# Patient Record
Sex: MALE | Race: WHITE | NOT HISPANIC OR LATINO | Employment: FULL TIME | URBAN - METROPOLITAN AREA
[De-identification: names, ages, dates, MRNs, and addresses within clinical notes are randomized per-mention and may not be internally consistent; named-entity substitution may affect disease eponyms.]

---

## 2017-09-07 DIAGNOSIS — M79.2 NEUROPATHIC PAIN SYNDROME (NON-HERPETIC): ICD-10-CM

## 2017-09-07 RX ORDER — PREGABALIN 150 MG/1
300 CAPSULE ORAL 3 TIMES DAILY
Qty: 540 CAPSULE | Refills: 0 | OUTPATIENT
Start: 2017-09-07

## 2017-09-07 NOTE — TELEPHONE ENCOUNTER
Routing refill request to provider for review/approval because:  Patient needs to be seen because it has been more than 1 year since last office visit.  Miracle Ferro RN CPC Triage.

## 2017-09-07 NOTE — TELEPHONE ENCOUNTER
pregabalin (LYRICA) 150 MG capsule      Last Written Prescription Date:  8-16-16  Last Fill Quantity: 540,   # refills: 3  Last Office Visit with [facility], [facility] or [facility] prescribing provider: 8-16-16  Future Office visit:       Routing refill request to provider for review/approval because:  Drug not on the [facility], [facility] or [facility] refill protocol or controlled substance

## 2017-09-12 DIAGNOSIS — I10 ESSENTIAL HYPERTENSION WITH GOAL BLOOD PRESSURE LESS THAN 130/80: ICD-10-CM

## 2017-09-12 NOTE — TELEPHONE ENCOUNTER
valsartan (DIOVAN) 160 MG tablet      Last Written Prescription Date: 8/16/16  Last Fill Quantity: 90, # refills: 3  Last Office Visit with [facility] or [facility] prescribing provider: 8/16/16       Potassium   Date Value Ref Range Status   08/16/2016 4.3 3.4 - 5.3 mmol/L Final     Creatinine   Date Value Ref Range Status   08/16/2016 0.78 0.66 - 1.25 mg/dL Final     BP Readings from Last 3 Encounters:   08/16/16 125/74   08/03/15 125/77   03/10/15 124/76

## 2017-09-13 RX ORDER — VALSARTAN 160 MG/1
160 TABLET ORAL DAILY
Qty: 30 TABLET | Refills: 0 | Status: SHIPPED | OUTPATIENT
Start: 2017-09-13 | End: 2017-09-25

## 2017-09-13 NOTE — TELEPHONE ENCOUNTER
Routing refill request to provider for review/approval because:  Labs out of range  Patient needs to be seen because it has been more than 1 year since last office visit.  Script still under Dr. Johnson.  Miracle Ferro RN CPC Triage.

## 2017-09-25 ENCOUNTER — OFFICE VISIT (OUTPATIENT)
Dept: FAMILY MEDICINE | Facility: CLINIC | Age: 57
End: 2017-09-25
Payer: COMMERCIAL

## 2017-09-25 VITALS
HEART RATE: 88 BPM | TEMPERATURE: 97.3 F | HEIGHT: 69 IN | DIASTOLIC BLOOD PRESSURE: 75 MMHG | OXYGEN SATURATION: 99 % | BODY MASS INDEX: 28.9 KG/M2 | WEIGHT: 195.13 LBS | SYSTOLIC BLOOD PRESSURE: 127 MMHG

## 2017-09-25 DIAGNOSIS — M79.2 NEUROPATHIC PAIN SYNDROME (NON-HERPETIC): ICD-10-CM

## 2017-09-25 DIAGNOSIS — E78.5 HYPERLIPIDEMIA LDL GOAL <130: ICD-10-CM

## 2017-09-25 DIAGNOSIS — I10 HYPERTENSION GOAL BP (BLOOD PRESSURE) < 140/90: ICD-10-CM

## 2017-09-25 DIAGNOSIS — Z00.01 ENCOUNTER FOR PREVENTATIVE ADULT HEALTH CARE EXAM WITH ABNORMAL FINDINGS: Primary | ICD-10-CM

## 2017-09-25 DIAGNOSIS — G89.4 CHRONIC PAIN DISORDER: ICD-10-CM

## 2017-09-25 DIAGNOSIS — R73.01 IMPAIRED FASTING GLUCOSE: ICD-10-CM

## 2017-09-25 DIAGNOSIS — Z12.5 SCREENING FOR PROSTATE CANCER: ICD-10-CM

## 2017-09-25 LAB
ALBUMIN SERPL-MCNC: 4 G/DL (ref 3.4–5)
ALP SERPL-CCNC: 83 U/L (ref 40–150)
ALT SERPL W P-5'-P-CCNC: 38 U/L (ref 0–70)
ANION GAP SERPL CALCULATED.3IONS-SCNC: 8 MMOL/L (ref 3–14)
AST SERPL W P-5'-P-CCNC: 23 U/L (ref 0–45)
BILIRUB SERPL-MCNC: 0.5 MG/DL (ref 0.2–1.3)
BUN SERPL-MCNC: 12 MG/DL (ref 7–30)
CALCIUM SERPL-MCNC: 8.9 MG/DL (ref 8.5–10.1)
CHLORIDE SERPL-SCNC: 106 MMOL/L (ref 94–109)
CHOLEST SERPL-MCNC: 154 MG/DL
CO2 SERPL-SCNC: 25 MMOL/L (ref 20–32)
CREAT SERPL-MCNC: 0.91 MG/DL (ref 0.66–1.25)
GFR SERPL CREATININE-BSD FRML MDRD: 86 ML/MIN/1.7M2
GLUCOSE SERPL-MCNC: 91 MG/DL (ref 70–99)
HBA1C MFR BLD: 5.7 % (ref 4.3–6)
HDLC SERPL-MCNC: 49 MG/DL
LDLC SERPL CALC-MCNC: 79 MG/DL
NONHDLC SERPL-MCNC: 105 MG/DL
POTASSIUM SERPL-SCNC: 4.2 MMOL/L (ref 3.4–5.3)
PROT SERPL-MCNC: 7.3 G/DL (ref 6.8–8.8)
PSA SERPL-ACNC: 1.45 UG/L (ref 0–4)
SODIUM SERPL-SCNC: 139 MMOL/L (ref 133–144)
TRIGL SERPL-MCNC: 128 MG/DL

## 2017-09-25 PROCEDURE — 99396 PREV VISIT EST AGE 40-64: CPT | Performed by: FAMILY MEDICINE

## 2017-09-25 PROCEDURE — G0103 PSA SCREENING: HCPCS | Performed by: FAMILY MEDICINE

## 2017-09-25 PROCEDURE — 83036 HEMOGLOBIN GLYCOSYLATED A1C: CPT | Performed by: FAMILY MEDICINE

## 2017-09-25 PROCEDURE — 80053 COMPREHEN METABOLIC PANEL: CPT | Performed by: FAMILY MEDICINE

## 2017-09-25 PROCEDURE — 36415 COLL VENOUS BLD VENIPUNCTURE: CPT | Performed by: FAMILY MEDICINE

## 2017-09-25 PROCEDURE — 80061 LIPID PANEL: CPT | Performed by: FAMILY MEDICINE

## 2017-09-25 RX ORDER — VALSARTAN 160 MG/1
160 TABLET ORAL DAILY
Qty: 90 TABLET | Refills: 3 | Status: SHIPPED | OUTPATIENT
Start: 2017-09-25 | End: 2018-06-21

## 2017-09-25 RX ORDER — HYDROCODONE BITARTRATE AND ACETAMINOPHEN 5; 325 MG/1; MG/1
1 TABLET ORAL EVERY 6 HOURS PRN
Qty: 100 TABLET | Refills: 0 | Status: SHIPPED | OUTPATIENT
Start: 2017-09-25 | End: 2018-05-18

## 2017-09-25 RX ORDER — SERTRALINE HYDROCHLORIDE 100 MG/1
100 TABLET, FILM COATED ORAL DAILY
Qty: 90 TABLET | Refills: 3 | Status: SHIPPED | OUTPATIENT
Start: 2017-09-25 | End: 2018-06-21

## 2017-09-25 RX ORDER — PREGABALIN 150 MG/1
300 CAPSULE ORAL 3 TIMES DAILY
Qty: 540 CAPSULE | Refills: 3 | Status: SHIPPED | OUTPATIENT
Start: 2017-09-25 | End: 2018-06-21

## 2017-09-25 RX ORDER — SIMVASTATIN 40 MG
40 TABLET ORAL AT BEDTIME
Qty: 90 TABLET | Refills: 3 | Status: SHIPPED | OUTPATIENT
Start: 2017-09-25 | End: 2018-06-21

## 2017-09-25 ASSESSMENT — ANXIETY QUESTIONNAIRES
IF YOU CHECKED OFF ANY PROBLEMS ON THIS QUESTIONNAIRE, HOW DIFFICULT HAVE THESE PROBLEMS MADE IT FOR YOU TO DO YOUR WORK, TAKE CARE OF THINGS AT HOME, OR GET ALONG WITH OTHER PEOPLE: NOT DIFFICULT AT ALL
2. NOT BEING ABLE TO STOP OR CONTROL WORRYING: NOT AT ALL
6. BECOMING EASILY ANNOYED OR IRRITABLE: NOT AT ALL
1. FEELING NERVOUS, ANXIOUS, OR ON EDGE: SEVERAL DAYS
3. WORRYING TOO MUCH ABOUT DIFFERENT THINGS: NOT AT ALL
GAD7 TOTAL SCORE: 1
7. FEELING AFRAID AS IF SOMETHING AWFUL MIGHT HAPPEN: NOT AT ALL
5. BEING SO RESTLESS THAT IT IS HARD TO SIT STILL: NOT AT ALL

## 2017-09-25 ASSESSMENT — PATIENT HEALTH QUESTIONNAIRE - PHQ9
5. POOR APPETITE OR OVEREATING: NOT AT ALL
SUM OF ALL RESPONSES TO PHQ QUESTIONS 1-9: 2

## 2017-09-25 ASSESSMENT — PAIN SCALES - GENERAL: PAINLEVEL: NO PAIN (0)

## 2017-09-25 NOTE — PATIENT INSTRUCTIONS
Stretching/ strengthening for neck and back    Continue same meds    We will send you lab results

## 2017-09-25 NOTE — PROGRESS NOTES
SUBJECTIVE:   CC: Alexey Reed is an 56 year old male who presents for preventative health visit.     Physical   Annual:     Getting at least 3 servings of Calcium per day::  Yes    Bi-annual eye exam::  Yes    Dental care twice a year::  Yes    Sleep apnea or symptoms of sleep apnea::  None    Diet::  Regular (no restrictions)    Frequency of exercise::  4-5 days/week    Duration of exercise::  15-30 minutes    Taking medications regularly::  Yes    Medication side effects::  Not applicable    Additional concerns today::  No            None    Pronounced cokeon    Comes in once year    Used to see Elizabeth    1998 bad accident  Jaw, neck, etc    lyrica works well    Saw specialists, had a surgery    Dentist helped some    Night appliance for a while    Flares up about monthly    Bad for 1-2 days    Left side of face    Painful     Some numbness    Wakeboard accident    Stretching exercises    Still smokes, interested in qutting    Lots of walking at work, 6- 10K steps per day        Today's PHQ-2 Score:   PHQ-2 ( 1999 Pfizer) 9/25/2017   Q1: Little interest or pleasure in doing things 0   Q2: Feeling down, depressed or hopeless 0   PHQ-2 Score 0   Q1: Little interest or pleasure in doing things Not at all   Q2: Feeling down, depressed or hopeless Not at all   PHQ-2 Score 0       Abuse: Current or Past(Physical, Sexual or Emotional)- No  Do you feel safe in your environment - Yes    Social History   Substance Use Topics     Smoking status: Current Every Day Smoker     Packs/day: 0.50     Years: 21.00     Types: Cigarettes     Smokeless tobacco: Never Used     Alcohol use No     The patient does not drink >3 drinks per day nor >7 drinks per week.    Last PSA:   PSA   Date Value Ref Range Status   08/16/2016 1.49 0 - 4 ug/L Final     Comment:     Assay Method:  Chemiluminescence using Siemens Vista analyzer       Reviewed orders with patient. Reviewed health maintenance and updated orders accordingly - Yes    "    Reviewed and updated as needed this visit by clinical staff  Tobacco  Allergies  Meds  Problems  Med Hx  Surg Hx  Fam Hx  Soc Hx          Reviewed and updated as needed this visit by Provider              ROS:  C: NEGATIVE for fever, chills, change in weight  I: NEGATIVE for worrisome rashes, moles or lesions  E: NEGATIVE for vision changes or irritation  ENT: NEGATIVE for ear, mouth and throat problems  R: NEGATIVE for significant cough or SOB  CV: NEGATIVE for chest pain, palpitations or peripheral edema  GI: NEGATIVE for nausea, abdominal pain, heartburn, or change in bowel habits   male: negative for dysuria, hematuria, decreased urinary stream, erectile dysfunction, urethral discharge  MUSCULOSKELETAL:see above    N: NEGATIVE for weakness, dizziness or paresthesias  E: NEGATIVE for temperature intolerance, skin/hair changes    OBJECTIVE:   /75 (BP Location: Left arm, Patient Position: Chair, Cuff Size: Adult Regular)  Pulse 88  Temp 97.3  F (36.3  C) (Oral)  Ht 5' 9\" (1.753 m)  Wt 195 lb 2 oz (88.5 kg)  SpO2 99%  BMI 28.81 kg/m2    EXAM:  GENERAL: healthy, alert and no distress  HENT: ear canals and TM's normal, nose and mouth without ulcers or lesions  NECK: no adenopathy, no asymmetry, masses, or scars and thyroid normal to palpation  RESP: lungs clear to auscultation - no rales, rhonchi or wheezes  CV: regular rate and rhythm, normal S1 S2, no S3 or S4, no murmur, click or rub, no peripheral edema and peripheral pulses strong  ABDOMEN: soft, nontender, no hepatosplenomegaly, no masses and bowel sounds normal  MS: no gross musculoskeletal defects noted, no edema  SKIN: no suspicious lesions or rashes  NEURO: Normal strength and tone, mentation intact and speech normal  PSYCH: mentation appears normal, affect normal/bright  Note patient declined the genital / rectal exam;  No problems there he states    ASSESSMENT/PLAN:   Alexey was seen today for physical, health maintenance and *_* " health care directive *_*.    Diagnoses and all orders for this visit:    Encounter for preventative adult health care exam with abnormal findings    Neuropathic pain syndrome (non-herpetic)  -     pregabalin (LYRICA) 150 MG capsule; Take 2 capsules (300 mg) by mouth 3 times daily  -     sertraline (ZOLOFT) 100 MG tablet; Take 1 tablet (100 mg) by mouth daily  -     HYDROcodone-acetaminophen (NORCO) 5-325 MG per tablet; Take 1 tablet by mouth every 6 hours as needed for pain    Chronic pain disorder  -     HYDROcodone-acetaminophen (NORCO) 5-325 MG per tablet; Take 1 tablet by mouth every 6 hours as needed for pain    Hypertension goal BP (blood pressure) < 140/90  -     valsartan (DIOVAN) 160 MG tablet; Take 1 tablet (160 mg) by mouth daily    Hyperlipidemia LDL goal <130  -     simvastatin (ZOCOR) 40 MG tablet; Take 1 tablet (40 mg) by mouth At Bedtime Due for office visit  -     Lipid panel reflex to direct LDL  -     Comprehensive metabolic panel    Impaired fasting glucose  -     Hemoglobin A1c    Screening for prostate cancer  -     Prostate spec antigen screen    Other orders  -     Cancel: Basic metabolic panel  -     Cancel: Drug  Screen Comprehensive, Urine w/o Reported Meds (Pain Care Package)    overall patient stable  Has the chronic nerve pain, mostly controlled on lyrica for years; refill this  Also the vicodin prescription lasted him over a year.  Will refill this with the expectation that this one will also last a full year.  He understands and agrees.  Up to date on colonoscopy  Check labs  Keep working on healthy diet/exercise     COUNSELING:   Reviewed preventive health counseling, as reflected in patient instructions       Regular exercise       Healthy diet/nutrition       Vision screening       Hearing screening       Safe sex practices/STD prevention       Colon cancer screening       Prostate cancer screening           reports that he has been smoking Cigarettes.  He has a 10.50 pack-year  "smoking history. He has never used smokeless tobacco.  Tobacco Cessation Action Plan: Information offered: Patient not interested at this time  Estimated body mass index is 28.81 kg/(m^2) as calculated from the following:    Height as of this encounter: 5' 9\" (1.753 m).    Weight as of this encounter: 195 lb 2 oz (88.5 kg).   Weight management plan: Discussed healthy diet and exercise guidelines and patient will follow up in 12 months in clinic to re-evaluate.    Counseling Resources:  ATP IV Guidelines  Pooled Cohorts Equation Calculator  FRAX Risk Assessment  ICSI Preventive Guidelines  Dietary Guidelines for Americans, 2010  USDA's MyPlate  ASA Prophylaxis  Lung CA Screening    David Morse MD  [facility]  Answers for HPI/ROS submitted by the patient on 9/25/2017   PHQ-2 Score: 0    "

## 2017-09-25 NOTE — MR AVS SNAPSHOT
After Visit Summary   9/25/2017    Alexey Reed    MRN: [id number]           Patient Information     Date Of Birth          1960        Visit Information        Provider Department      9/25/2017 11:40 AM David Morse MD [facility]        Today's Diagnoses     Impaired fasting glucose    -  1    Essential hypertension with goal blood pressure less than 130/80        Hyperlipidemia LDL goal <130        Neuropathic pain syndrome (non-herpetic)        Chronic pain disorder        Screening for prostate cancer          Care Instructions    Stretching/ strengthening for neck and back    Continue same meds    We will send you lab results           Follow-ups after your visit        Who to contact     If you have questions or need follow up information about today's clinic visit or your schedule please contact [facility] directly at [phone].  Normal or non-critical lab and imaging results will be communicated to you by MyChart, letter or phone within 4 business days after the clinic has received the results. If you do not hear from us within 7 days, please contact the clinic through [facility] or phone. If you have a critical or abnormal lab result, we will notify you by phone as soon as possible.  Submit refill requests through [email or web address] or call your pharmacy and they will forward the refill request to us. Please allow 3 business days for your refill to be completed.          Additional Information About Your Visit        My[employer] Information     [email or web address] gives you secure access to your electronic health record. If you see a primary care provider, you can also send messages to your care team and make appointments. If you have questions, please call your primary care clinic.  If you do not have a primary care provider, please call [phone] and they will assist you.        Care EveryWhere ID     This is your Care EveryWhere ID. This could be used by  "other organizations to access your [place] medical records  [id number]        Your Vitals Were     Pulse Temperature Height Pulse Oximetry BMI (Body Mass Index)       88 97.3  F (36.3  C) (Oral) 5' 9\" (1.753 m) 99% 28.81 kg/m2        Blood Pressure from Last 3 Encounters:   09/25/17 127/75   08/16/16 125/74   08/03/15 125/77    Weight from Last 3 Encounters:   09/25/17 195 lb 2 oz (88.5 kg)   08/16/16 201 lb (91.2 kg)   08/03/15 200 lb (90.7 kg)              We Performed the Following     Comprehensive metabolic panel     Hemoglobin A1c     Lipid panel reflex to direct LDL     Prostate spec antigen screen          Where to get your medicines      These medications were sent to [facility] [id number]  [place] - [address] [place] AT [facility] [address]  [address] [place] [address]     Phone:  [phone]     sertraline 100 MG tablet    simvastatin 40 MG tablet    valsartan 160 MG tablet         Some of these will need a paper prescription and others can be bought over the counter.  Ask your nurse if you have questions.     Bring a paper prescription for each of these medications     HYDROcodone-acetaminophen 5-325 MG per tablet    pregabalin 150 MG capsule          Primary Care Provider Office Phone # Fax #    Terry Johnson [phone]       [address] [facility] [address]        Equal Access to Services     NEREIDA [facility] AH: Hadii lida ku albao Sosvitlana, waaxda luqadaha, qaybta kaalmada elizabethyada, wax lobito rasheed. So [facility] [place] [phone].    ATENCIÓN: Si habla español, tiene a carlisle disposición servicios gratuitos de asistencia lingüística. Llame al [phone].    We comply with applicable federal civil rights laws and [place] laws. We do not discriminate on the basis of race, color, national origin, age, disability sex, sexual orientation or gender identity.            Thank you!     Thank you for choosing [facility] " [facility]  for your care. Our goal is always to provide you with excellent care. Hearing back from our patients is one way we can continue to improve our services. Please take a few minutes to complete the written survey that you may receive in the mail after your visit with us. Thank you!             Your Updated Medication List - Protect others around you: Learn how to safely use, store and throw away your medicines at www.disposemymeds.org.          This list is accurate as of: 9/25/17 12:11 PM.  Always use your most recent med list.                   Brand Name Dispense Instructions for use Diagnosis    aspirin 325 MG EC tablet      1 TABLET DAILY        HYDROcodone-acetaminophen 5-325 MG per tablet    NORCO    100 tablet    Take 1 tablet by mouth every 6 hours as needed for pain    Neuropathic pain syndrome (non-herpetic), Chronic pain disorder       Multi-vitamin Tabs tablet   Generic drug:  multivitamin, therapeutic with minerals      1 TABLET DAILY        pregabalin 150 MG capsule    LYRICA    540 capsule    Take 2 capsules (300 mg) by mouth 3 times daily    Neuropathic pain syndrome (non-herpetic)       sertraline 100 MG tablet    ZOLOFT    90 tablet    Take 1 tablet (100 mg) by mouth daily    Neuropathic pain syndrome (non-herpetic)       simvastatin 40 MG tablet    ZOCOR    90 tablet    Take 1 tablet (40 mg) by mouth At Bedtime Due for office visit    Hyperlipidemia LDL goal <130       valsartan 160 MG tablet    DIOVAN    90 tablet    Take 1 tablet (160 mg) by mouth daily    Essential hypertension with goal blood pressure less than 130/80

## 2017-09-25 NOTE — NURSING NOTE
"Chief Complaint   Patient presents with     Physical     Health Maintenance     RONALD and PHQ-9     *_* Health Care Directive *_*       Initial /75 (BP Location: Left arm, Patient Position: Chair, Cuff Size: Adult Regular)  Pulse 88  Temp 97.3  F (36.3  C) (Oral)  Ht 5' 9\" (1.753 m)  Wt 195 lb 2 oz (88.5 kg)  SpO2 99%  BMI 28.81 kg/m2 Estimated body mass index is 28.81 kg/(m^2) as calculated from the following:    Height as of this encounter: 5' 9\" (1.753 m).    Weight as of this encounter: 195 lb 2 oz (88.5 kg).  Medication Reconciliation: complete   Leilani Camargo CMA      "

## 2017-09-26 ENCOUNTER — MYC MEDICAL ADVICE (OUTPATIENT)
Dept: FAMILY MEDICINE | Facility: CLINIC | Age: 57
End: 2017-09-26

## 2017-09-26 ASSESSMENT — ANXIETY QUESTIONNAIRES: GAD7 TOTAL SCORE: 1

## 2017-09-26 NOTE — TELEPHONE ENCOUNTER
I tried to sign electronically but not sure if worked.  Also printed and signed copy, to mail to patient.    David Morse MD

## 2017-09-26 NOTE — TELEPHONE ENCOUNTER
Routing to PCP to review and advise.  See my chart message  Sahara Silverio RN  [facility]

## 2017-09-27 ENCOUNTER — TELEPHONE (OUTPATIENT)
Dept: FAMILY MEDICINE | Facility: CLINIC | Age: 57
End: 2017-09-27

## 2017-09-27 NOTE — TELEPHONE ENCOUNTER
PA is needed for the medication, Lyrica 150 mg. Would you like to start PA or Rx alternative medication? If PA, please list all medications this patient has tried along with any contraindications that may have been experienced in the past. Thank you.    Ph[facility]acy[facility]  Phone: [phone]    Insurance Plan: [employer]  Phone: [phone]  Pt ID: [id number]  Group:     PA submitted through cover my meds. Will wait for response.  Forwarded to Dr. Morse for review.  Leilani Camargo CMA

## 2017-10-03 ENCOUNTER — MYC MEDICAL ADVICE (OUTPATIENT)
Dept: FAMILY MEDICINE | Facility: CLINIC | Age: 57
End: 2017-10-03

## 2017-10-03 DIAGNOSIS — M79.2 NEUROPATHIC PAIN SYNDROME (NON-HERPETIC): ICD-10-CM

## 2017-10-03 NOTE — TELEPHONE ENCOUNTER
Please see [employer] message below.  RN sees PA started 9/17/17, waiting for response from insurance.  Informed patient of that.    Routed to MA and Dr. Morse to see if there's been any update not noted in that encounter.    Chery Higgins RN  [facility]

## 2017-10-04 ENCOUNTER — MYC MEDICAL ADVICE (OUTPATIENT)
Dept: FAMILY MEDICINE | Facility: CLINIC | Age: 57
End: 2017-10-04

## 2017-10-04 DIAGNOSIS — M79.2 NERVE PAIN: Primary | ICD-10-CM

## 2017-10-04 RX ORDER — AMITRIPTYLINE HYDROCHLORIDE 10 MG/1
TABLET ORAL
Qty: 60 TABLET | Refills: 0 | Status: SHIPPED | OUTPATIENT
Start: 2017-10-04 | End: 2017-10-31

## 2017-10-04 NOTE — TELEPHONE ENCOUNTER
This is a duplicate message, closing this one.      Chery Higgins RN  [facility]

## 2017-10-04 NOTE — TELEPHONE ENCOUNTER
Please see MyChart message below - am I wrong or did the PA go through?    Routed to provider and Alie.    Chery Higgins RN  [facility]

## 2017-10-04 NOTE — TELEPHONE ENCOUNTER
I will send this to Dr. Morse. The PA did go through but that does not mean he won't have an out of pocket expense too.   Leilani Camargo CMA

## 2017-10-04 NOTE — TELEPHONE ENCOUNTER
[employer] sent back approval but only for 3 capsules daily. I spoke to Dr. Morse and he is okay with this so I informed the patient and the pharmacy.  Leilani Camargo CMA

## 2017-10-04 NOTE — TELEPHONE ENCOUNTER
PA was approved but only for a quantity of 3 daily. I did inform Dr. Morse and he is okay with this so I called patient and pharmacy to update  Leilani Camargo CMA

## 2017-10-04 NOTE — TELEPHONE ENCOUNTER
I did send in prescription for amitriptyline to take at bedtime.  Not as good as lyrica but better than nothing.  If he does start this, then see us in clinic in a few weeks.    Please inform patient    David Morse MD

## 2017-10-31 RX ORDER — AMITRIPTYLINE HYDROCHLORIDE 10 MG/1
TABLET ORAL
Qty: 90 TABLET | Refills: 5 | Status: SHIPPED | OUTPATIENT
Start: 2017-10-31 | End: 2018-04-08

## 2017-11-01 NOTE — TELEPHONE ENCOUNTER
Can take up to 3 at bedtime.  I sent in prescription with refills.    Please inform patient.      David Morse MD

## 2018-04-08 DIAGNOSIS — M79.2 NERVE PAIN: ICD-10-CM

## 2018-04-09 NOTE — TELEPHONE ENCOUNTER
"Requested Prescriptions   Pending Prescriptions Disp Refills     amitriptyline (ELAVIL) 10 MG tablet [Pharmacy Med Name: AMITRIPTYLINE 10MG TABLETS] 90 tablet 0    Last Written Prescription Date:  10/31/17  Last Fill Quantity: 90,  # refills: 5   Last office visit: 9/25/2017 with prescribing provider:     Future Office Visit:     Sig: TAKE 1 TO 3 TABLETS BY MOUTH AT BEDTIME AS NEEDED    Tricyclic Agents ( Annual appt and no PHQ9) Passed    4/8/2018  2:24 PM       Passed - Blood Pressure under 140/90 in past 12 mos    BP Readings from Last 3 Encounters:   09/25/17 127/75   08/16/16 125/74   08/03/15 125/77                Passed - Recent (12 mo) or future (30 days) visit within authorizing provider's specialty    Patient had office visit in the last 12 months or has a visit in the next 30 days with authorizing provider or within the authorizing provider's specialty.  See \"Patient Info\" tab in inbasket, or \"Choose Columns\" in Meds & Orders section of the refill encounter.           Passed - Patient is age 18 or older          "

## 2018-04-10 RX ORDER — AMITRIPTYLINE HYDROCHLORIDE 10 MG/1
TABLET ORAL
Qty: 90 TABLET | Refills: 1 | Status: SHIPPED | OUTPATIENT
Start: 2018-04-10 | End: 2018-06-21

## 2018-04-10 NOTE — TELEPHONE ENCOUNTER
Prescription approved per [facility] Refill Protocol.  Fabiola Herrera RN

## 2018-05-18 ENCOUNTER — MYC MEDICAL ADVICE (OUTPATIENT)
Dept: FAMILY MEDICINE | Facility: CLINIC | Age: 58
End: 2018-05-18

## 2018-05-18 DIAGNOSIS — M79.2 NEUROPATHIC PAIN SYNDROME (NON-HERPETIC): ICD-10-CM

## 2018-05-18 DIAGNOSIS — G89.4 CHRONIC PAIN DISORDER: ICD-10-CM

## 2018-05-18 RX ORDER — HYDROCODONE BITARTRATE AND ACETAMINOPHEN 5; 325 MG/1; MG/1
1 TABLET ORAL EVERY 6 HOURS PRN
Qty: 20 TABLET | Refills: 0 | Status: SHIPPED | OUTPATIENT
Start: 2018-05-18 | End: 2018-06-21

## 2018-05-18 NOTE — TELEPHONE ENCOUNTER
Flagged for TC to take printed Rx to [facility] pharmacy and notify patient of provider advice, needs to be seen for more.    Nazia Dalton RN  [facility]

## 2018-05-18 NOTE — TELEPHONE ENCOUNTER
Okayed 20 pills only.  The previous prescription was to last a year.    When patient runs out of this prescription, needs to be seen in clinic.    Please inform patient.    David Morse MD

## 2018-05-18 NOTE — TELEPHONE ENCOUNTER
Prescription of HYDROcodone-acetaminophen (NORCO) 5-325 MG per tablet was given to  Pharmacy.  Informed patient via [employer].  Apolonia FITZGERALD

## 2018-05-18 NOTE — TELEPHONE ENCOUNTER
Requested Prescriptions   Pending Prescriptions Disp Refills     HYDROcodone-acetaminophen (NORCO) 5-325 MG per tablet 100 tablet 0     Sig: Take 1 tablet by mouth every 6 hours as needed for pain    There is no refill protocol information for this order        Last Written Prescription Date:  9/25/2017  Last Fill Quantity: 100,  # refills: 0   Last office visit: 9/25/2017 with prescribing provider:  Deal - physical   Future Office Visit:    Routing refill request to provider for review/approval because:  Drug not on the [facility] refill protocol       Sahara Silverio RN  [facility]

## 2018-05-25 ENCOUNTER — OFFICE VISIT (OUTPATIENT)
Dept: URGENT CARE | Facility: URGENT CARE | Age: 58
End: 2018-05-25
Payer: COMMERCIAL

## 2018-05-25 VITALS
HEART RATE: 98 BPM | WEIGHT: 197.4 LBS | TEMPERATURE: 98 F | DIASTOLIC BLOOD PRESSURE: 74 MMHG | BODY MASS INDEX: 29.15 KG/M2 | OXYGEN SATURATION: 96 % | SYSTOLIC BLOOD PRESSURE: 128 MMHG

## 2018-05-25 DIAGNOSIS — S03.42XA: Primary | ICD-10-CM

## 2018-05-25 PROCEDURE — 99213 OFFICE O/P EST LOW 20 MIN: CPT | Performed by: PHYSICIAN ASSISTANT

## 2018-05-25 RX ORDER — CYCLOBENZAPRINE HCL 10 MG
10 TABLET ORAL 2 TIMES DAILY PRN
Qty: 30 TABLET | Refills: 0 | Status: SHIPPED | OUTPATIENT
Start: 2018-05-25 | End: 2018-06-21

## 2018-05-25 NOTE — MR AVS SNAPSHOT
After Visit Summary   5/25/2018    Alexey Reed    MRN: [id number]           Patient Information     Date Of Birth          1960        Visit Information        Provider Department      5/25/2018 5:10 PM Richa Mcbride PA-C [facility]        Today's Diagnoses     Sprain of left temporomandibular joint, initial encounter    -  1       Follow-ups after your visit        Who to contact     If you have questions or need follow up information about today's clinic visit or your schedule please contact [facility] directly at [phone].  Normal or non-critical lab and imaging results will be communicated to you by Supremexhart, letter or phone within 4 business days after the clinic has received the results. If you do not hear from us within 7 days, please contact the clinic through Supremexhart or phone. If you have a critical or abnormal lab result, we will notify you by phone as soon as possible.  Submit refill requests through DvineWave or call your pharmacy and they will forward the refill request to us. Please allow 3 business days for your refill to be completed.          Additional Information About Your Visit        MyChart Information     [employer]Wave gives you secure access to your electronic health record. If you see a primary care provider, you can also send messages to your care team and make appointments. If you have questions, please call your primary care clinic.  If you do not have a primary care provider, please call [phone] and they will assist you.        Care EveryWhere ID     This is your Care EveryWhere ID. This could be used by other organizations to access your [place] medical records  [id number]        Your Vitals Were     Pulse Temperature Pulse Oximetry BMI (Body Mass Index)          98 98  F (36.7  C) (Oral) 96% 29.15 kg/m2         Blood Pressure from Last 3 Encounters:   05/25/18 128/74   09/25/17 127/75   08/16/16 125/74    Weight  from Last 3 Encounters:   05/25/18 197 lb 6.4 oz (89.5 kg)   09/25/17 195 lb 2 oz (88.5 kg)   08/16/16 201 lb (91.2 kg)              Today, you had the following     No orders found for display         Today's Medication Changes          These changes are accurate as of 5/25/18  5:30 PM.  If you have any questions, ask your nurse or doctor.               Start taking these medicines.        Dose/Directions    cyclobenzaprine 10 MG tablet   Commonly known as:  FLEXERIL   Used for:  Sprain of left temporomandibular joint, initial encounter   Started by:  Richa Mcbride PA-C        Dose:  10 mg   Take 1 tablet (10 mg) by mouth 2 times daily as needed for muscle spasms (May cause drowsiness)   Quantity:  30 tablet   Refills:  0            Where to get your medicines      Some of these will need a paper prescription and others can be bought over the counter.  Ask your nurse if you have questions.     Bring a paper prescription for each of these medications     cyclobenzaprine 10 MG tablet                Primary Care Provider    None Specified       No primary provider on file.        Equal Access to Services     [facility]: Hadgilson Gabriel, waaxda lugaurang, qaybta kaalmaclaudia kolb, mirella enciso . So [facility] [phone].    ATENCIÓN: Si habla español, tiene a carlisle disposición servicios gratuitos de asistencia lingüística. Llame al [phone].    We comply with applicable federal civil rights laws and [place] laws. We do not discriminate on the basis of race, color, national origin, age, disability, sex, sexual orientation, or gender identity.            Thank you!     Thank you for choosing [facility]  for your care. Our goal is always to provide you with excellent care. Hearing back from our patients is one way we can continue to improve our services. Please take a few minutes to complete the written survey that you may receive in the mail after your  visit with us. Thank you!             Your Updated Medication List - Protect others around you: Learn how to safely use, store and throw away your medicines at www.disposemymeds.org.          This list is accurate as of 5/25/18  5:30 PM.  Always use your most recent med list.                   Brand Name Dispense Instructions for use Diagnosis    amitriptyline 10 MG tablet    ELAVIL    90 tablet    TAKE 1 TO 3 TABLETS BY MOUTH AT BEDTIME AS NEEDED    Nerve pain       aspirin 325 MG EC tablet      1 TABLET DAILY        cyclobenzaprine 10 MG tablet    FLEXERIL    30 tablet    Take 1 tablet (10 mg) by mouth 2 times daily as needed for muscle spasms (May cause drowsiness)    Sprain of left temporomandibular joint, initial encounter       HYDROcodone-acetaminophen 5-325 MG per tablet    NORCO    20 tablet    Take 1 tablet by mouth every 6 hours as needed for pain    Neuropathic pain syndrome (non-herpetic), Chronic pain disorder       Multi-vitamin Tabs tablet   Generic drug:  multivitamin, therapeutic with minerals      1 TABLET DAILY        pregabalin 150 MG capsule    LYRICA    540 capsule    Take 2 capsules (300 mg) by mouth 3 times daily    Neuropathic pain syndrome (non-herpetic)       sertraline 100 MG tablet    ZOLOFT    90 tablet    Take 1 tablet (100 mg) by mouth daily    Neuropathic pain syndrome (non-herpetic)       simvastatin 40 MG tablet    ZOCOR    90 tablet    Take 1 tablet (40 mg) by mouth At Bedtime Due for office visit    Hyperlipidemia LDL goal <130       valsartan 160 MG tablet    DIOVAN    90 tablet    Take 1 tablet (160 mg) by mouth daily    Hypertension goal BP (blood pressure) < 140/90
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
## 2018-06-21 ENCOUNTER — OFFICE VISIT (OUTPATIENT)
Dept: FAMILY MEDICINE | Facility: CLINIC | Age: 58
End: 2018-06-21
Payer: COMMERCIAL

## 2018-06-21 VITALS
TEMPERATURE: 98.3 F | DIASTOLIC BLOOD PRESSURE: 79 MMHG | HEIGHT: 69 IN | HEART RATE: 87 BPM | WEIGHT: 201 LBS | BODY MASS INDEX: 29.77 KG/M2 | SYSTOLIC BLOOD PRESSURE: 130 MMHG

## 2018-06-21 DIAGNOSIS — E78.5 HYPERLIPIDEMIA LDL GOAL <130: ICD-10-CM

## 2018-06-21 DIAGNOSIS — Z82.49 FAMILY HISTORY OF ABDOMINAL AORTIC ANEURYSM: ICD-10-CM

## 2018-06-21 DIAGNOSIS — I10 HYPERTENSION GOAL BP (BLOOD PRESSURE) < 140/90: ICD-10-CM

## 2018-06-21 DIAGNOSIS — G89.4 CHRONIC PAIN DISORDER: ICD-10-CM

## 2018-06-21 DIAGNOSIS — M79.2 NEUROPATHIC PAIN SYNDROME (NON-HERPETIC): Primary | ICD-10-CM

## 2018-06-21 DIAGNOSIS — R73.01 IMPAIRED FASTING GLUCOSE: ICD-10-CM

## 2018-06-21 LAB
ALBUMIN SERPL-MCNC: 3.7 G/DL (ref 3.4–5)
ALP SERPL-CCNC: 95 U/L (ref 40–150)
ALT SERPL W P-5'-P-CCNC: 30 U/L (ref 0–70)
ANION GAP SERPL CALCULATED.3IONS-SCNC: 8 MMOL/L (ref 3–14)
AST SERPL W P-5'-P-CCNC: 22 U/L (ref 0–45)
BASOPHILS # BLD AUTO: 0 10E9/L (ref 0–0.2)
BASOPHILS NFR BLD AUTO: 0.3 %
BILIRUB SERPL-MCNC: 0.6 MG/DL (ref 0.2–1.3)
BUN SERPL-MCNC: 16 MG/DL (ref 7–30)
CALCIUM SERPL-MCNC: 9.4 MG/DL (ref 8.5–10.1)
CHLORIDE SERPL-SCNC: 107 MMOL/L (ref 94–109)
CO2 SERPL-SCNC: 24 MMOL/L (ref 20–32)
CREAT SERPL-MCNC: 0.96 MG/DL (ref 0.66–1.25)
DIFFERENTIAL METHOD BLD: ABNORMAL
EOSINOPHIL # BLD AUTO: 0.1 10E9/L (ref 0–0.7)
EOSINOPHIL NFR BLD AUTO: 0.8 %
ERYTHROCYTE [DISTWIDTH] IN BLOOD BY AUTOMATED COUNT: 12.9 % (ref 10–15)
GFR SERPL CREATININE-BSD FRML MDRD: 80 ML/MIN/1.7M2
GLUCOSE SERPL-MCNC: 110 MG/DL (ref 70–99)
HBA1C MFR BLD: 5.8 % (ref 0–5.6)
HCT VFR BLD AUTO: 44.7 % (ref 40–53)
HGB BLD-MCNC: 15.2 G/DL (ref 13.3–17.7)
LYMPHOCYTES # BLD AUTO: 2.6 10E9/L (ref 0.8–5.3)
LYMPHOCYTES NFR BLD AUTO: 17.9 %
MCH RBC QN AUTO: 32.4 PG (ref 26.5–33)
MCHC RBC AUTO-ENTMCNC: 34 G/DL (ref 31.5–36.5)
MCV RBC AUTO: 95 FL (ref 78–100)
MONOCYTES # BLD AUTO: 1.4 10E9/L (ref 0–1.3)
MONOCYTES NFR BLD AUTO: 9.2 %
NEUTROPHILS # BLD AUTO: 10.6 10E9/L (ref 1.6–8.3)
NEUTROPHILS NFR BLD AUTO: 71.8 %
PLATELET # BLD AUTO: 259 10E9/L (ref 150–450)
POTASSIUM SERPL-SCNC: 4.5 MMOL/L (ref 3.4–5.3)
PROT SERPL-MCNC: 7.2 G/DL (ref 6.8–8.8)
RBC # BLD AUTO: 4.69 10E12/L (ref 4.4–5.9)
SODIUM SERPL-SCNC: 139 MMOL/L (ref 133–144)
WBC # BLD AUTO: 14.7 10E9/L (ref 4–11)

## 2018-06-21 PROCEDURE — 36415 COLL VENOUS BLD VENIPUNCTURE: CPT | Performed by: FAMILY MEDICINE

## 2018-06-21 PROCEDURE — 99214 OFFICE O/P EST MOD 30 MIN: CPT | Performed by: FAMILY MEDICINE

## 2018-06-21 PROCEDURE — 85025 COMPLETE CBC W/AUTO DIFF WBC: CPT | Performed by: FAMILY MEDICINE

## 2018-06-21 PROCEDURE — 80053 COMPREHEN METABOLIC PANEL: CPT | Performed by: FAMILY MEDICINE

## 2018-06-21 PROCEDURE — 99000 SPECIMEN HANDLING OFFICE-LAB: CPT | Performed by: FAMILY MEDICINE

## 2018-06-21 PROCEDURE — 83036 HEMOGLOBIN GLYCOSYLATED A1C: CPT | Performed by: FAMILY MEDICINE

## 2018-06-21 PROCEDURE — 80307 DRUG TEST PRSMV CHEM ANLYZR: CPT | Mod: 90 | Performed by: FAMILY MEDICINE

## 2018-06-21 RX ORDER — HYDROCODONE BITARTRATE AND ACETAMINOPHEN 5; 325 MG/1; MG/1
1 TABLET ORAL EVERY 6 HOURS PRN
Qty: 100 TABLET | Refills: 0 | Status: SHIPPED | OUTPATIENT
Start: 2018-06-21 | End: 2019-05-20

## 2018-06-21 RX ORDER — SIMVASTATIN 40 MG
40 TABLET ORAL AT BEDTIME
Qty: 90 TABLET | Refills: 3 | Status: SHIPPED | OUTPATIENT
Start: 2018-06-21 | End: 2019-05-20

## 2018-06-21 RX ORDER — PREGABALIN 150 MG/1
300 CAPSULE ORAL 3 TIMES DAILY
Qty: 540 CAPSULE | Refills: 3 | Status: SHIPPED | OUTPATIENT
Start: 2018-06-21 | End: 2019-05-20

## 2018-06-21 RX ORDER — VALSARTAN 160 MG/1
160 TABLET ORAL DAILY
Qty: 90 TABLET | Refills: 3 | Status: SHIPPED | OUTPATIENT
Start: 2018-06-21 | End: 2018-07-18 | Stop reason: ALTCHOICE

## 2018-06-21 RX ORDER — SERTRALINE HYDROCHLORIDE 100 MG/1
100 TABLET, FILM COATED ORAL DAILY
Qty: 90 TABLET | Refills: 3 | Status: SHIPPED | OUTPATIENT
Start: 2018-06-21 | End: 2019-05-20

## 2018-06-21 ASSESSMENT — PAIN SCALES - GENERAL: PAINLEVEL: NO PAIN (0)

## 2018-06-21 NOTE — MR AVS SNAPSHOT
After Visit Summary   6/21/2018    Alexey Reed    MRN: [id number]           Patient Information     Date Of Birth          1960        Visit Information        Provider Department      6/21/2018 8:40 AM David Morse MD [facility]        Today's Diagnoses     Impaired fasting glucose    -  1    Neuropathic pain syndrome (non-herpetic)        Hyperlipidemia LDL goal <130        Hypertension goal BP (blood pressure) < 140/90        Chronic pain disorder        Family history of abdominal aortic aneurysm          Care Instructions    We will send you lab results    Continue same medications    Keep working on healthy diet/exercise     Call to schedule ultrasound anytime          Follow-ups after your visit        Future tests that were ordered for you today     Open Future Orders        Priority Expected Expires Ordered    US abdominal aorta limited Routine  6/21/2019 6/21/2018            Who to contact     If you have questions or need follow up information about today's clinic visit or your schedule please contact [facility] directly at [phone].  Normal or non-critical lab and imaging results will be communicated to you by MyChart, letter or phone within 4 business days after the clinic has received the results. If you do not hear from us within 7 days, please contact the clinic through [employer] or phone. If you have a critical or abnormal lab result, we will notify you by phone as soon as possible.  Submit refill requests through ScheduleSoft or call your pharmacy and they will forward the refill request to us. Please allow 3 business days for your refill to be completed.          Additional Information About Your Visit        [employer] Information     ScheduleSoft gives you secure access to your electronic health record. If you see a primary care provider, you can also send messages to your care team and make appointments. If you have questions,  "please call your primary care clinic.  If you do not have a primary care provider, please call [phone] and they will assist you.        Care EveryWhere ID     This is your Care EveryWhere ID. This could be used by other organizations to access your [place] medical records  [id number]        Your Vitals Were     Pulse Temperature Height BMI (Body Mass Index)          87 98.3  F (36.8  C) (Oral) 5' 9\" (1.753 m) 29.68 kg/m2         Blood Pressure from Last 3 Encounters:   06/21/18 130/79   05/25/18 128/74   09/25/17 127/75    Weight from Last 3 Encounters:   06/21/18 201 lb (91.2 kg)   05/25/18 197 lb 6.4 oz (89.5 kg)   09/25/17 195 lb 2 oz (88.5 kg)              We Performed the Following     CBC with platelets differential     Comprehensive metabolic panel     Drug  Screen Comprehensive, Urine w/o Reported Meds (Pain Care Package)     Hemoglobin A1c          Where to get your medicines      These medications were sent to [facility] [address] [place] [address] [place], [place] - [address] [place] AT [facility] [address]  [address] [place] [address]     Phone:  [phone]     sertraline 100 MG tablet    simvastatin 40 MG tablet    valsartan 160 MG tablet         Some of these will need a paper prescription and others can be bought over the counter.  Ask your nurse if you have questions.     Bring a paper prescription for each of these medications     HYDROcodone-acetaminophen 5-325 MG per tablet    pregabalin 150 MG capsule         Information about OPIOIDS     PRESCRIPTION OPIOIDS: WHAT YOU NEED TO KNOW   We gave you an opioid (narcotic) pain medicine. It is important to manage your pain, but opioids are not always the best choice. You should first try all the other options your care team gave you. Take this medicine for as short a time (and as few doses) as possible.     These medicines have risks:    DO NOT drive when on new or higher doses of pain medicine. These medicines can affect your " alertness and reaction times, and you could be arrested for driving under the influence (DUI). If you need to use opioids long-term, talk to your care team about driving.    DO NOT operate heave machinery    DO NOT do any other dangerous activities while taking these medicines.     DO NOT drink any alcohol while taking these medicines.      If the opioid prescribed includes acetaminophen, DO NOT take with any other medicines that contain acetaminophen. Read all labels carefully. Look for the word  acetaminophen  or  Tylenol.  Ask your pharmacist if you have questions or are unsure.    You can get addicted to pain medicines, especially if you have a history of addiction (chemical, alcohol or substance dependence). Talk to your care team about ways to reduce this risk.    Store your pills in a secure place, locked if possible. We will not replace any lost or stolen medicine. If you don t finish your medicine, please throw away (dispose) as directed by your pharmacist. The [place] Pollution Control Agency has more information about safe disposal: https://www.pca.[facility].[place].us/living-green/managing-unwanted-medications.     All opioids tend to cause constipation. Drink plenty of water and eat foods that have a lot of fiber, such as fruits, vegetables, prune juice, apple juice and high-fiber cereal. Take a laxative (Miralax, milk of magnesia, Colace, Senna) if you don t move your bowels at least every other day.          Primary Care Provider    None Specified       No primary provider on file.        Equal Access to Services     [facility] : Caterina Gabriel, wajeroda mahi, qaybta kaalmirella duarte. So [facility] [phone].    ATENCIÓN: Si habla español, tiene a carlisle disposición servicios gratuitos de asistencia lingüística. Llame al [phone].    We comply with applicable federal civil rights laws and [place] laws. We do not discriminate on the basis of race,  color, national origin, age, disability, sex, sexual orientation, or gender identity.            Thank you!     Thank you for choosing [facility]  for your care. Our goal is always to provide you with excellent care. Hearing back from our patients is one way we can continue to improve our services. Please take a few minutes to complete the written survey that you may receive in the mail after your visit with us. Thank you!             Your Updated Medication List - Protect others around you: Learn how to safely use, store and throw away your medicines at www.disposemymeds.org.          This list is accurate as of 6/21/18  8:57 AM.  Always use your most recent med list.                   Brand Name Dispense Instructions for use Diagnosis    aspirin 325 MG EC tablet      1 TABLET DAILY        HYDROcodone-acetaminophen 5-325 MG per tablet    NORCO    100 tablet    Take 1 tablet by mouth every 6 hours as needed for pain    Neuropathic pain syndrome (non-herpetic), Chronic pain disorder       Multi-vitamin Tabs tablet   Generic drug:  multivitamin, therapeutic with minerals      1 TABLET DAILY        pregabalin 150 MG capsule    LYRICA    540 capsule    Take 2 capsules (300 mg) by mouth 3 times daily    Neuropathic pain syndrome (non-herpetic)       sertraline 100 MG tablet    ZOLOFT    90 tablet    Take 1 tablet (100 mg) by mouth daily    Neuropathic pain syndrome (non-herpetic)       simvastatin 40 MG tablet    ZOCOR    90 tablet    Take 1 tablet (40 mg) by mouth At Bedtime Due for office visit    Hyperlipidemia LDL goal <130       valsartan 160 MG tablet    DIOVAN    90 tablet    Take 1 tablet (160 mg) by mouth daily    Hypertension goal BP (blood pressure) < 140/90

## 2018-06-21 NOTE — PATIENT INSTRUCTIONS
We will send you lab results    Continue same medications    Keep working on healthy diet/exercise     Call to schedule ultrasound anytime

## 2018-06-21 NOTE — PROGRESS NOTES
SUBJECTIVE:   Alexey Reed is a 57 year old male who presents to clinic today for the following health issues:       Hypertension Follow-up      Outpatient blood pressures are not being checked.    Low Salt Diet: low salt      Amount of exercise or physical activity: 6-7 days/week for an average of 30-45 minutes    Problems taking medications regularly: No    Medication side effects: none    Diet: regular (no restrictions)        none    Problem list and histories reviewed & adjusted, as indicated.  Additional history: as documented         Reviewed and updated as needed this visit by clinical staff  Allergies  Meds       Reviewed and updated as needed this visit by Provider          changed insurance, can go back on lyrica    Patient happy about this    Still lots of walking at work     and walk with wife at night    No side effects meds      Except a little bloated on lyrica    No chest pain/ breathing problems    Started drumming again        Physical Exam   Constitutional: He is oriented to person, place, and time and well-developed, well-nourished, and in no distress. No distress.   HENT:   Head: Normocephalic and atraumatic.   Eyes: Conjunctivae are normal.   Neck: Carotid bruit is not present.   Cardiovascular: Normal rate, regular rhythm, normal heart sounds and intact distal pulses.  Exam reveals no gallop and no friction rub.    No murmur heard.  Pulmonary/Chest: Effort normal and breath sounds normal. No respiratory distress. He has no wheezes. He has no rales.   Musculoskeletal: He exhibits no edema.   Neurological: He is alert and oriented to person, place, and time.   Skin: He is not diaphoretic.   Psychiatric: Mood and affect normal.       ASSESSMENT / PLAN:  (M79.2) Neuropathic pain syndrome (non-herpetic)  (primary encounter diagnosis)  Comment: patient stable on current regimen.  Refill meds.   The 100 tabs of pain pill should last about a year.    Plan: sertraline (ZOLOFT) 100 MG tablet,  pregabalin         (LYRICA) 150 MG capsule, Drug  Screen         Comprehensive, Urine w/o Reported Meds (Pain         Care Package), HYDROcodone-acetaminophen         (NORCO) 5-325 MG per tablet             (I10) Hypertension goal BP (blood pressure) < 140/90  Comment: at goal   Plan: valsartan (DIOVAN) 160 MG tablet, CBC with         platelets differential        Refill     (E78.5) Hyperlipidemia LDL goal <130  Comment: refill med, recheck labs   Plan: simvastatin (ZOCOR) 40 MG tablet, Comprehensive        metabolic panel             (G89.4) Chronic pain disorder  Comment: check urine screen  Plan: Drug  Screen Comprehensive, Urine w/o Reported         Meds (Pain Care Package),         HYDROcodone-acetaminophen (NORCO) 5-325 MG per         tablet             (R73.01) Impaired fasting glucose  Comment: check   Plan: Hemoglobin A1c             (Z82.49) Family history of abdominal aortic aneurysm  Comment: patient to call and schedule.  His dad had AAA  Plan: US abdominal aorta limited               I reviewed the patient's medications, allergies, medical history, family history, and social history.    David Morse MD

## 2018-06-26 LAB — COMPREHEN DRUG ANALYSIS UR: NORMAL

## 2018-06-27 NOTE — PROGRESS NOTES
"Urine drug screen was fine.    The white blood count was moderately high.  If you are having any illness type symptoms, follow up in clinic as needed.    The hemoglobin a1c is very similar to last year, but 5.8 is now considered by the lab to be in the \"borderline diabetes\" category.  No medications or home glucose testing needed.  Just keep working on healthy diet/exercise.    Other labs are fine.    David Morse MD  "

## 2018-07-16 DIAGNOSIS — I10 HYPERTENSION GOAL BP (BLOOD PRESSURE) < 140/90: ICD-10-CM

## 2018-07-17 NOTE — TELEPHONE ENCOUNTER
"Requested Prescriptions   Pending Prescriptions Disp Refills     valsartan (DIOVAN) 160 MG tablet [Pharmacy Med Name: VALSARTAN 160MG TABLETS] 90 tablet 0    Last Written Prescription Date:  6/21/18  Last Fill Quantity: 90,  # refills: 3   Last office visit: 6/21/2018 with prescribing provider:     Future Office Visit:     Sig: TAKE 1 TABLET(160 MG) BY MOUTH DAILY    Angiotensin-II Receptors Passed    7/16/2018  9:00 PM       Passed - Blood pressure under 140/90 in past 12 months    BP Readings from Last 3 Encounters:   06/21/18 130/79   05/25/18 128/74   09/25/17 127/75                Passed - Recent (12 mo) or future (30 days) visit within the authorizing provider's specialty    Patient had office visit in the last 12 months or has a visit in the next 30 days with authorizing provider or within the authorizing provider's specialty.  See \"Patient Info\" tab in inbasket, or \"Choose Columns\" in Meds & Orders section of the refill encounter.           Passed - Patient is age 18 or older       Passed - Normal serum creatinine on file in past 12 months    Recent Labs   Lab Test  06/21/18   0905   CR  0.96            Passed - Normal serum potassium on file in past 12 months    Recent Labs   Lab Test  06/21/18   0905   POTASSIUM  4.5                      "

## 2018-07-18 DIAGNOSIS — D72.829 LEUKOCYTOSIS, UNSPECIFIED TYPE: ICD-10-CM

## 2018-07-18 LAB
BASOPHILS # BLD AUTO: 0.1 10E9/L (ref 0–0.2)
BASOPHILS NFR BLD AUTO: 0.5 %
DIFFERENTIAL METHOD BLD: NORMAL
EOSINOPHIL # BLD AUTO: 0.2 10E9/L (ref 0–0.7)
EOSINOPHIL NFR BLD AUTO: 1.4 %
ERYTHROCYTE [DISTWIDTH] IN BLOOD BY AUTOMATED COUNT: 13.6 % (ref 10–15)
HCT VFR BLD AUTO: 44.9 % (ref 40–53)
HGB BLD-MCNC: 15 G/DL (ref 13.3–17.7)
LYMPHOCYTES # BLD AUTO: 2.4 10E9/L (ref 0.8–5.3)
LYMPHOCYTES NFR BLD AUTO: 22 %
MCH RBC QN AUTO: 32.5 PG (ref 26.5–33)
MCHC RBC AUTO-ENTMCNC: 33.4 G/DL (ref 31.5–36.5)
MCV RBC AUTO: 97 FL (ref 78–100)
MONOCYTES # BLD AUTO: 1.1 10E9/L (ref 0–1.3)
MONOCYTES NFR BLD AUTO: 10.3 %
NEUTROPHILS # BLD AUTO: 7.3 10E9/L (ref 1.6–8.3)
NEUTROPHILS NFR BLD AUTO: 65.8 %
PLATELET # BLD AUTO: 226 10E9/L (ref 150–450)
RBC # BLD AUTO: 4.61 10E12/L (ref 4.4–5.9)
WBC # BLD AUTO: 11 10E9/L (ref 4–11)

## 2018-07-18 PROCEDURE — 36415 COLL VENOUS BLD VENIPUNCTURE: CPT | Performed by: PHYSICIAN ASSISTANT

## 2018-07-18 PROCEDURE — 85025 COMPLETE CBC W/AUTO DIFF WBC: CPT | Performed by: PHYSICIAN ASSISTANT

## 2018-07-18 RX ORDER — VALSARTAN 160 MG/1
TABLET ORAL
Qty: 90 TABLET | Refills: 0 | OUTPATIENT
Start: 2018-07-18

## 2018-07-18 NOTE — TELEPHONE ENCOUNTER
Valsartan changed to Losartan due to recall. See [email or web address] message dated 7/16/2018.  Saima Turcios RN

## 2018-08-05 ENCOUNTER — OFFICE VISIT (OUTPATIENT)
Dept: URGENT CARE | Facility: URGENT CARE | Age: 58
End: 2018-08-05
Payer: COMMERCIAL

## 2018-08-05 VITALS
DIASTOLIC BLOOD PRESSURE: 79 MMHG | RESPIRATION RATE: 18 BRPM | TEMPERATURE: 98 F | WEIGHT: 201.6 LBS | HEART RATE: 81 BPM | OXYGEN SATURATION: 97 % | BODY MASS INDEX: 29.77 KG/M2 | SYSTOLIC BLOOD PRESSURE: 124 MMHG

## 2018-08-05 DIAGNOSIS — S61.211A LACERATION OF LEFT INDEX FINGER WITHOUT FOREIGN BODY WITHOUT DAMAGE TO NAIL, INITIAL ENCOUNTER: Primary | ICD-10-CM

## 2018-08-05 PROCEDURE — 12001 RPR S/N/AX/GEN/TRNK 2.5CM/<: CPT | Performed by: FAMILY MEDICINE

## 2018-08-05 NOTE — PROGRESS NOTES
Subjective:   Alexey Reed is a 57 year old male who presents for   Chief Complaint   Patient presents with     Laceration     on the left index finger today   Cut across his left index finger on dorsal side about 35 minutes ago with a razor blade. Applied pressure and came here immediately. Up to date on tetanus. Takes full dose of aspirin a day. No other blood thinners.     Accompanied by his wife    PMHX/PSHX/MEDS/ALLERGIES/SHX/FHX reviewed in Epic.    Patient Active Problem List    Diagnosis Date Noted     Hypertension goal BP (blood pressure) < 140/90 09/25/2017     Priority: Medium     FH: prostate cancer 08/16/2016     Priority: Medium     Chronic pain disorder 10/07/2014     Priority: Medium     Neuropathic post trauma.procedure left neck  Patient is followed by PRAVEENA TUCKER for ongoing prescription of narcotic pain medicine.  Med: Norco 5/325.   Maximum use per month: 120  Expected duration: lifelong  Narcotic agreement on file: YES  Clinic visit recommended: q 12 months           Anxiety 09/01/2014     Priority: Medium     Hyperlipidemia with target LDL less than 130 09/01/2014     Priority: Medium     Diagnosis updated by automated process. Provider to review and confirm.       AC joint arthropathy 10/14/2013     Priority: Medium     Tenosynovitis of thumb 05/31/2012     Priority: Medium     HYPERLIPIDEMIA LDL GOAL <130 10/31/2010     Priority: Medium     Neuropathic pain syndrome (non-herpetic)      Priority: Medium     Residual after post traumatic left neck surgery at the [facility]   Controlled with Lyrica and Vicodin 3-4/day  Patient is followed by PRAVEENA TUCKER for ongoing prescription of narcotic pain medicine.  Med: Norco 5/325.   Maximum use per month: 120  Expected duration: lifelogn  Narcotic agreement on file: YES  Clinic visit recommended: q 12 months         Current Outpatient Prescriptions   Medication     ASPIRIN 325 MG PO TBEC     losartan (COZAAR) 100 MG tablet     MULTI-VITAMIN  OR TABS     pregabalin (LYRICA) 150 MG capsule     sertraline (ZOLOFT) 100 MG tablet     simvastatin (ZOCOR) 40 MG tablet     HYDROcodone-acetaminophen (NORCO) 5-325 MG per tablet     No current facility-administered medications for this visit.      ROS:  As above per HPI    Objective:   /79  Pulse 81  Temp 98  F (36.7  C) (Oral)  Resp 18  Wt 201 lb 9.6 oz (91.4 kg)  SpO2 97%  BMI 29.77 kg/m2, Body mass index is 29.77 kg/(m^2).  Gen:  NAD, well-nourished, sitting in chair comfortably  HEENT: EOMI, PERRL sclera anicteric, Head normocephalic, ; nares patent; oropharynx pink CV: cap refill < 2 seconds  Finger: laceration < 1inch on dorsal side of index finger between IP and DIP joint on a non-tense surface region. Not currently bleeding. Superficial to moderate depth cut. Intact movement in flexion and extension. No foreign bodies.       Assessment & Plan:   Alexey Reed, 57 year old male who presents with:  ( Horizontal )Laceration of left index finger without foreign body without damage to nail, initial encounter  - REPAIR SUPERFICIAL, WOUND BODY < =2.5CM    Wound cleansed with saline and alcohol swab prior to applying skin glue. Steri strip was applied to reinforce the area in both planes. Was able to maintain finger flexion without tension created along the laceration line. Patient tolerated procedure well.     Juan Daniel Diamond MD   [facility]    Options for treatment and/or follow-up care were reviewed with the patient. Alexey Reed and/or legal guardian was engaged and actively involved in the decision making process. Patient/guardian verbalized understanding of the options discussed and was satisfied with the final plan.

## 2018-08-05 NOTE — PATIENT INSTRUCTIONS
Avoid rubbing soak the finger with the glue on it for a couple of days      Observe for signs of infection: redness, pain, swelling, drainage - return to be seen if you develop this    Cover with bandage starting on day 3

## 2018-08-05 NOTE — MR AVS SNAPSHOT
After Visit Summary   8/5/2018    Alexey Reed    MRN: [id number]           Patient Information     Date Of Birth          1960        Visit Information        Provider Department      8/5/2018 12:25 PM Juan Daniel Diamond MD [facility]        Today's Diagnoses     Laceration of left index finger without foreign body without damage to nail, initial encounter    -  1      Care Instructions    Avoid rubbing soak the finger with the glue on it for a couple of days      Observe for signs of infection: redness, pain, swelling, drainage - return to be seen if you develop this    Cover with bandage starting on day 3          Follow-ups after your visit        Who to contact     If you have questions or need follow up information about today's clinic visit or your schedule please contact [facility] directly at [phone].  Normal or non-critical lab and imaging results will be communicated to you by [employer], letter or phone within 4 business days after the clinic has received the results. If you do not hear from us within 7 days, please contact the clinic through [employer] or phone. If you have a critical or abnormal lab result, we will notify you by phone as soon as possible.  Submit refill requests through [employer] [employer] or call your pharmacy and they will forward the refill request to us. Please allow 3 business days for your refill to be completed.          Additional Information About Your Visit        MyChart Information     [facility] [employer] gives you secure access to your electronic health record. If you see a primary care provider, you can also send messages to your care team and make appointments. If you have questions, please call your primary care clinic.  If you do not have a primary care provider, please call [phone] and they will assist you.        Care EveryWhere ID     This is your Care EveryWhere ID. This could be used by other organizations to access  your [place] medical records  [id number]        Your Vitals Were     Pulse Temperature Respirations Pulse Oximetry BMI (Body Mass Index)       81 98  F (36.7  C) (Oral) 18 97% 29.77 kg/m2        Blood Pressure from Last 3 Encounters:   08/05/18 124/79   06/21/18 130/79   05/25/18 128/74    Weight from Last 3 Encounters:   08/05/18 201 lb 9.6 oz (91.4 kg)   06/21/18 201 lb (91.2 kg)   05/25/18 197 lb 6.4 oz (89.5 kg)              We Performed the Following     REPAIR SUPERFICIAL, WOUND BODY < =2.5CM        Primary Care Provider    None Specified       No primary provider on file.        Equal Access to Services     TRISH REEDER : Caterina Gabriel, jaylen champagne, kem kolb, mirella enciso . So [facility] [phone].    ATENCIÓN: Si habla español, tiene a carlisle disposición servicios gratuitos de asistencia lingüística. Llame al [phone].    We comply with applicable federal civil rights laws and [place] laws. We do not discriminate on the basis of race, color, national origin, age, disability, sex, sexual orientation, or gender identity.            Thank you!     Thank you for choosing [facility]  for your care. Our goal is always to provide you with excellent care. Hearing back from our patients is one way we can continue to improve our services. Please take a few minutes to complete the written survey that you may receive in the mail after your visit with us. Thank you!             Your Updated Medication List - Protect others around you: Learn how to safely use, store and throw away your medicines at www.disposemymeds.org.          This list is accurate as of 8/5/18 12:57 PM.  Always use your most recent med list.                   Brand Name Dispense Instructions for use Diagnosis    aspirin 325 MG EC tablet      1 TABLET DAILY        HYDROcodone-acetaminophen 5-325 MG per tablet    NORCO    100 tablet    Take 1 tablet by mouth every 6  hours as needed for pain    Neuropathic pain syndrome (non-herpetic), Chronic pain disorder       losartan 100 MG tablet    COZAAR    90 tablet    Take 1 tablet (100 mg) by mouth daily    Hypertension goal BP (blood pressure) < 140/90       Multi-vitamin Tabs tablet   Generic drug:  multivitamin, therapeutic with minerals      1 TABLET DAILY        pregabalin 150 MG capsule    LYRICA    540 capsule    Take 2 capsules (300 mg) by mouth 3 times daily    Neuropathic pain syndrome (non-herpetic)       sertraline 100 MG tablet    ZOLOFT    90 tablet    Take 1 tablet (100 mg) by mouth daily    Neuropathic pain syndrome (non-herpetic)       simvastatin 40 MG tablet    ZOCOR    90 tablet    Take 1 tablet (40 mg) by mouth At Bedtime Due for office visit    Hyperlipidemia LDL goal <130

## 2019-04-01 ENCOUNTER — OFFICE VISIT (OUTPATIENT)
Dept: URGENT CARE | Facility: URGENT CARE | Age: 59
End: 2019-04-01
Payer: COMMERCIAL

## 2019-04-01 ENCOUNTER — VIRTUAL VISIT (OUTPATIENT)
Dept: FAMILY MEDICINE | Facility: OTHER | Age: 59
End: 2019-04-01

## 2019-04-01 ENCOUNTER — ANCILLARY PROCEDURE (OUTPATIENT)
Dept: GENERAL RADIOLOGY | Facility: CLINIC | Age: 59
End: 2019-04-01
Attending: PHYSICIAN ASSISTANT
Payer: COMMERCIAL

## 2019-04-01 VITALS
RESPIRATION RATE: 18 BRPM | HEART RATE: 99 BPM | OXYGEN SATURATION: 94 % | DIASTOLIC BLOOD PRESSURE: 79 MMHG | BODY MASS INDEX: 29.67 KG/M2 | WEIGHT: 200.9 LBS | TEMPERATURE: 98.9 F | SYSTOLIC BLOOD PRESSURE: 143 MMHG

## 2019-04-01 DIAGNOSIS — R06.2 WHEEZING: ICD-10-CM

## 2019-04-01 DIAGNOSIS — J40 BRONCHITIS: Primary | ICD-10-CM

## 2019-04-01 DIAGNOSIS — R06.02 SOB (SHORTNESS OF BREATH): ICD-10-CM

## 2019-04-01 PROCEDURE — 71046 X-RAY EXAM CHEST 2 VIEWS: CPT

## 2019-04-01 PROCEDURE — 94640 AIRWAY INHALATION TREATMENT: CPT | Performed by: PHYSICIAN ASSISTANT

## 2019-04-01 PROCEDURE — 99214 OFFICE O/P EST MOD 30 MIN: CPT | Mod: 25 | Performed by: PHYSICIAN ASSISTANT

## 2019-04-01 RX ORDER — ALBUTEROL SULFATE 90 UG/1
2 AEROSOL, METERED RESPIRATORY (INHALATION) EVERY 6 HOURS PRN
Qty: 8.5 G | Refills: 0 | Status: SHIPPED | OUTPATIENT
Start: 2019-04-01 | End: 2019-06-03

## 2019-04-01 RX ORDER — IPRATROPIUM BROMIDE AND ALBUTEROL SULFATE 2.5; .5 MG/3ML; MG/3ML
3 SOLUTION RESPIRATORY (INHALATION) ONCE
Status: COMPLETED | OUTPATIENT
Start: 2019-04-01 | End: 2019-04-01

## 2019-04-01 RX ORDER — PREDNISONE 20 MG/1
40 TABLET ORAL DAILY
Qty: 10 TABLET | Refills: 0 | Status: SHIPPED | OUTPATIENT
Start: 2019-04-01 | End: 2019-05-20

## 2019-04-01 RX ADMIN — IPRATROPIUM BROMIDE AND ALBUTEROL SULFATE 3 ML: 2.5; .5 SOLUTION RESPIRATORY (INHALATION) at 20:38

## 2019-04-01 ASSESSMENT — ENCOUNTER SYMPTOMS
FREQUENCY: 0
EYE DISCHARGE: 0
HEMATURIA: 0
MUSCULOSKELETAL NEGATIVE: 1
CHEST TIGHTNESS: 0
CONSTITUTIONAL NEGATIVE: 1
GASTROINTESTINAL NEGATIVE: 1
LIGHT-HEADEDNESS: 0
RHINORRHEA: 0
POLYDIPSIA: 0
WEAKNESS: 0
NEUROLOGICAL NEGATIVE: 1
MYALGIAS: 0
ADENOPATHY: 0
WHEEZING: 1
EYES NEGATIVE: 1
DIAPHORESIS: 0
CARDIOVASCULAR NEGATIVE: 1
EYE ITCHING: 0
PALPITATIONS: 0
VOMITING: 0
FEVER: 0
SORE THROAT: 0
DIARRHEA: 0
SHORTNESS OF BREATH: 1
HEADACHES: 0
CHILLS: 0
NAUSEA: 0
EYE REDNESS: 0
ABDOMINAL PAIN: 0
COUGH: 1
DIZZINESS: 0
ENDOCRINE NEGATIVE: 1
DYSURIA: 0

## 2019-04-01 NOTE — PROGRESS NOTES
"Date:   Clinician: Juan Daniel Diamond  Clinician NPI: [id number]  Patient: Alexey Reed  Patient : 1960  Patient Address: [address] [place] [id number]  Patient Phone: [phone]  Visit Protocol: URI  Patient Summary:  Alexey is a 58 year old ( : 1960 ) male who initiated a Visit for cold, sinus infection, or influenza. When asked the question \"Please sign me up to receive news, health information and promotions from [employer].\", Alexey responded \"No\".    Alexey states his symptoms started gradually 3-6 days ago.   His symptoms consist of malaise, a cough, chills, wheezing, myalgia, and a headache. He is experiencing mild difficulty breathing with activities but can speak normally in full sentences. Alexey also feels feverish.   Symptom details     Cough: Alexey coughs almost every minute and his cough is more bothersome at night. Phlegm comes into his throat when he coughs. He does not believe the phlegm causes the cough. The color of the phlegm is green and white.     Temperature: His current temperature is 99.4 degrees Fahrenheit.     Wheezing: Alexey has not ever been diagnosed with asthma. The wheezing does not interfere with his normal daily activities.    Headache: He states the headache is moderate (4-6 on a 10 point pain scale).      Alexey denies having facial pain or pressure, rhinitis, teeth pain, nasal congestion, enlarged lymph nodes, sore throat, and ear pain. He also denies double sickening (worsening symptoms after initial improvement), having a sinus infection within the past year, taking antibiotic medication for the symptoms, and having recent facial or sinus surgery in the past 60 days.   He has not recently been exposed to someone with influenza. Alexey has not been in close contact with any high risk individuals.   Weight: 196 lbs   Alexey smokes or uses smokeless tobacco.   MEDICATIONS: Lyrica oral, simvastatin oral, losartan oral, sertraline oral, ALLERGIES: " NKDA  Clinician Response:  Dear Alexey,  Based on the information provided, you have a viral upper respiratory infection, otherwise known as a cold. Symptoms vary from person to person, but can include sneezing, coughing, a runny nose, sore throat, and headache and range from mild to severe.  Unfortunately, there are no medications that can cure a cold, so treatment is focused on controlling symptoms as much as possible. Most people gradually feel better until symptoms are gone in 1-2 weeks.  Medication information  Because you have a viral infection, antibiotics will not help you get better. Treating a viral infection with antibiotics could actually make you feel worse.  Unless you are allergic to the over-the-counter medication(s) below, I recommend using:       Acetaminophen (Tylenol or store brand) oral tablet. Take 1-2 tablets by mouth every 4-6 hours to help with the discomfort.      Dextromethorphan (Robitussin DM or store brand). This medication is an expectorant that works by thinning the mucus in your air passages to make it easier to cough up the mucus and clear your airways. Please follow the instructions on the package.     Over-the-counter medications do not require a prescription. Ask the pharmacist if you have any questions.  Self care  The following tips will keep you as comfortable as possible while you recover:     Rest    Drink plenty of water and other liquids    Take a hot shower to loosen congestion    Take a spoonful of honey to reduce your cough     Also, as your provider, I need you to know that becoming tobacco-free is the most important thing you can do to protect your current and future health.  When to seek care  Please be seen in a clinic or urgent care if new symptoms develop, or symptoms become worse.   Diagnosis: Viral URI  Diagnosis ICD: J06.9  Additional Clinician Notes: If symptoms get worse with the fever, shortness of breath come in immediately to be seen

## 2019-04-02 NOTE — NURSING NOTE
The following medication was given:     MEDICATION: DUONEB  ROUTE: ORAL  SITE: MOUTH  DOSE: 3 ML  LOT #: [id number]  :  [employer]  EXPIRATION DATE:  09/2020  NDC#: 0216-1821-97     Salvatore Escalante

## 2019-04-02 NOTE — PROGRESS NOTES
Chief Complaint:     Chief Complaint   Patient presents with     URI     OTC cough medicine not working, cough, trouble breathing x3 days      Fever     started late friday night- stopped this morning. High of 99.7       HPI: Alexey Reed is an 58 year old male who presents with dry cough, shortness of breath with activity, nasal congestion, shortness of breath and wheezing. It began  3 day(s) ago and has unchanged.  Cough is nonproductive, occasional There is no chest pain.      Recent travel?  no.      ROS:     Review of Systems   Constitutional: Negative.  Negative for chills, diaphoresis and fever.   HENT: Positive for congestion. Negative for ear pain, rhinorrhea and sore throat.    Eyes: Negative.  Negative for discharge, redness and itching.   Respiratory: Positive for cough, shortness of breath and wheezing. Negative for chest tightness.    Cardiovascular: Negative.  Negative for chest pain and palpitations.   Gastrointestinal: Negative.  Negative for abdominal pain, diarrhea, nausea and vomiting.   Endocrine: Negative.  Negative for polydipsia and polyuria.   Genitourinary: Negative for dysuria, frequency, hematuria and urgency.   Musculoskeletal: Negative.  Negative for myalgias.   Skin: Negative for rash.   Allergic/Immunologic: Negative for immunocompromised state.   Neurological: Negative.  Negative for dizziness, weakness, light-headedness and headaches.   Hematological: Negative for adenopathy.        Respiratory History  occasional episodes of bronchitis       Family History   Family History   Problem Relation Age of Onset     Hypertension Mother      Arthritis Mother      Cancer Mother         BLADDER/ kidney     Gastrointestinal Disease Mother      Osteoporosis Mother      Hypertension Father      Prostate Cancer Father      Alcohol/Drug Father      Genitourinary Problems Father      Hypertension Maternal Grandmother      Cancer - colorectal Maternal Grandmother      Osteoporosis Maternal  Grandfather      Alcohol/Drug Paternal Grandfather      Hypertension Brother      Hypertension Sister      Allergies Daughter      Hypertension Sister      Gynecology Sister      Neurologic Disorder Son 26        MS        Problem history  Patient Active Problem List   Diagnosis     Neuropathic pain syndrome (non-herpetic)     HYPERLIPIDEMIA LDL GOAL <130     Tenosynovitis of thumb     AC joint arthropathy     Anxiety     Hyperlipidemia with target LDL less than 130     Chronic pain disorder     FH: prostate cancer     Hypertension goal BP (blood pressure) < 140/90        Allergies  Allergies   Allergen Reactions     Ace Inhibitors Cough        Social History  Social History     Socioeconomic History     Marital status:      Spouse name: Not on file     Number of children: 4     Years of education: Not on file     Highest education level: Not on file   Occupational History     Occupation:      Comment: [employer]   Social Needs     Financial resource strain: Not on file     Food insecurity:     Worry: Not on file     Inability: Not on file     Transportation needs:     Medical: Not on file     Non-medical: Not on file   Tobacco Use     Smoking status: Current Every Day Smoker     Packs/day: 0.50     Years: 21.00     Pack years: 10.50     Types: Cigarettes     Smokeless tobacco: Never Used   Substance and Sexual Activity     Alcohol use: No     Drug use: No     Sexual activity: Yes     Partners: Female   Lifestyle     Physical activity:     Days per week: Not on file     Minutes per session: Not on file     Stress: Not on file   Relationships     Social connections:     Talks on phone: Not on file     Gets together: Not on file     Attends Denominational service: Not on file     Active member of club or organization: Not on file     Attends meetings of clubs or organizations: Not on file     Relationship status: Not on file     Intimate partner violence:     Fear of current or ex partner:  Not on file     Emotionally abused: Not on file     Physically abused: Not on file     Forced sexual activity: Not on file   Other Topics Concern     Parent/sibling w/ CABG, MI or angioplasty before 65F 55M? No   Social History Narrative     Not on file        Current Meds    Current Outpatient Medications:      albuterol (PROAIR HFA/PROVENTIL HFA/VENTOLIN HFA) 108 (90 Base) MCG/ACT inhaler, Inhale 2 puffs into the lungs every 6 hours as needed for shortness of breath / dyspnea or wheezing, Disp: 8.5 g, Rfl: 0     ASPIRIN 325 MG PO TBEC, 1 TABLET DAILY, Disp: , Rfl:      losartan (COZAAR) 100 MG tablet, Take 1 tablet (100 mg) by mouth daily, Disp: 90 tablet, Rfl: 3     MULTI-VITAMIN OR TABS, 1 TABLET DAILY, Disp: , Rfl:      predniSONE (DELTASONE) 20 MG tablet, Take 40 mg by mouth daily for 5 days., Disp: 10 tablet, Rfl: 0     pregabalin (LYRICA) 150 MG capsule, Take 2 capsules (300 mg) by mouth 3 times daily, Disp: 540 capsule, Rfl: 3     sertraline (ZOLOFT) 100 MG tablet, Take 1 tablet (100 mg) by mouth daily, Disp: 90 tablet, Rfl: 3     simvastatin (ZOCOR) 40 MG tablet, Take 1 tablet (40 mg) by mouth At Bedtime Due for office visit, Disp: 90 tablet, Rfl: 3     HYDROcodone-acetaminophen (NORCO) 5-325 MG per tablet, Take 1 tablet by mouth every 6 hours as needed for pain (Patient not taking: Reported on 8/5/2018), Disp: 100 tablet, Rfl: 0  No current facility-administered medications for this visit.         OBJECTIVE     Vital signs reviewed by Terry Randolph  /79   Pulse 99   Temp 98.9  F (37.2  C) (Oral)   Resp 18   Wt 91.1 kg (200 lb 14.4 oz)   SpO2 94%   BMI 29.67 kg/m       Physical Exam   Constitutional: He is oriented to person, place, and time. He appears well-developed and well-nourished. He is cooperative.  Non-toxic appearance. He does not have a sickly appearance. He does not appear ill. No distress.   HENT:   Head: Normocephalic and atraumatic.   Right Ear: Hearing, tympanic membrane,  external ear and ear canal normal. Tympanic membrane is not perforated, not erythematous, not retracted and not bulging.   Left Ear: Hearing, tympanic membrane, external ear and ear canal normal. Tympanic membrane is not perforated, not erythematous, not retracted and not bulging.   Nose: Mucosal edema and rhinorrhea present. Right sinus exhibits no maxillary sinus tenderness and no frontal sinus tenderness. Left sinus exhibits no maxillary sinus tenderness and no frontal sinus tenderness.   Mouth/Throat: Mucous membranes are normal. Posterior oropharyngeal erythema present. No oropharyngeal exudate or posterior oropharyngeal edema. Tonsils are 0 on the right. Tonsils are 0 on the left. No tonsillar exudate.   Eyes: Conjunctivae, EOM and lids are normal. Pupils are equal, round, and reactive to light. Right eye exhibits no discharge and no exudate. Left eye exhibits no discharge and no exudate. Right conjunctiva is not injected. Left conjunctiva is not injected.   Neck: Normal range of motion. Neck supple.   Cardiovascular: Normal rate, regular rhythm, normal heart sounds and intact distal pulses. Exam reveals no gallop and no friction rub.   No murmur heard.  Pulmonary/Chest: Effort normal. No stridor. No respiratory distress. He has no decreased breath sounds. He has wheezes. He has no rhonchi. He has no rales. He exhibits no tenderness.   Abdominal: Soft. Bowel sounds are normal. He exhibits no distension and no mass. There is no tenderness. There is no rigidity, no rebound, no guarding and no CVA tenderness.   Musculoskeletal: Normal range of motion.   Neurological: He is alert and oriented to person, place, and time. He displays normal reflexes. No cranial nerve deficit or sensory deficit. He exhibits normal muscle tone. Coordination normal.   Skin: Skin is warm. Capillary refill takes less than 2 seconds. He is not diaphoretic.   Psychiatric: He has a normal mood and affect. His behavior is normal. Judgment  and thought content normal.         Labs:     Results for orders placed or performed in visit on 04/01/19   XR Chest 2 Views    Narrative    CHEST TWO VIEWS      4/1/2019 8:19 PM     HISTORY: Three days of worsening cough and shortness of breath, rule  out pneumonia. SOB (shortness of breath).    COMPARISON: 4/1/2019.      Impression    IMPRESSION: No acute abnormality. Lungs are well-inflated and clear.  Heart size is normal. There is slight wedging of the T11 and T12  vertebral bodies, likely chronic.     NANO HERMAN MD       Medical Decision Making:    Differential Diagnosis:  URI Adult/Peds:  Bronchitis-viral and Viral upper respiratory illness        ASSESSMENT    1. Bronchitis    2. SOB (shortness of breath)    3. Wheezing        PLAN    Patient presents with 3 days of dry cough, shortness of breath with activity, wheezing and nasal congestion.  Patient is in no acute distress.  Temp is 98.9 in clinic today.  Lung sounds were clear and O2 sats at 94% on RA.  Imaging to rule out pneumonia ordered.  CXR was negative for any acute infiltrates or consolidations per my read.  With wheezing, patient given duoneb treatment in clinic today.  Patient verbalized significant relief of symptoms.  Rx for Prednisone, and albuterol inhaler.  Rest, Push fluids, vaporizer, elevation of head of bed.  Ibuprofen and or Tylenol for any fever or body aches.  Over the counter cough suppressant- PRN- as discussed.   If symptoms worsen, recheck immediately otherwise follow up with your PCP in 1 week if symptoms are not improving.  Worrisome symptoms discussed with instructions to go to the ED.  Patient verbalized understanding and agreed with this plan.         Terry Randolph  4/1/2019, 7:54 PM

## 2019-04-02 NOTE — PATIENT INSTRUCTIONS
Patient Education     Viral Bronchitis (Adult)    You have a viral bronchitis. Bronchitis is inflammation and swelling of the lining of the lungs. This is often caused by an infection. Symptoms include a dry, hacking cough that is worse at night. The cough may bring up yellow-green mucus. You may also feel short of breath or wheeze. Other symptoms may include tiredness, chest discomfort, and chills.  Bronchitis that is caused by a virus is not treated with antibiotics. Instead, medicines may be given to help relieve symptoms. Symptoms can last up to 2 weeks, although the cough may last much longer.  This illness is contagious during the first few days and is spread through the air by coughing and sneezing, or by direct contact (touching the sick person and then touching your own eyes, nose, or mouth).  Most viral illnesses resolve within 10 to 14 days with rest and simple home remedies, although they may sometimes last for several weeks.  Home care    If symptoms are severe, rest at home for the first 2 to 3 days. When you go back to your usual activities, don't let yourself get too tired.    Do not smoke. Also avoid being exposed to secondhand smoke.    You may use over-the-counter medicine to control fever or pain, unless another pain medicine was prescribed. If you have chronic liver or kidney disease or have ever had a stomach ulcer or gastrointestinal bleeding, talk with your healthcare provider before using these medicines. Also talk to your provider if you are taking medicine to prevent blood clots. Aspirin should never be given to anyone younger than 18 years of age who is ill with a viral infection or fever. It may cause severe liver or brain damage.    Your appetite may be poor, so a light diet is fine. Avoid dehydration by drinking 6 to 8 glasses of fluids per day (such as water, soft drinks, sports drinks, juices, tea, or soup). Extra fluids will help loosen secretions in the nose and  lungs.    Over-the-counter cough, cold, and sore-throat medicines will not shorten the length of the illness, but they may help to reduce symptoms. Don't use decongestants if you have high blood pressure.  Follow-up care  Follow up with your healthcare provider, or as advised. If you had an X-ray or ECG (electrocardiogram), a specialist will review it. You will be notified of any new findings that may affect your care.  If you are age 65 or older, or if you have a chronic lung disease or condition that affects your immune system, or you smoke, ask your healthcare provider about getting a pneumococcal vaccine and a yearly flu shot (influenza vaccine).  When to seek medical advice  Call your healthcare provider right away if any of these occur:    Fever of 100.4 F (38 C) or higher, or as directed by your healthcare provider    Coughing up increased amounts of colored sputum    Weakness, drowsiness, headache, facial pain, ear pain, or a stiff neck  Call 911  Call 911 if any of these occur:    Coughing up blood    Worsening weakness, drowsiness, headache, or stiff neck    Trouble breathing, wheezing, or pain with breathing  Date Last Reviewed: 6/1/2018 2000-2018 [employer]. [address] [place] [address], [place] [address]. All rights reserved. This information is not intended as a substitute for professional medical care. Always follow your healthcare professional's instructions.

## 2019-05-20 ENCOUNTER — TELEPHONE (OUTPATIENT)
Dept: FAMILY MEDICINE | Facility: CLINIC | Age: 59
End: 2019-05-20

## 2019-05-20 ENCOUNTER — OFFICE VISIT (OUTPATIENT)
Dept: FAMILY MEDICINE | Facility: CLINIC | Age: 59
End: 2019-05-20
Payer: COMMERCIAL

## 2019-05-20 VITALS
SYSTOLIC BLOOD PRESSURE: 136 MMHG | HEART RATE: 92 BPM | WEIGHT: 197 LBS | BODY MASS INDEX: 29.18 KG/M2 | DIASTOLIC BLOOD PRESSURE: 79 MMHG | HEIGHT: 69 IN | TEMPERATURE: 97.7 F

## 2019-05-20 DIAGNOSIS — G89.4 CHRONIC PAIN DISORDER: ICD-10-CM

## 2019-05-20 DIAGNOSIS — Z86.0100 HISTORY OF COLONIC POLYPS: ICD-10-CM

## 2019-05-20 DIAGNOSIS — Z12.5 SCREENING FOR PROSTATE CANCER: ICD-10-CM

## 2019-05-20 DIAGNOSIS — E78.5 HYPERLIPIDEMIA LDL GOAL <130: ICD-10-CM

## 2019-05-20 DIAGNOSIS — R73.01 IMPAIRED FASTING GLUCOSE: ICD-10-CM

## 2019-05-20 DIAGNOSIS — I10 HYPERTENSION GOAL BP (BLOOD PRESSURE) < 140/90: Primary | ICD-10-CM

## 2019-05-20 DIAGNOSIS — M79.2 NEUROPATHIC PAIN SYNDROME (NON-HERPETIC): ICD-10-CM

## 2019-05-20 LAB
ALBUMIN SERPL-MCNC: 4.1 G/DL (ref 3.4–5)
ALP SERPL-CCNC: 100 U/L (ref 40–150)
ALT SERPL W P-5'-P-CCNC: 32 U/L (ref 0–70)
ANION GAP SERPL CALCULATED.3IONS-SCNC: 10 MMOL/L (ref 3–14)
AST SERPL W P-5'-P-CCNC: 21 U/L (ref 0–45)
BILIRUB SERPL-MCNC: 0.7 MG/DL (ref 0.2–1.3)
BUN SERPL-MCNC: 8 MG/DL (ref 7–30)
CALCIUM SERPL-MCNC: 9.3 MG/DL (ref 8.5–10.1)
CHLORIDE SERPL-SCNC: 106 MMOL/L (ref 94–109)
CHOLEST SERPL-MCNC: 173 MG/DL
CO2 SERPL-SCNC: 23 MMOL/L (ref 20–32)
CREAT SERPL-MCNC: 0.86 MG/DL (ref 0.66–1.25)
GFR SERPL CREATININE-BSD FRML MDRD: >90 ML/MIN/{1.73_M2}
GLUCOSE SERPL-MCNC: 88 MG/DL (ref 70–99)
HBA1C MFR BLD: 6.1 % (ref 0–5.6)
HDLC SERPL-MCNC: 52 MG/DL
LDLC SERPL CALC-MCNC: 106 MG/DL
NONHDLC SERPL-MCNC: 121 MG/DL
POTASSIUM SERPL-SCNC: 3.7 MMOL/L (ref 3.4–5.3)
PROT SERPL-MCNC: 7.4 G/DL (ref 6.8–8.8)
PSA SERPL-ACNC: 1.73 UG/L (ref 0–4)
SODIUM SERPL-SCNC: 139 MMOL/L (ref 133–144)
TRIGL SERPL-MCNC: 73 MG/DL

## 2019-05-20 PROCEDURE — 83036 HEMOGLOBIN GLYCOSYLATED A1C: CPT | Performed by: FAMILY MEDICINE

## 2019-05-20 PROCEDURE — G0103 PSA SCREENING: HCPCS | Performed by: FAMILY MEDICINE

## 2019-05-20 PROCEDURE — 36415 COLL VENOUS BLD VENIPUNCTURE: CPT | Performed by: FAMILY MEDICINE

## 2019-05-20 PROCEDURE — 80061 LIPID PANEL: CPT | Performed by: FAMILY MEDICINE

## 2019-05-20 PROCEDURE — 80053 COMPREHEN METABOLIC PANEL: CPT | Performed by: FAMILY MEDICINE

## 2019-05-20 PROCEDURE — 80307 DRUG TEST PRSMV CHEM ANLYZR: CPT | Mod: 90 | Performed by: FAMILY MEDICINE

## 2019-05-20 PROCEDURE — 99214 OFFICE O/P EST MOD 30 MIN: CPT | Performed by: FAMILY MEDICINE

## 2019-05-20 PROCEDURE — 99000 SPECIMEN HANDLING OFFICE-LAB: CPT | Performed by: FAMILY MEDICINE

## 2019-05-20 RX ORDER — LOSARTAN POTASSIUM 100 MG/1
100 TABLET ORAL DAILY
Qty: 90 TABLET | Refills: 3 | Status: SHIPPED | OUTPATIENT
Start: 2019-05-20 | End: 2020-02-24

## 2019-05-20 RX ORDER — PREGABALIN 150 MG/1
300 CAPSULE ORAL 3 TIMES DAILY
Qty: 540 CAPSULE | Refills: 3 | Status: SHIPPED | OUTPATIENT
Start: 2019-05-20 | End: 2020-02-04

## 2019-05-20 RX ORDER — SIMVASTATIN 40 MG
40 TABLET ORAL AT BEDTIME
Qty: 90 TABLET | Refills: 3 | Status: SHIPPED | OUTPATIENT
Start: 2019-05-20 | End: 2020-02-24

## 2019-05-20 RX ORDER — SERTRALINE HYDROCHLORIDE 100 MG/1
100 TABLET, FILM COATED ORAL DAILY
Qty: 90 TABLET | Refills: 3 | Status: SHIPPED | OUTPATIENT
Start: 2019-05-20 | End: 2020-02-24

## 2019-05-20 RX ORDER — ASPIRIN 81 MG/1
81 TABLET ORAL DAILY
Status: ON HOLD | COMMUNITY
End: 2023-12-18

## 2019-05-20 RX ORDER — HYDROCODONE BITARTRATE AND ACETAMINOPHEN 5; 325 MG/1; MG/1
1 TABLET ORAL EVERY 6 HOURS PRN
Qty: 100 TABLET | Refills: 0 | Status: SHIPPED | OUTPATIENT
Start: 2019-05-20 | End: 2020-02-24

## 2019-05-20 ASSESSMENT — PATIENT HEALTH QUESTIONNAIRE - PHQ9
SUM OF ALL RESPONSES TO PHQ QUESTIONS 1-9: 1
5. POOR APPETITE OR OVEREATING: NOT AT ALL

## 2019-05-20 ASSESSMENT — ANXIETY QUESTIONNAIRES
1. FEELING NERVOUS, ANXIOUS, OR ON EDGE: NOT AT ALL
5. BEING SO RESTLESS THAT IT IS HARD TO SIT STILL: NOT AT ALL
7. FEELING AFRAID AS IF SOMETHING AWFUL MIGHT HAPPEN: NOT AT ALL
GAD7 TOTAL SCORE: 0
3. WORRYING TOO MUCH ABOUT DIFFERENT THINGS: NOT AT ALL
IF YOU CHECKED OFF ANY PROBLEMS ON THIS QUESTIONNAIRE, HOW DIFFICULT HAVE THESE PROBLEMS MADE IT FOR YOU TO DO YOUR WORK, TAKE CARE OF THINGS AT HOME, OR GET ALONG WITH OTHER PEOPLE: NOT DIFFICULT AT ALL
2. NOT BEING ABLE TO STOP OR CONTROL WORRYING: NOT AT ALL
6. BECOMING EASILY ANNOYED OR IRRITABLE: NOT AT ALL

## 2019-05-20 ASSESSMENT — PAIN SCALES - GENERAL: PAINLEVEL: NO PAIN (0)

## 2019-05-20 ASSESSMENT — MIFFLIN-ST. JEOR: SCORE: 1703.97

## 2019-05-20 NOTE — PATIENT INSTRUCTIONS
Call to schedule colonoscopy    We will send you lab results    Increase walking/ exercise    Healthy low sugar, low salt    Avoid trans fats and minimize saturated fats

## 2019-05-20 NOTE — PROGRESS NOTES
Subjective     Alexey Reed is a 58 year old male who presents to clinic today for the following health issues:    HPI   Hyperlipidemia Follow-Up      Are you having any of the following symptoms? (Select all that apply)  No complaints of shortness of breath, chest pain or pressure.  No increased sweating or nausea with activity.  No left-sided neck or arm pain.  No complaints of pain in calves when walking 1-2 blocks.    Are you regularly taking any medication or supplement to lower your cholesterol?   Yes- cholesterol meds    Are you having muscle aches or other side effects that you think could be caused by your cholesterol lowering medication?  No      Hypertension Follow-up      Do you check your blood pressure regularly outside of the clinic? No     Are you following a low salt diet? No    Are your blood pressures ever more than 140 on the top number (systolic) OR more   than 90 on the bottom number (diastolic), for example 140/90? No    Amount of exercise or physical activity: None    Problems taking medications regularly: No    Medication side effects: none    Diet: low salt          Reviewed and updated as needed this visit by Provider         Review of Systems   Albuterol helps but not needing it now    Had the flu / resp bug, doing better    lyrica helps pain but makes breathing a little harder to breathe    Not checking blood pressure much    Left side of face is where nerve pain is    The 100 pain pills lasts 10 to 12 months    Mood okay    Dad had AAA, repaired    Walking for exercise      Objective    There were no vitals taken for this visit.  There is no height or weight on file to calculate BMI.  Physical Exam   GENERAL: healthy, alert and no distress  NECK: no adenopathy, no asymmetry, masses, or scars and thyroid normal to palpation  RESP: lungs clear to auscultation - no rales, rhonchi or wheezes  CV: regular rate and rhythm, normal S1 S2, no S3 or S4, no murmur, click or rub, no peripheral  edema and peripheral pulses strong  ABDOMEN: soft, nontender, no hepatosplenomegaly, no masses and bowel sounds normal  MS: no gross musculoskeletal defects noted, no edema  SKIN: no suspicious lesions or rashes; some dry skin on hands  No back or costovertebral angle tenderness   NEURO: Normal strength and tone, mentation intact and speech normal  PSYCH: mentation appears normal, affect normal/bright    Diagnostic Test Results:  Labs reviewed in Epic         ASSESSMENT / PLAN:  (I10) Hypertension goal BP (blood pressure) < 140/90  (primary encounter diagnosis)  Comment: blood pressure at goal  Plan: losartan (COZAAR) 100 MG tablet        Refill med     (M79.2) Neuropathic pain syndrome (non-herpetic)  Comment: needs med refill.  The prescription for 100 vicodin lasted 10 months.  The lyrica is ongoing.   Plan: Drug  Screen Comprehensive, Urine w/o Reported         Meds (Pain Care Package), pregabalin (LYRICA)         150 MG capsule, sertraline (ZOLOFT) 100 MG         tablet, HYDROcodone-acetaminophen (NORCO) 5-325        MG tablet             (E78.5) Hyperlipidemia LDL goal <130  Comment: check labs fasting/  Refill med   Plan: simvastatin (ZOCOR) 40 MG tablet, Lipid panel         reflex to direct LDL Fasting, Comprehensive         metabolic panel             (Z86.010) History of colonic polyps  Comment: patient to call and schedule colonoscopy.  History of polyps   Plan: GASTROENTEROLOGY ADULT REF PROCEDURE ONLY [facility] [phone]; [facility]         Surgery             (G89.4) Chronic pain disorder  Comment: refill med.  Also ordered urine drug screen.   Plan: HYDROcodone-acetaminophen (NORCO) 5-325 MG         tablet             (R73.01) Impaired fasting glucose  Comment: check   Plan: Hemoglobin A1c             (Z12.5) Screening for prostate cancer  Comment: psa   Plan: Prostate spec antigen screen               I reviewed the patient's medications, allergies, medical history, family  history, and social history.    David Morse MD

## 2019-05-20 NOTE — TELEPHONE ENCOUNTER
Prior Authorization Retail Medication Request    Medication/Dose: Norco  ICD code (if different than what is on RX):    Previously Tried and Failed:    Rationale:      Insurance Name:  [employer]  Insurance ID:  [id number]  Insurance Phone Number: [phone]      Pharmacy Information (if different than what is on RX)  Name:  [facility]  Phone:  [phone]

## 2019-05-21 ASSESSMENT — ANXIETY QUESTIONNAIRES: GAD7 TOTAL SCORE: 0

## 2019-05-21 NOTE — RESULT ENCOUNTER NOTE
"The LDL \"bad\" cholesterol is a bit high.     Hemoglobin a1c is in the \"prediabetes\" range.  No medications needed for this.     Keep working on healthy diet/exercise.     Other labs are okay.    The ultrasound order to check for aneurysm of abdominal aorta is still good so you can call and schedule that.      David Morse MD  "

## 2019-05-22 NOTE — TELEPHONE ENCOUNTER
Prior Authorization Approval    Authorization Effective Date: 5/20/2019  Authorization Expiration Date: 5/20/2020  Medication: Norco  Approved Dose/Quantity:    Reference #:     Insurance Company: [employer] [place] - Phone [phone] Fax [phone]  Expected CoPay:       CoPay Card Available:      Foundation Assistance Needed:    Which Pharmacy is filling the prescription (Not needed for infusion/clinic administered): [facility] - [place], [place] - [address]  Pharmacy Notified: Yes  Patient Notified: Yes

## 2019-05-26 LAB — COMPREHEN DRUG ANALYSIS UR: NORMAL

## 2019-06-05 ENCOUNTER — ANCILLARY PROCEDURE (OUTPATIENT)
Dept: ULTRASOUND IMAGING | Facility: CLINIC | Age: 59
End: 2019-06-05
Attending: FAMILY MEDICINE
Payer: COMMERCIAL

## 2019-06-05 DIAGNOSIS — Z82.49 FAMILY HISTORY OF ABDOMINAL AORTIC ANEURYSM: ICD-10-CM

## 2019-06-05 PROCEDURE — 76775 US EXAM ABDO BACK WALL LIM: CPT

## 2019-06-05 NOTE — RESULT ENCOUNTER NOTE
Your screening test was normal for an aneurysm.   Arlette Harvey PA-C  
0 = swallows foods/liquids without difficulty

## 2019-06-21 ASSESSMENT — MIFFLIN-ST. JEOR: SCORE: 1715.3

## 2019-06-28 ENCOUNTER — HOSPITAL ENCOUNTER (OUTPATIENT)
Facility: AMBULATORY SURGERY CENTER | Age: 59
Discharge: HOME | End: 2019-06-28
Attending: SURGERY | Admitting: SURGERY
Payer: COMMERCIAL

## 2019-06-28 VITALS
DIASTOLIC BLOOD PRESSURE: 91 MMHG | TEMPERATURE: 98.2 F | OXYGEN SATURATION: 96 % | HEIGHT: 70 IN | SYSTOLIC BLOOD PRESSURE: 129 MMHG | BODY MASS INDEX: 28.06 KG/M2 | HEART RATE: 79 BPM | WEIGHT: 196 LBS | RESPIRATION RATE: 18 BRPM

## 2019-06-28 LAB — COLONOSCOPY: NORMAL

## 2019-06-28 PROCEDURE — G8918 PT W/O PREOP ORDER IV AB PRO: HCPCS

## 2019-06-28 PROCEDURE — 99153 MOD SED SAME PHYS/QHP EA: CPT | Mod: 59 | Performed by: SURGERY

## 2019-06-28 PROCEDURE — G8907 PT DOC NO EVENTS ON DISCHARG: HCPCS

## 2019-06-28 PROCEDURE — 45385 COLONOSCOPY W/LESION REMOVAL: CPT | Mod: PT | Performed by: SURGERY

## 2019-06-28 PROCEDURE — 45385 COLONOSCOPY W/LESION REMOVAL: CPT

## 2019-06-28 PROCEDURE — 99152 MOD SED SAME PHYS/QHP 5/>YRS: CPT | Mod: 59 | Performed by: SURGERY

## 2019-06-28 PROCEDURE — 88305 TISSUE EXAM BY PATHOLOGIST: CPT | Performed by: SURGERY

## 2019-06-28 RX ORDER — LIDOCAINE 40 MG/G
CREAM TOPICAL
Status: DISCONTINUED | OUTPATIENT
Start: 2019-06-28 | End: 2019-06-29 | Stop reason: HOSPADM

## 2019-06-28 RX ORDER — FENTANYL CITRATE 50 UG/ML
INJECTION, SOLUTION INTRAMUSCULAR; INTRAVENOUS PRN
Status: DISCONTINUED | OUTPATIENT
Start: 2019-06-28 | End: 2019-06-28 | Stop reason: HOSPADM

## 2019-06-28 RX ORDER — ONDANSETRON 2 MG/ML
4 INJECTION INTRAMUSCULAR; INTRAVENOUS
Status: DISCONTINUED | OUTPATIENT
Start: 2019-06-28 | End: 2019-06-29 | Stop reason: HOSPADM

## 2019-07-03 LAB — COPATH REPORT: NORMAL

## 2019-11-03 ENCOUNTER — HEALTH MAINTENANCE LETTER (OUTPATIENT)
Age: 59
End: 2019-11-03

## 2020-01-26 ENCOUNTER — OFFICE VISIT (OUTPATIENT)
Dept: URGENT CARE | Facility: URGENT CARE | Age: 60
End: 2020-01-26
Payer: COMMERCIAL

## 2020-01-26 VITALS
HEART RATE: 121 BPM | WEIGHT: 186.4 LBS | BODY MASS INDEX: 26.75 KG/M2 | OXYGEN SATURATION: 94 % | TEMPERATURE: 98.2 F | SYSTOLIC BLOOD PRESSURE: 107 MMHG | DIASTOLIC BLOOD PRESSURE: 72 MMHG

## 2020-01-26 DIAGNOSIS — J01.00 SUBACUTE MAXILLARY SINUSITIS: Primary | ICD-10-CM

## 2020-01-26 PROCEDURE — 99213 OFFICE O/P EST LOW 20 MIN: CPT | Performed by: FAMILY MEDICINE

## 2020-01-26 RX ORDER — AMOXICILLIN 875 MG
875 TABLET ORAL 2 TIMES DAILY
Qty: 14 TABLET | Refills: 0 | Status: SHIPPED | OUTPATIENT
Start: 2020-01-26 | End: 2020-02-24

## 2020-01-26 NOTE — PROGRESS NOTES
SUBJECTIVE:   Alexey Reed is a 59 year old male presenting with a chief complaint of   Chief Complaint   Patient presents with     Sinus Problem     Patient complains of sinus problems and fever        He is an established patient of [facility].    Sinus congestion, left fontal and left maxillary area.   Dental appt 2 months ago.     Sinus infection hx 1 every 2 years   Sinus surgery 15 years ago to open up.     Review of Systems   Constitutional: Negative for chills, diaphoresis and fever.   HENT: Negative for congestion, ear pain, rhinorrhea and sore throat.    Respiratory: Negative for cough and shortness of breath.    Gastrointestinal: Negative for diarrhea, nausea and vomiting.   Skin: Negative for rash and wound.   Allergic/Immunologic: Negative for environmental allergies and food allergies.     Patient Active Problem List   Diagnosis     Neuropathic pain syndrome (non-herpetic)     HYPERLIPIDEMIA LDL GOAL <130     Tenosynovitis of thumb     AC joint arthropathy     Anxiety     Hyperlipidemia with target LDL less than 130     Chronic pain disorder     FH: prostate cancer     Hypertension goal BP (blood pressure) < 140/90      Past Medical History:   Diagnosis Date     Hyperlipaemia      Neuropathic pain syndrome (non-herpetic)      Unspecified essential hypertension      Family History   Problem Relation Age of Onset     Hypertension Mother      Arthritis Mother      Cancer Mother         BLADDER/ kidney     Gastrointestinal Disease Mother      Osteoporosis Mother      Hypertension Father      Prostate Cancer Father      Alcohol/Drug Father      Genitourinary Problems Father      Hypertension Maternal Grandmother      Cancer - colorectal Maternal Grandmother      Osteoporosis Maternal Grandfather      Alcohol/Drug Paternal Grandfather      Hypertension Brother      Hypertension Sister      Allergies Daughter      Hypertension Sister      Gynecology Sister      Neurologic Disorder Son 26        MS     Current  Outpatient Medications   Medication Sig Dispense Refill     albuterol (PROAIR HFA/PROVENTIL HFA/VENTOLIN HFA) 108 (90 Base) MCG/ACT inhaler INHALE 2 PUFFS INTO THE LUNGS EVERY 6 HOURS AS NEEDED FOR SHORTNESS OF BREATH OR DIFFICULT BREATHING OR WHEEZING 8.5 g 0     aspirin 81 MG EC tablet Take 81 mg by mouth daily       HYDROcodone-acetaminophen (NORCO) 5-325 MG tablet Take 1 tablet by mouth every 6 hours as needed for pain 100 tablet 0     losartan (COZAAR) 100 MG tablet Take 1 tablet (100 mg) by mouth daily 90 tablet 3     MULTI-VITAMIN OR TABS 1 TABLET DAILY       pregabalin (LYRICA) 150 MG capsule Take 2 capsules (300 mg) by mouth 3 times daily 540 capsule 3     sertraline (ZOLOFT) 100 MG tablet Take 1 tablet (100 mg) by mouth daily 90 tablet 3     simvastatin (ZOCOR) 40 MG tablet Take 1 tablet (40 mg) by mouth At Bedtime 90 tablet 3     Social History     Tobacco Use     Smoking status: Current Every Day Smoker     Packs/day: 0.50     Years: 21.00     Pack years: 10.50     Types: Cigarettes     Smokeless tobacco: Never Used   Substance Use Topics     Alcohol use: No       OBJECTIVE  /72 (BP Location: Left arm, Patient Position: Chair, Cuff Size: Adult Regular)   Pulse 121   Temp 98.2  F (36.8  C) (Oral)   Wt 84.6 kg (186 lb 6.4 oz)   SpO2 94%   BMI 26.75 kg/m      Physical Exam  HENT:      Head: Normocephalic and atraumatic.      Right Ear: External ear normal.      Left Ear: External ear normal.      Nose: No nasal tenderness.      Right Nostril: No epistaxis or septal hematoma.      Left Nostril: No epistaxis or septal hematoma.      Right Sinus: No maxillary sinus tenderness or frontal sinus tenderness.      Left Sinus: Maxillary sinus tenderness and frontal sinus tenderness present.      Comments: Sinus mucosa      Mouth/Throat:      Pharynx: No oropharyngeal exudate.   Eyes:      General: No scleral icterus.        Right eye: No discharge.         Left eye: No discharge.      Conjunctiva/sclera:  Conjunctivae normal.      Pupils: Pupils are equal, round, and reactive to light.   Neck:      Musculoskeletal: Neck supple.      Thyroid: No thyromegaly.      Trachea: No tracheal deviation.   Cardiovascular:      Rate and Rhythm: Normal rate and regular rhythm.      Heart sounds: Normal heart sounds. No murmur. No friction rub. No gallop.    Pulmonary:      Effort: Pulmonary effort is normal. No respiratory distress.      Breath sounds: Normal breath sounds. No stridor. No wheezing or rales.   Chest:      Chest wall: No tenderness.   Abdominal:      General: Bowel sounds are normal. There is no distension.      Palpations: Abdomen is soft. There is no mass.      Tenderness: There is no abdominal tenderness. There is no guarding or rebound.   Musculoskeletal:         General: No tenderness or deformity.   Lymphadenopathy:      Cervical: No cervical adenopathy.   Skin:     General: Skin is warm and dry.      Capillary Refill: Capillary refill takes less than 2 seconds.      Findings: No erythema or rash.   Neurological:      General: No focal deficit present.      Mental Status: He is alert and oriented to person, place, and time.      Cranial Nerves: No cranial nerve deficit.   Psychiatric:         Judgment: Judgment normal.         ASSESSMENT:    ICD-10-CM    1. Subacute maxillary sinusitis J01.00 amoxicillin (AMOXIL) 875 MG tablet      PLAN:  Adjunctive measures such as nasal saline use of nasal steroid sprays OTC  for allergy described   The patient indicates understanding of these issues and agrees with the plan.   Patient educational/instructional material provided including reasons for follow-up   Dominguez Juarez MD

## 2020-01-28 ASSESSMENT — ENCOUNTER SYMPTOMS
SHORTNESS OF BREATH: 0
SORE THROAT: 0
WOUND: 0
DIARRHEA: 0
CHILLS: 0
RHINORRHEA: 0
VOMITING: 0
COUGH: 0
FEVER: 0
DIAPHORESIS: 0
NAUSEA: 0

## 2020-01-29 DIAGNOSIS — R06.2 WHEEZING: ICD-10-CM

## 2020-01-29 DIAGNOSIS — R06.02 SOB (SHORTNESS OF BREATH): ICD-10-CM

## 2020-01-30 NOTE — TELEPHONE ENCOUNTER
"Requested Prescriptions   Pending Prescriptions Disp Refills     albuterol (PROAIR HFA/PROVENTIL HFA/VENTOLIN HFA) 108 (90 Base) MCG/ACT inhaler [Pharmacy Med Name: ALBUTEROL HFA INH (200 PUFFS) 8.5GM] 8.5 g 0     Sig: INHALE 2 PUFFS INTO THE LUNGS EVERY 6 HOURS AS NEEDED FOR SHORTNESS OF BREATH OR DIFFICULT BREATHING OR WHEEZING   Last Written Prescription Date:  12/9/19  Last Fill Quantity: 8.5,  # refills: 0   Last office visit: 5/20/2019 with prescribing provider:     Future Office Visit:        Asthma Maintenance Inhalers - Anticholinergics Passed - 1/29/2020  6:41 PM        Passed - Patient is age 12 years or older        Passed - Recent (12 mo) or future (30 days) visit within the authorizing provider's specialty     Patient has had an office visit with the authorizing provider or a provider within the authorizing providers department within the previous 12 mos or has a future within next 30 days. See \"Patient Info\" tab in inbasket, or \"Choose Columns\" in Meds & Orders section of the refill encounter.              Passed - Medication is active on med list            "

## 2020-01-31 RX ORDER — ALBUTEROL SULFATE 90 UG/1
AEROSOL, METERED RESPIRATORY (INHALATION)
Qty: 8.5 G | Refills: 0 | Status: SHIPPED | OUTPATIENT
Start: 2020-01-31 | End: 2020-03-04

## 2020-01-31 NOTE — TELEPHONE ENCOUNTER
Prescription approved per [facility] Refill Protocol.  Marry Lobo RN on 1/31/2020 at 10:52 AM

## 2020-02-24 ENCOUNTER — OFFICE VISIT (OUTPATIENT)
Dept: FAMILY MEDICINE | Facility: CLINIC | Age: 60
End: 2020-02-24
Payer: COMMERCIAL

## 2020-02-24 VITALS
SYSTOLIC BLOOD PRESSURE: 134 MMHG | DIASTOLIC BLOOD PRESSURE: 85 MMHG | WEIGHT: 192 LBS | BODY MASS INDEX: 27.55 KG/M2 | TEMPERATURE: 98 F | HEART RATE: 111 BPM

## 2020-02-24 DIAGNOSIS — M79.2 NEUROPATHIC PAIN SYNDROME (NON-HERPETIC): Primary | ICD-10-CM

## 2020-02-24 DIAGNOSIS — I10 HYPERTENSION GOAL BP (BLOOD PRESSURE) < 140/90: ICD-10-CM

## 2020-02-24 DIAGNOSIS — E78.5 HYPERLIPIDEMIA LDL GOAL <130: ICD-10-CM

## 2020-02-24 DIAGNOSIS — R73.01 IMPAIRED FASTING GLUCOSE: ICD-10-CM

## 2020-02-24 DIAGNOSIS — F41.9 ANXIETY: ICD-10-CM

## 2020-02-24 DIAGNOSIS — G89.4 CHRONIC PAIN DISORDER: ICD-10-CM

## 2020-02-24 PROCEDURE — 99214 OFFICE O/P EST MOD 30 MIN: CPT | Performed by: FAMILY MEDICINE

## 2020-02-24 PROCEDURE — 80307 DRUG TEST PRSMV CHEM ANLYZR: CPT | Mod: 90 | Performed by: FAMILY MEDICINE

## 2020-02-24 PROCEDURE — 99000 SPECIMEN HANDLING OFFICE-LAB: CPT | Performed by: FAMILY MEDICINE

## 2020-02-24 RX ORDER — PREGABALIN 150 MG/1
CAPSULE ORAL
Qty: 540 CAPSULE | Refills: 1 | Status: SHIPPED | OUTPATIENT
Start: 2020-02-24 | End: 2020-09-08

## 2020-02-24 RX ORDER — HYDROCODONE BITARTRATE AND ACETAMINOPHEN 5; 325 MG/1; MG/1
1 TABLET ORAL EVERY 6 HOURS PRN
Qty: 100 TABLET | Refills: 0 | Status: SHIPPED | OUTPATIENT
Start: 2020-02-24 | End: 2020-08-18

## 2020-02-24 RX ORDER — SERTRALINE HYDROCHLORIDE 100 MG/1
100 TABLET, FILM COATED ORAL DAILY
Qty: 90 TABLET | Refills: 3 | Status: SHIPPED | OUTPATIENT
Start: 2020-02-24 | End: 2021-04-07

## 2020-02-24 RX ORDER — SIMVASTATIN 40 MG
40 TABLET ORAL AT BEDTIME
Qty: 90 TABLET | Refills: 3 | Status: SHIPPED | OUTPATIENT
Start: 2020-02-24 | End: 2021-04-07

## 2020-02-24 RX ORDER — LOSARTAN POTASSIUM 100 MG/1
100 TABLET ORAL DAILY
Qty: 90 TABLET | Refills: 3 | Status: SHIPPED | OUTPATIENT
Start: 2020-02-24 | End: 2021-04-07

## 2020-02-24 ASSESSMENT — PAIN SCALES - GENERAL: PAINLEVEL: SEVERE PAIN (7)
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
## 2020-02-24 NOTE — PATIENT INSTRUCTIONS
Stay on same medications    Keep working on healthy diet/exercise     Call/ return to clinic with problems/ questions

## 2020-02-26 LAB — COMPREHEN DRUG ANALYSIS UR: NORMAL

## 2020-03-03 DIAGNOSIS — R06.2 WHEEZING: ICD-10-CM

## 2020-03-03 DIAGNOSIS — R06.02 SOB (SHORTNESS OF BREATH): ICD-10-CM

## 2020-03-03 RX ORDER — ALBUTEROL SULFATE 90 UG/1
AEROSOL, METERED RESPIRATORY (INHALATION)
Qty: 8.5 G | Refills: 0 | Status: CANCELLED | OUTPATIENT
Start: 2020-03-03

## 2020-03-03 NOTE — TELEPHONE ENCOUNTER
"Requested Prescriptions   Pending Prescriptions Disp Refills     albuterol (PROAIR HFA/PROVENTIL HFA/VENTOLIN HFA) 108 (90 Base) MCG/ACT inhaler 8.5 g 0   Last Written Prescription Date:  1/31/20  Last Fill Quantity: 8.5,  # refills: 0   Last office visit: 2/24/2020 with prescribing provider:     Future Office Visit:        Asthma Maintenance Inhalers - Anticholinergics Passed - 3/3/2020  9:50 AM        Passed - Patient is age 12 years or older        Passed - Recent (12 mo) or future (30 days) visit within the authorizing provider's specialty     Patient has had an office visit with the authorizing provider or a provider within the authorizing providers department within the previous 12 mos or has a future within next 30 days. See \"Patient Info\" tab in inbasket, or \"Choose Columns\" in Meds & Orders section of the refill encounter.              Passed - Medication is active on med list          "

## 2020-03-04 NOTE — TELEPHONE ENCOUNTER
Routing refill request to provider for review/approval because:  Rx last prescribed for wheezing/SOB.

## 2020-04-29 ENCOUNTER — TELEPHONE (OUTPATIENT)
Dept: FAMILY MEDICINE | Facility: CLINIC | Age: 60
End: 2020-04-29

## 2020-04-29 NOTE — TELEPHONE ENCOUNTER
Central Prior Authorization Team   Phone: [phone]      PA NOT NEEDED    Medication: Pregabalin 150MG Capsules  Insurance Company: [employer] - Phone [phone] Fax [phone]  Pharmacy Filling the Rx: [id number] [place] [id number] [place] [address] [facility] [address]  Filling Pharmacy Phone: [phone]  Filling Pharmacy Fax:    Start Date: 4/29/2020    Started PA on CMM and a response of Drug is covered by current benefit plan. No further PA activity needed.  Called and spoke with Jeff at [facility], he states that there is no review that can be competed for a PA, he shows a DUR rejection at the pharmacy and they need to call the pharmacy help desk ([phone]) to get an override.  Called and informed pharmacy.  Pharmacy will notify patient when medication is ready.

## 2020-04-29 NOTE — TELEPHONE ENCOUNTER
Prior Authorization Retail Medication Request    Medication/Dose: Pregabalin 150MG Capsules  ICD code (if different than what is on RX):    Previously Tried and Failed:    Rationale:      Insurance Name:  [employer]  Insurance ID:  [id number]      Pharmacy Information (if different than what is on RX)  Name:  [facility]   Phone:  [phone]      CLAUS Coello MA

## 2020-06-11 DIAGNOSIS — R06.02 SOB (SHORTNESS OF BREATH): ICD-10-CM

## 2020-06-11 DIAGNOSIS — R06.2 WHEEZING: ICD-10-CM

## 2020-06-15 RX ORDER — ALBUTEROL SULFATE 90 UG/1
POWDER, METERED RESPIRATORY (INHALATION)
Qty: 1 INHALER | Refills: 3 | Status: SHIPPED | OUTPATIENT
Start: 2020-06-15 | End: 2020-09-17

## 2020-06-15 NOTE — TELEPHONE ENCOUNTER
Prescription approved per [facility] [place].  Patrica Arevalo, PharmD  Medication Therapy Management Pharmacist  [phone]

## 2020-08-08 ENCOUNTER — OFFICE VISIT (OUTPATIENT)
Dept: URGENT CARE | Facility: URGENT CARE | Age: 60
End: 2020-08-08
Payer: COMMERCIAL

## 2020-08-08 VITALS
OXYGEN SATURATION: 96 % | DIASTOLIC BLOOD PRESSURE: 77 MMHG | HEART RATE: 81 BPM | WEIGHT: 198.9 LBS | TEMPERATURE: 96.9 F | RESPIRATION RATE: 18 BRPM | SYSTOLIC BLOOD PRESSURE: 132 MMHG | BODY MASS INDEX: 28.54 KG/M2

## 2020-08-08 DIAGNOSIS — S50.819A ABRASION, FOREARM W/O INFECTION: Primary | ICD-10-CM

## 2020-08-08 PROCEDURE — 99213 OFFICE O/P EST LOW 20 MIN: CPT | Performed by: FAMILY MEDICINE

## 2020-08-08 ASSESSMENT — ENCOUNTER SYMPTOMS
VOMITING: 0
RHINORRHEA: 0
FEVER: 0
DIARRHEA: 0
SHORTNESS OF BREATH: 0
COUGH: 0
NAUSEA: 0
DIAPHORESIS: 0
SORE THROAT: 0
CHILLS: 0

## 2020-08-08 NOTE — PROGRESS NOTES
SUBJECTIVE:   Alexey Reed is a 59 year old male presenting with a chief complaint of   Chief Complaint   Patient presents with     Fall     Fall off a melissa last week, has a sore on left hand, look like a hole in left arm.       He is a new patient of [facility].    Delgado is a 59-year-old male presenting today with a wound on his left ulnar side dorsum of his forearm.  He was out walking his dog a couple weeks ago while in [place] when a chipmunk spooked his dog he was pulled down the side of the melissa falling about 10 feet onto a rock and sustained a wound to his left arm.  He presented to [facility] at that time was evaluated in the ER for a dislocated left finger and a sternal contusion.  They did so the proximal volar aspect of his left fifth digit with active with 4 sutures.  His forearm was not sutured.    Review of Systems   Constitutional: Negative for chills, diaphoresis and fever.   HENT: Negative for congestion, ear pain, rhinorrhea and sore throat.    Respiratory: Negative for cough and shortness of breath.    Gastrointestinal: Negative for diarrhea, nausea and vomiting.       Past Medical History:   Diagnosis Date     Hyperlipaemia      Neuropathic pain syndrome (non-herpetic)      Unspecified essential hypertension      Family History   Problem Relation Age of Onset     Hypertension Mother      Arthritis Mother      Cancer Mother         BLADDER/ kidney     Gastrointestinal Disease Mother      Osteoporosis Mother      Hypertension Father      Prostate Cancer Father      Alcohol/Drug Father      Genitourinary Problems Father      Hypertension Maternal Grandmother      Cancer - colorectal Maternal Grandmother      Osteoporosis Maternal Grandfather      Alcohol/Drug Paternal Grandfather      Hypertension Brother      Hypertension Sister      Allergies Daughter      Hypertension Sister      Gynecology Sister      Neurologic Disorder Son 26        MS     Current Outpatient  Medications   Medication Sig Dispense Refill     albuterol (PROAIR RESPICLICK) 108 (90 Base) MCG/ACT inhaler INHALE 2 PUFFS INTO THE LUNGS EVERY 6 HOURS AS NEEDED FOR SHORTNESS OF BREATH OR DIFFICULT BREATHING OR WHEEZING 1 Inhaler 3     aspirin 81 MG EC tablet Take 81 mg by mouth daily       losartan (COZAAR) 100 MG tablet Take 1 tablet (100 mg) by mouth daily 90 tablet 3     MULTI-VITAMIN OR TABS 1 TABLET DAILY       pregabalin (LYRICA) 150 MG capsule TAKE 2 CAPSULE BY MOUTH THREE TIMES DAILY 540 capsule 1     sertraline (ZOLOFT) 100 MG tablet Take 1 tablet (100 mg) by mouth daily 90 tablet 3     simvastatin (ZOCOR) 40 MG tablet Take 1 tablet (40 mg) by mouth At Bedtime 90 tablet 3     HYDROcodone-acetaminophen (NORCO) 5-325 MG tablet Take 1 tablet by mouth every 6 hours as needed for pain (Patient not taking: Reported on 8/8/2020) 100 tablet 0     Social History     Tobacco Use     Smoking status: Current Every Day Smoker     Packs/day: 0.50     Years: 21.00     Pack years: 10.50     Types: Cigarettes     Smokeless tobacco: Never Used   Substance Use Topics     Alcohol use: No       OBJECTIVE  /77 (BP Location: Right arm, Patient Position: Sitting, Cuff Size: Adult Regular)   Pulse 81   Temp 96.9  F (36.1  C) (Tympanic)   Resp 18   Wt 90.2 kg (198 lb 14.4 oz)   SpO2 96%   BMI 28.54 kg/m      Physical Exam  HENT:      Head: Normocephalic.   Cardiovascular:      Rate and Rhythm: Normal rate.      Pulses: Normal pulses.   Pulmonary:      Effort: Pulmonary effort is normal.   Skin:     General: Skin is warm.   Neurological:      Mental Status: He is alert.       Does have a fairly large abrasion 4 cm x 3 cm  that is gapping on the left dorsum of his distal forearm minimal serous only drainage from the area  Does appear to be healing appropriately via secondary intention.  There is no sign of infection discussed full range of motion at the wrist and hand without any evidence of cellulitis.    Left digit is  healing nicely the fifth digit on the volar aspect appears to be healing well sutures have been removed he does have moderate swelling and therefore some loss of range of motion on flexion although minimal appropriate for the duration of his injury.    ASSESSMENT:    ICD-10-CM    1. Abrasion, forearm w/o infection  S50.819A         PLAN:  He will continue to monitor for any increasing redness, spreading redness,  discharge or drainage from the wound or  increasing pain or fever   Bacitracin and dressing changes were provided.   If any these develop he will return immediately to the urgent care or his primary physician for further evaluation possibly antibiotics  He did complete a course of antibiotic about a week ago   Dominguez Juarez MD

## 2020-08-18 ENCOUNTER — OFFICE VISIT (OUTPATIENT)
Dept: FAMILY MEDICINE | Facility: CLINIC | Age: 60
End: 2020-08-18
Payer: COMMERCIAL

## 2020-08-18 ENCOUNTER — TELEPHONE (OUTPATIENT)
Dept: FAMILY MEDICINE | Facility: CLINIC | Age: 60
End: 2020-08-18

## 2020-08-18 VITALS
HEIGHT: 70 IN | WEIGHT: 196 LBS | SYSTOLIC BLOOD PRESSURE: 130 MMHG | OXYGEN SATURATION: 98 % | BODY MASS INDEX: 28.06 KG/M2 | TEMPERATURE: 98.2 F | HEART RATE: 80 BPM | DIASTOLIC BLOOD PRESSURE: 68 MMHG

## 2020-08-18 DIAGNOSIS — R07.89 STERNAL PAIN: ICD-10-CM

## 2020-08-18 DIAGNOSIS — G89.4 CHRONIC PAIN DISORDER: ICD-10-CM

## 2020-08-18 DIAGNOSIS — F17.200 NICOTINE DEPENDENCE, UNCOMPLICATED, UNSPECIFIED NICOTINE PRODUCT TYPE: ICD-10-CM

## 2020-08-18 DIAGNOSIS — M79.2 NEUROPATHIC PAIN SYNDROME (NON-HERPETIC): ICD-10-CM

## 2020-08-18 DIAGNOSIS — R91.8 PULMONARY NODULES: ICD-10-CM

## 2020-08-18 DIAGNOSIS — Z09 HOSPITAL DISCHARGE FOLLOW-UP: Primary | ICD-10-CM

## 2020-08-18 PROCEDURE — 99214 OFFICE O/P EST MOD 30 MIN: CPT | Performed by: NURSE PRACTITIONER

## 2020-08-18 RX ORDER — BUPROPION HYDROCHLORIDE 150 MG/1
TABLET, FILM COATED, EXTENDED RELEASE ORAL
Qty: 57 TABLET | Refills: 0 | Status: SHIPPED | OUTPATIENT
Start: 2020-08-18 | End: 2020-09-08

## 2020-08-18 RX ORDER — HYDROCODONE BITARTRATE AND ACETAMINOPHEN 5; 325 MG/1; MG/1
1 TABLET ORAL EVERY 6 HOURS PRN
Qty: 100 TABLET | Refills: 0 | Status: SHIPPED | OUTPATIENT
Start: 2020-08-18 | End: 2020-08-26

## 2020-08-18 RX ORDER — NAPROXEN 500 MG/1
500 TABLET ORAL 2 TIMES DAILY WITH MEALS
Qty: 60 TABLET | Refills: 0 | Status: SHIPPED | OUTPATIENT
Start: 2020-08-18 | End: 2020-09-08

## 2020-08-18 ASSESSMENT — MIFFLIN-ST. JEOR: SCORE: 1710.3

## 2020-08-18 NOTE — Clinical Note
Mitch Morse,  I saw this pt of yours today for hospital follow-up please see my assessment and plan for details but briefly he will need a repeat chest CT in 6 to 12 months for a pulmonary nodule found incidentally on chest CT.  I have ordered this.  Yari

## 2020-08-18 NOTE — PATIENT INSTRUCTIONS
Get repeat Chest CT scan for pulmonary nodule in 6-12 months f/u with PCP    Bath refilled discussed to use sparingly    Recommend using naproxen 500 mg twice daily for 1 to 2 weeks for your sternal pain       Zyban / Wellbutrin    Dosing:    Before Your Quit Date: Dose   Days 1-3 Take 150 mg by mouth once daily in the morning.   Days 4-7 (or up to 4-14 days) Take 150 mg by mouth twice daily in the morning and evening.   After Your Quit Date:    Treatment usually continues 7-12 weeks Take 150 mg by mouth twice daily in the morning and evening.       Some tips:    You will start taking bupropion at least 1 week before quitting smoking. It is okay to smoke while using bupropion.     You can use nicotine replacement therapy such as nicotine gum while using  Bupropion.     Take your 2 daily doses at least 8 hours apart.     DO NOT CRUSH OR BREAK TABLETS. Swallow the tablet whole.     You can take your medication with or without food.     Do not drink alcohol while taking this medication.     Side Effects:    Some people may experience dry mouth and insomnia. These may resolve in time.     Small frequent meals, frequent mouth care, chewing gum, or sucking lozenges may help with dry mouth.     Other possible rare side effects include constipation, nausea, headache, drowsiness, and weight loss may occur.     If you experience any other side effects that are troublesome, or if you feel something is not right, call your health care professional.

## 2020-08-18 NOTE — TELEPHONE ENCOUNTER
Clarification needed: Re: HYDROcodone-acetaminophen (NORCO) 5-325 MG tablet     Pharmacy note: Patient's insurance limits first 4 RX's to a 7 day supply, filled #28 only. Patient is also on Lyrica.

## 2020-08-18 NOTE — LETTER
[address] [address]  Phone: [phone]    August 18, 2020        Alexey Reed  [id number]  [facility] [address]          To whom it may concern:    RE: Alexey Reed    Patient was seen and treated today at our clinic and missed work.  Alexey will require a lifting restriction at work of lifting  no >10 lbs for the next 8 weeks.     Please contact me for questions or concerns.      Sincerely,        DOUGLAS Harris CNP

## 2020-08-18 NOTE — PROGRESS NOTES
Subjective     Alexey Reed is a 59 year old male who presents to clinic today for the following health issues:    HPI       ED/UC Followup:    Facility:  [facility]  Date of visit: 8/16/2020  Reason for visit: closed fracture of sternum   Current Status: in pain, and would like to discuss pain medications   Left pinky finger discolated    Interval history patient was hiking [place] [employer] with his wife and his dog, his dog saw a squirrel and forcefully jerked him as he was heading down a steep hill, he let go of the dog but he could not stop his fall and he fell into a large rock.  He was seen in the emergency department in [place] on 7/25/2020 he had an open fracture dislocation of his left pinky finger requiring sutures, and an open wound on his left wrist which required antibiotics.  Both have healed.    He said weeks later he began to have difficulty breathing and taking a deep breath in and noticed a large lump on his chest which prompted him to return to the emergency department on 8/16/2020 where CT chest scan revealed a nondisplaced sternal fracture.  Says his breathing has improved but he has a lot of sternal pain when trying to sleep and when moving around.  He works as an  and oftentimes lifts equipment at work he wants to know about work restrictions.  He is prescribed Norco 5/325 mg 100 mg tablets by his PCP for his chronic pain that usually lasts him a year.  Says because of this recent injury he has already used up his pain medications that he had prescribed back in February 2020, and that the ED only dispensed 10 tablets to him.    Of note, CT scan showed a 6 mm noncalcified nodule right upper lobe.  Patient was unaware of this finding.     Reports remote history of drug addiction and alcoholism he has been sober 20+ years.  He does smoke tobacco up to 1 pack/day.  He is interested in quitting.      CT scan results     IMPRESSION:   1.  Oblique acute to subacute nondisplaced sternal  fracture.    2.  2.8 x 1.8 cm low-attenuation collection in the left anterior chest wall  probably representing an intramuscular hematoma within the pectoralis  musculature.    3.  Mild emphysematous changes in the lungs.    4.  Technically indeterminate 6 mm noncalcified nodule right upper lobe.  Additional indeterminate scarlike 6 mm nodular density right upper lobe.  Follow-up CT in six months is recommended to monitor for stability.    Fleischner Society Recommendations for Pulmonary Nodules    Nodule size greater than 6 mm up to 8 mm:    CT at 6-12 months, then consider CT at 18-24 months      Patient Active Problem List   Diagnosis     Neuropathic pain syndrome (non-herpetic)     HYPERLIPIDEMIA LDL GOAL <130     Tenosynovitis of thumb     AC joint arthropathy     Anxiety     Hyperlipidemia with target LDL less than 130     Chronic pain disorder     FH: prostate cancer     Hypertension goal BP (blood pressure) < 140/90     Past Surgical History:   Procedure Laterality Date     COLONOSCOPY       COLONOSCOPY WITH CO2 INSUFFLATION N/A 6/28/2019    Procedure: COLONOSCOPY, WITH CO2 INSUFFLATION;  Surgeon: Armin Carcamo DO;  Location: [facility] OR     HEAD & NECK SURGERY       SINUS SURGERY       TONSILLECTOMY & ADENOIDECTOMY         Social History     Tobacco Use     Smoking status: Current Every Day Smoker     Packs/day: 0.50     Years: 21.00     Pack years: 10.50     Types: Cigarettes     Smokeless tobacco: Never Used   Substance Use Topics     Alcohol use: No     Family History   Problem Relation Age of Onset     Hypertension Mother      Arthritis Mother      Cancer Mother         BLADDER/ kidney     Gastrointestinal Disease Mother      Osteoporosis Mother      Hypertension Father      Prostate Cancer Father      Alcohol/Drug Father      Genitourinary Problems Father      Hypertension Maternal Grandmother      Cancer - colorectal Maternal Grandmother      Osteoporosis Maternal Grandfather      Alcohol/Drug Paternal  "Grandfather      Hypertension Brother      Hypertension Sister      Allergies Daughter      Hypertension Sister      Gynecology Sister      Neurologic Disorder Son 26        MS           Reviewed and updated as needed this visit by Provider         Review of Systems   Constitutional, HEENT, cardiovascular, pulmonary, GI, , musculoskeletal, neuro, skin, endocrine and psych systems are negative, except as otherwise noted.      Objective    /68   Pulse 80   Temp 98.2  F (36.8  C) (Oral)   Ht 1.778 m (5' 10\")   Wt 88.9 kg (196 lb)   SpO2 98%   BMI 28.12 kg/m    There is no height or weight on file to calculate BMI.  Physical Exam   GENERAL: healthy, alert and no distress  EYES: Eyes grossly normal to inspection, PERRL and conjunctivae and sclerae normal  HENT: ear canals and TM's normal, nose and mouth without ulcers or lesions  NECK: no adenopathy, no asymmetry, masses, or scars and thyroid normal to palpation  RESP: golf ball size hematoma chest, lungs clear to auscultation - no rales, rhonchi or wheezes  CV: regular rate and rhythm, normal S1 S2, no S3 or S4, no murmur, click or rub, no peripheral edema and peripheral pulses strong  ABDOMEN: soft, nontender, no hepatosplenomegaly, no masses and bowel sounds normal  MS: left pinky deformity   SKIN: left wrist wound/scab healing, and left pinky laceration healed   NEURO: Normal strength and tone, mentation intact and speech normal  PSYCH: mentation appears normal, affect normal/bright    Diagnostic Test Results:  Labs reviewed in Epic  No results found for this or any previous visit (from the past 24 hour(s)).        Assessment & Plan       ICD-10-CM    1. Hospital discharge follow-up  Z09    2. Neuropathic pain syndrome (non-herpetic)  M79.2 HYDROcodone-acetaminophen (NORCO) 5-325 MG tablet   3. Chronic pain disorder  G89.4 HYDROcodone-acetaminophen (NORCO) 5-325 MG tablet   4. Sternal pain  R07.89 HYDROcodone-acetaminophen (NORCO) 5-325 MG tablet     " "naproxen (NAPROSYN) 500 MG tablet   5. Nicotine dependence, uncomplicated, unspecified nicotine product type  F17.200 buPROPion (ZYBAN) 150 MG 12 hr tablet     Emergency department follow-up patient was seen on 7/24/2020 and again on 8/16/2020 for traumatic fall which resulted in multiple injuries including an oblique sternal fracture, he currently has a large hematoma on his chest.   Discussed splinting, avoid heavy lifting letter written for work, agreed to refill his Norco prescription and he will need to follow-up with his PCP for any further refills.  [place]  was also reviewed.  Recommend he use anti-inflammatories PRN,  naproxen prescribed aware he has hypertension but this is well controlled and he can certainly take this for 1 to 2 weeks for his acute pain with minimal risk.    Chest CT scan showed incidental finding of 6 mm pulmonary nodule.  Patient is a pack per day tobacco smoker.  Radiology recommends repeat chest CT in 6 to 12 months.  I discussed these findings with patient today, I have placed future order and I will forward chart to PCP to advise.    He would like to quit smoking.  Discussed medication assisted treatment options and he would like to try bupropion.    Tobacco Cessation:   reports that he has been smoking cigarettes. He has a 10.50 pack-year smoking history. He has never used smokeless tobacco.  Tobacco Cessation Action Plan: Pharmacotherapies : Zyban/Wellbutrin      BMI:   Estimated body mass index is 28.12 kg/m  as calculated from the following:    Height as of this encounter: 1.778 m (5' 10\").    Weight as of this encounter: 88.9 kg (196 lb).   Weight management plan: Discussed healthy diet and exercise guidelines        See Patient Instructions    No follow-ups on file.    DOUGLAS Harris [facility]    "

## 2020-08-24 ENCOUNTER — TELEPHONE (OUTPATIENT)
Dept: FAMILY MEDICINE | Facility: CLINIC | Age: 60
End: 2020-08-24

## 2020-08-24 ENCOUNTER — MYC MEDICAL ADVICE (OUTPATIENT)
Dept: FAMILY MEDICINE | Facility: CLINIC | Age: 60
End: 2020-08-24

## 2020-08-24 DIAGNOSIS — G89.4 CHRONIC PAIN DISORDER: ICD-10-CM

## 2020-08-24 DIAGNOSIS — S22.20XA CLOSED FRACTURE OF STERNUM, UNSPECIFIED PORTION OF STERNUM, INITIAL ENCOUNTER: Primary | ICD-10-CM

## 2020-08-24 DIAGNOSIS — R07.89 STERNAL PAIN: ICD-10-CM

## 2020-08-24 DIAGNOSIS — M79.2 NEUROPATHIC PAIN SYNDROME (NON-HERPETIC): ICD-10-CM

## 2020-08-24 NOTE — TELEPHONE ENCOUNTER
Requested Prescriptions   Pending Prescriptions Disp Refills     HYDROcodone-acetaminophen (NORCO) 5-325 MG tablet 100 tablet 0     Sig: Take 1 tablet by mouth every 6 hours as needed for pain       There is no refill protocol information for this order        Last Written Prescription Date:  818/2020  Last Fill Quantity: 100,  # refills: 0   Last office visit: 2/24/2020 with prescribing provider:  Deal- office Visit Connor 8/18/2020     Future Office Visit:          Routing refill request to provider for review/approval because:  Drug not on the [facility] refill protocol         Sahara Silverio RN  [facility]

## 2020-08-24 NOTE — TELEPHONE ENCOUNTER
Reason for Call:  Medication or medication refill:    Do you use a [facility]?  Name of the pharmacy and phone number for the current request:  [facility]/ [id number] [place] [address]     Name of the medication requested:   Patient had seen Yari Ryan last week and she had prescribed pain med. Patients insurance would only allow a 7 day fill so patient is requesting another refill. HYDROcodone-acetaminophen (NORCO) 5-325 MG tablet     Other request:     Can we leave a detailed message on this number? YES    Phone number patient can be reached at: Home number on file [phone] (home)    Best Time: any    Call taken on 8/24/2020 at 10:27 AM by Candi Prasad

## 2020-08-25 ENCOUNTER — MYC MEDICAL ADVICE (OUTPATIENT)
Dept: FAMILY MEDICINE | Facility: CLINIC | Age: 60
End: 2020-08-25

## 2020-08-25 NOTE — TELEPHONE ENCOUNTER
Routing to Dr. Morse.      Sahara Silverio RN  Triage Nurse  [facility]  Appointment line: [phone]  [facility], 24 hour nurse line, available by calling clinic at [phone] and following prompts.

## 2020-08-25 NOTE — TELEPHONE ENCOUNTER
Forwarding MyChart to Jeanne Ryan as FYI only. [facility] sent and closing encounter, as Dr. Morse is PCP.    Tatum Vila RN

## 2020-08-25 NOTE — TELEPHONE ENCOUNTER
As clearly outlined in my note, and discussed with patient he will need to see his PCP for any further refills.  I saw this patient one time for hospital follow-up and I was okay with prescribing him pain medication for his sternal fracture.

## 2020-08-26 RX ORDER — HYDROCODONE BITARTRATE AND ACETAMINOPHEN 5; 325 MG/1; MG/1
1 TABLET ORAL EVERY 6 HOURS PRN
Qty: 28 TABLET | Refills: 0 | Status: SHIPPED | OUTPATIENT
Start: 2020-08-26 | End: 2020-09-08

## 2020-08-26 NOTE — TELEPHONE ENCOUNTER
Okayed 28 tabs but advise clinic appointment if symptoms not resolving soon    Please inform patient    Of note I did confirm on [facility] website he got 28 pills last week  David Morse MD

## 2020-08-27 NOTE — TELEPHONE ENCOUNTER
Called and spoke with pt and relayed message below. Pt stated that he is so thankful for Dr. Morse and appreciates that call back.    CLAUS Coello MA

## 2020-09-08 ENCOUNTER — OFFICE VISIT (OUTPATIENT)
Dept: FAMILY MEDICINE | Facility: CLINIC | Age: 60
End: 2020-09-08
Payer: COMMERCIAL

## 2020-09-08 VITALS
TEMPERATURE: 98.4 F | HEART RATE: 93 BPM | BODY MASS INDEX: 28.05 KG/M2 | DIASTOLIC BLOOD PRESSURE: 84 MMHG | SYSTOLIC BLOOD PRESSURE: 131 MMHG | WEIGHT: 195.5 LBS

## 2020-09-08 DIAGNOSIS — S20.219A HEMATOMA OF CHEST WALL, UNSPECIFIED LATERALITY, INITIAL ENCOUNTER: ICD-10-CM

## 2020-09-08 DIAGNOSIS — Z72.0 TOBACCO ABUSE: ICD-10-CM

## 2020-09-08 DIAGNOSIS — R07.89 STERNAL PAIN: ICD-10-CM

## 2020-09-08 DIAGNOSIS — S22.20XA CLOSED FRACTURE OF STERNUM, UNSPECIFIED PORTION OF STERNUM, INITIAL ENCOUNTER: Primary | ICD-10-CM

## 2020-09-08 DIAGNOSIS — M79.2 NEUROPATHIC PAIN SYNDROME (NON-HERPETIC): ICD-10-CM

## 2020-09-08 DIAGNOSIS — R73.01 IMPAIRED FASTING GLUCOSE: ICD-10-CM

## 2020-09-08 DIAGNOSIS — Z12.5 SCREENING FOR PROSTATE CANCER: ICD-10-CM

## 2020-09-08 DIAGNOSIS — E78.5 HYPERLIPIDEMIA LDL GOAL <100: ICD-10-CM

## 2020-09-08 LAB
ALBUMIN SERPL-MCNC: 3.9 G/DL (ref 3.4–5)
ALP SERPL-CCNC: 109 U/L (ref 40–150)
ALT SERPL W P-5'-P-CCNC: 32 U/L (ref 0–70)
ANION GAP SERPL CALCULATED.3IONS-SCNC: 5 MMOL/L (ref 3–14)
AST SERPL W P-5'-P-CCNC: 22 U/L (ref 0–45)
BASOPHILS # BLD AUTO: 0.1 10E9/L (ref 0–0.2)
BASOPHILS NFR BLD AUTO: 0.6 %
BILIRUB SERPL-MCNC: 0.6 MG/DL (ref 0.2–1.3)
BUN SERPL-MCNC: 8 MG/DL (ref 7–30)
CALCIUM SERPL-MCNC: 9 MG/DL (ref 8.5–10.1)
CHLORIDE SERPL-SCNC: 107 MMOL/L (ref 94–109)
CHOLEST SERPL-MCNC: 179 MG/DL
CO2 SERPL-SCNC: 28 MMOL/L (ref 20–32)
CREAT SERPL-MCNC: 0.88 MG/DL (ref 0.66–1.25)
DIFFERENTIAL METHOD BLD: NORMAL
EOSINOPHIL # BLD AUTO: 0.4 10E9/L (ref 0–0.7)
EOSINOPHIL NFR BLD AUTO: 3.5 %
ERYTHROCYTE [DISTWIDTH] IN BLOOD BY AUTOMATED COUNT: 13.6 % (ref 10–15)
GFR SERPL CREATININE-BSD FRML MDRD: >90 ML/MIN/{1.73_M2}
GLUCOSE SERPL-MCNC: 115 MG/DL (ref 70–99)
HBA1C MFR BLD: 5.7 % (ref 0–5.6)
HCT VFR BLD AUTO: 50 % (ref 40–53)
HDLC SERPL-MCNC: 53 MG/DL
HGB BLD-MCNC: 16.4 G/DL (ref 13.3–17.7)
LDLC SERPL CALC-MCNC: 109 MG/DL
LYMPHOCYTES # BLD AUTO: 1.8 10E9/L (ref 0.8–5.3)
LYMPHOCYTES NFR BLD AUTO: 17.5 %
MCH RBC QN AUTO: 32.3 PG (ref 26.5–33)
MCHC RBC AUTO-ENTMCNC: 32.8 G/DL (ref 31.5–36.5)
MCV RBC AUTO: 98 FL (ref 78–100)
MONOCYTES # BLD AUTO: 1.1 10E9/L (ref 0–1.3)
MONOCYTES NFR BLD AUTO: 10.2 %
NEUTROPHILS # BLD AUTO: 7.1 10E9/L (ref 1.6–8.3)
NEUTROPHILS NFR BLD AUTO: 68.2 %
NONHDLC SERPL-MCNC: 126 MG/DL
PLATELET # BLD AUTO: 256 10E9/L (ref 150–450)
POTASSIUM SERPL-SCNC: 4.9 MMOL/L (ref 3.4–5.3)
PROT SERPL-MCNC: 7.6 G/DL (ref 6.8–8.8)
PSA SERPL-ACNC: 2.13 UG/L (ref 0–4)
RBC # BLD AUTO: 5.08 10E12/L (ref 4.4–5.9)
SODIUM SERPL-SCNC: 140 MMOL/L (ref 133–144)
TRIGL SERPL-MCNC: 86 MG/DL
WBC # BLD AUTO: 10.4 10E9/L (ref 4–11)

## 2020-09-08 PROCEDURE — G0103 PSA SCREENING: HCPCS | Performed by: FAMILY MEDICINE

## 2020-09-08 PROCEDURE — 80061 LIPID PANEL: CPT | Performed by: FAMILY MEDICINE

## 2020-09-08 PROCEDURE — 36415 COLL VENOUS BLD VENIPUNCTURE: CPT | Performed by: FAMILY MEDICINE

## 2020-09-08 PROCEDURE — 80053 COMPREHEN METABOLIC PANEL: CPT | Performed by: FAMILY MEDICINE

## 2020-09-08 PROCEDURE — 83036 HEMOGLOBIN GLYCOSYLATED A1C: CPT | Performed by: FAMILY MEDICINE

## 2020-09-08 PROCEDURE — 85025 COMPLETE CBC W/AUTO DIFF WBC: CPT | Performed by: FAMILY MEDICINE

## 2020-09-08 PROCEDURE — 99214 OFFICE O/P EST MOD 30 MIN: CPT | Performed by: FAMILY MEDICINE

## 2020-09-08 RX ORDER — HYDROCODONE BITARTRATE AND ACETAMINOPHEN 5; 325 MG/1; MG/1
1 TABLET ORAL EVERY 6 HOURS PRN
Qty: 60 TABLET | Refills: 0 | Status: SHIPPED | OUTPATIENT
Start: 2020-09-08 | End: 2020-10-28

## 2020-09-08 RX ORDER — PREGABALIN 150 MG/1
CAPSULE ORAL
Qty: 540 CAPSULE | Refills: 1 | Status: SHIPPED | OUTPATIENT
Start: 2020-09-08 | End: 2021-04-08

## 2020-09-08 RX ORDER — VARENICLINE TARTRATE 1 MG/1
TABLET, FILM COATED ORAL
Qty: 60 TABLET | Refills: 2 | Status: SHIPPED | OUTPATIENT
Start: 2020-09-08 | End: 2021-01-13

## 2020-09-08 NOTE — PATIENT INSTRUCTIONS
Monitor hematoma; should gradually resolve    Be seen promptly if symptoms acutely worsen     For sternal pain, try to taper down and off pain pill over next couple weeks    Go to once daily wellbutrin  For a week, then  Off    Start chantix once daily for a week, then two times daily    Call in 6 months to have  Us order CT chest

## 2020-09-08 NOTE — PROGRESS NOTES
Subjective     Alexey Reed is a 59 year old male who presents to clinic today for the following health issues:    HPI       About a month and a half ago he fractured his sternum and in his chest area there was a hematoma he states this area is getting bigger and he wants it checked out.  He would also like to discuss starting chantix to quit smoking       Review of Systems     Hiking at [employer]    Dog went after chipmunk    Down hill, hit rock      Dislocated finger, wound to arm,and  Fractured  Sternum    Found hematoma  There    No lifting  At work      Objective    /84 (BP Location: Right arm, Patient Position: Chair, Cuff Size: Adult Regular)   Pulse 93   Temp 98.4  F (36.9  C) (Oral)   Wt 88.7 kg (195 lb 8 oz)   BMI 28.05 kg/m    Body mass index is 28.05 kg/m .  Physical Exam  Constitutional:       Appearance: He is well-developed.   HENT:      Head: Normocephalic and atraumatic.   Eyes:      Conjunctiva/sclera: Conjunctivae normal.   Neck:      Vascular: No carotid bruit.   Cardiovascular:      Rate and Rhythm: Normal rate and regular rhythm.      Heart sounds: Normal heart sounds.   Pulmonary:      Effort: Pulmonary effort is normal. No respiratory distress.      Breath sounds: Normal breath sounds.   Chest:      Chest wall: Tenderness present.   Neurological:      Mental Status: He is alert and oriented to person, place, and time.      Cranial Nerves: No cranial nerve deficit.   Psychiatric:         Speech: Speech normal.         Behavior: Behavior normal.      patient has golf ball sized lump on chest wall left side of mid sternum; mildly soft  Some tenderness  Mid sterrnum      About  1 1/2 months since the fall         Ran out of pain pill last week    Was one every 6 hours       Well butrin didn't help, felt bad on it  Wants chantix instead         ASSESSMENT / PLAN:  (S22.20XA) Closed fracture of sternum, unspecified portion of sternum, initial encounter  (primary encounter  diagnosis)  Comment: refill med.  For the sternal pain, try to taper down and off med over next couple weeks.  He does typically get an annual prescription for vicodin related to his chronic facial pain.  Takes 2-3 pills per week usually.    Plan: HYDROcodone-acetaminophen (NORCO) 5-325 MG         tablet             (M79.2) Neuropathic pain syndrome (non-herpetic)  Comment: as above. Refill lyrica.  Refill vicodin but with new regulations will not give large quantity at once as has in past.   Plan: HYDROcodone-acetaminophen (NORCO) 5-325 MG         tablet, pregabalin (LYRICA) 150 MG capsule             (S20.219A) Hematoma of chest wall, unspecified laterality, initial encounter  Comment: discussed in detail.  Not a real large hematoma now.  The chest wall structures are keeping it contained. Not on blood thinners.  Okay to monitor for now.  Should very gradually resolve.  Follow up in clnic prn.  Be seen promptly if symptoms acutely worsen.   Plan: CBC with platelets differential              (Z72.0) Tobacco abuse  Comment: discussed in detail. Taper off wellbutrin as that not helping and go to chantix.  Warned of side effects.   Plan: varenicline (CHANTIX) 1 MG tablet             (R07.89) Sternal pain  Comment: see above.    Plan: HYDROcodone-acetaminophen (NORCO) 5-325 MG         tablet             (E78.5) Hyperlipidemia LDL goal <100  Comment: patient fasting, wants ;labs done   Plan: Lipid panel reflex to direct LDL Fasting,         Comprehensive metabolic panel             (R73.01) Impaired fasting glucose  Comment: check   Plan: Hemoglobin A1c             (Z12.5) Screening for prostate cancer  Comment: psa   Plan: Prostate spec antigen screen               I reviewed the patient's medications, allergies, medical history, family history, and social history.    David Morse MD

## 2020-09-11 NOTE — RESULT ENCOUNTER NOTE
"The hemoglobin a1c is barely in the \"prediabetes\" range.  Better than last year.  Keep working on healthy diet/exercise.    Other labs are okay/ stable.    David Morse MD  "

## 2020-09-15 DIAGNOSIS — R06.2 WHEEZING: ICD-10-CM

## 2020-09-15 DIAGNOSIS — R06.02 SOB (SHORTNESS OF BREATH): ICD-10-CM

## 2020-09-15 NOTE — TELEPHONE ENCOUNTER
[facility] faxed a refill request for    buPROPion (ZYBAN) 150 MG 12 hr tablet  DISCONTINUED        Last Written Prescription Date:  8/18/2020  Last Fill Quantity: 57 tablet,   # refills: 0  Last Office Visit: 8/18/2020 ewelina/ JESSA Ryan    Future Office visit:       Routing refill request to provider for review/approval because:  Drug not active on patient's medication list

## 2020-09-17 RX ORDER — ALBUTEROL SULFATE 90 UG/1
POWDER, METERED RESPIRATORY (INHALATION)
Qty: 1 INHALER | Refills: 3 | Status: SHIPPED | OUTPATIENT
Start: 2020-09-17 | End: 2021-05-27

## 2020-09-17 NOTE — TELEPHONE ENCOUNTER
"Requested Prescriptions   Pending Prescriptions Disp Refills    albuterol (PROAIR RESPICLICK) 108 (90 Base) MCG/ACT inhaler 1 Inhaler 3     Sig: INHALE 2 PUFFS INTO THE LUNGS EVERY 6 HOURS AS NEEDED FOR SHORTNESS OF BREATH OR DIFFICULT BREATHING OR WHEEZING       Asthma Maintenance Inhalers - Anticholinergics Passed - 9/15/2020  4:28 PM        Passed - Patient is age 12 years or older        Passed - Recent (12 mo) or future (30 days) visit within the authorizing provider's specialty     Patient has had an office visit with the authorizing provider or a provider within the authorizing providers department within the previous 12 mos or has a future within next 30 days. See \"Patient Info\" tab in inbasket, or \"Choose Columns\" in Meds & Orders section of the refill encounter.              Passed - Medication is active on med list       Short-Acting Beta Agonist Inhalers Protocol  Passed - 9/15/2020  4:28 PM        Passed - Patient is age 12 or older        Passed - Recent (12 mo) or future (30 days) visit within the authorizing provider's specialty     Patient has had an office visit with the authorizing provider or a provider within the authorizing providers department within the previous 12 mos or has a future within next 30 days. See \"Patient Info\" tab in inbasket, or \"Choose Columns\" in Meds & Orders section of the refill encounter.              Passed - Medication is active on med list           Routing refill request to provider for review/approval because:  Failed protocol.  Miracle Ferro RN,BSN  [facility]    "

## 2020-09-28 ENCOUNTER — MYC MEDICAL ADVICE (OUTPATIENT)
Dept: FAMILY MEDICINE | Facility: CLINIC | Age: 60
End: 2020-09-28

## 2020-09-28 DIAGNOSIS — S20.219A HEMATOMA OF CHEST WALL, UNSPECIFIED LATERALITY, INITIAL ENCOUNTER: Primary | ICD-10-CM

## 2020-09-28 NOTE — TELEPHONE ENCOUNTER
Patient should see our general surgeon for consult    I did order/ referral  Please give patient number to call and schedule    David Morse MD

## 2020-09-28 NOTE — TELEPHONE ENCOUNTER
Routing to PCP to review and advise.    Please see My Chart message.      Sahara Silverio RN  [facility]

## 2020-10-08 ENCOUNTER — OFFICE VISIT (OUTPATIENT)
Dept: SURGERY | Facility: CLINIC | Age: 60
End: 2020-10-08
Payer: COMMERCIAL

## 2020-10-08 VITALS
HEART RATE: 88 BPM | BODY MASS INDEX: 28.2 KG/M2 | WEIGHT: 197 LBS | HEIGHT: 70 IN | SYSTOLIC BLOOD PRESSURE: 155 MMHG | DIASTOLIC BLOOD PRESSURE: 90 MMHG

## 2020-10-08 DIAGNOSIS — S20.212S: Primary | ICD-10-CM

## 2020-10-08 PROCEDURE — 99214 OFFICE O/P EST MOD 30 MIN: CPT | Performed by: SURGERY

## 2020-10-08 ASSESSMENT — MIFFLIN-ST. JEOR: SCORE: 1714.84

## 2020-10-08 NOTE — LETTER
10/8/2020         RE: Alexey Reed  [address]  [facility] [address]        Dear Colleague,    Thank you for referring your patient, Alexey Reed, to the [facility]. Please see a copy of my visit note below.    I was asked to see Alexey Reed for enlarging chest wall mass by David Morse    HPI:  Patient is a 59 year old male with complaints of an enlarging mass on his chest. He hit his chest and cracked his sternum after falling in late July. In August a CT scan showed a 2.8 x 1.8 cm low-attenuation collection in the left anterior chest wall    probably representing an intramuscular hematoma within the pectoralis    musculature.  It also showed a sternal fracture and a lung nodule. He has been seen about that and is getting a repeat CT. HE is currently quitting smoking. He reports that the pain was improving but then he sneezed last week and this made it hurt quite a bit more. He still reports overall improvement. He reports that it is slowly getting bigger.     Review Of Systems    General: negative for fever or chills  Skin: negative for masses or rashes  Ears/Nose/Throat: negative for, epistaxis, bleeding gums  Respiratory: No shortness of breath, dyspnea on exertion, cough, or hemoptysis  Cardiovascular: negative for and chest pain  Gastrointestinal: negative for, nausea, vomiting and abdominal pain  Genitourinary: negative for and frequency  Neurologic: negative for focal weakness  Hematologic/Lymphatic/Immunologic: negative for, bleeding disorder and frequent infections  Endocrine: negative for, diabetes, polydipsia and polyuria      Past Medical History:   Diagnosis Date     Hyperlipaemia      Neuropathic pain syndrome (non-herpetic)      Unspecified essential hypertension        Past Surgical History:   Procedure Laterality Date     COLONOSCOPY       COLONOSCOPY WITH CO2 INSUFFLATION N/A 6/28/2019    Procedure: COLONOSCOPY, WITH CO2 INSUFFLATION;  Surgeon: Carlos Alberto  Armin CREWS DO;  Location:  OR     HEAD & NECK SURGERY       SINUS SURGERY       TONSILLECTOMY & ADENOIDECTOMY         Social History     Socioeconomic History     Marital status:      Spouse name: Not on file     Number of children: 4     Years of education: Not on file     Highest education level: Not on file   Occupational History     Occupation:      Comment: [employer]   Social Needs     Financial resource strain: Not on file     Food insecurity     Worry: Not on file     Inability: Not on file     Transportation needs     Medical: Not on file     Non-medical: Not on file   Tobacco Use     Smoking status: Current Every Day Smoker     Packs/day: 0.50     Years: 21.00     Pack years: 10.50     Types: Cigarettes     Smokeless tobacco: Never Used   Substance and Sexual Activity     Alcohol use: No     Drug use: No     Sexual activity: Yes     Partners: Female   Lifestyle     Physical activity     Days per week: Not on file     Minutes per session: Not on file     Stress: Not on file   Relationships     Social connections     Talks on phone: Not on file     Gets together: Not on file     Attends Orthodoxy service: Not on file     Active member of club or organization: Not on file     Attends meetings of clubs or organizations: Not on file     Relationship status: Not on file     Intimate partner violence     Fear of current or ex partner: Not on file     Emotionally abused: Not on file     Physically abused: Not on file     Forced sexual activity: Not on file   Other Topics Concern     Parent/sibling w/ CABG, MI or angioplasty before 65F 55M? No   Social History Narrative     Not on file       Current Outpatient Medications   Medication Sig Dispense Refill     albuterol (PROAIR RESPICLICK) 108 (90 Base) MCG/ACT inhaler INHALE 2 PUFFS INTO THE LUNGS EVERY 6 HOURS AS NEEDED FOR SHORTNESS OF BREATH OR DIFFICULT BREATHING OR WHEEZING 1 Inhaler 3     aspirin 81 MG EC tablet Take 81 mg by  "mouth daily       HYDROcodone-acetaminophen (NORCO) 5-325 MG tablet Take 1 tablet by mouth every 6 hours as needed for pain 60 tablet 0     losartan (COZAAR) 100 MG tablet Take 1 tablet (100 mg) by mouth daily 90 tablet 3     MULTI-VITAMIN OR TABS 1 TABLET DAILY       naproxen (NAPROSYN) 500 MG tablet Take 1 tablet (500 mg) by mouth 2 times daily as needed for moderate pain 60 tablet 0     pregabalin (LYRICA) 150 MG capsule TAKE 2 CAPSULE BY MOUTH THREE TIMES DAILY 540 capsule 1     sertraline (ZOLOFT) 100 MG tablet Take 1 tablet (100 mg) by mouth daily 90 tablet 3     simvastatin (ZOCOR) 40 MG tablet Take 1 tablet (40 mg) by mouth At Bedtime 90 tablet 3     varenicline (CHANTIX) 1 MG tablet 1 po daily for one week then 1 po bid  Thereafter 60 tablet 2       Medications and history reviewed    Physical exam:  Vitals: BP (!) 155/90   Pulse 88   Ht 1.778 m (5' 10\")   Wt 89.4 kg (197 lb)   BMI 28.27 kg/m    BMI= Body mass index is 28.27 kg/m .    Constitutional: healthy, alert and no distress  Eyes: Pupils round and equal, no icterus   ENT: Mucous membranes moist  Respiratory:  Non-labored respiration, chest wall mass 6 cm firm  Gastrointestinal: Abdomen soft, non-tender. BS normal. No masses, organomegaly  Musculoskeletal: No gross deformity  Neurologic: No gross focal deficits  Psychiatric: mentation appears normal and affect normal/bright  Hematologic/Lymphatic/Immunologic: No bruising noted  Skin: No lesions, rashes, erythema or jaundice noted    Assessment: Enlarging chest wall mass presumably a hematoma  Plan: CT chest wleftith contrast to evaluate the mass. Called [place] on call CT surgeon who agreed with the plan.     Armin Carcamo DO        Again, thank you for allowing me to participate in the care of your patient.        Sincerely,        Armin Carcamo, DO    "

## 2020-10-08 NOTE — PROGRESS NOTES
I was asked to see Alexey Reed for enlarging chest wall mass by David Morse    HPI:  Patient is a 59 year old male with complaints of an enlarging mass on his chest. He hit his chest and cracked his sternum after falling in late July. In August a CT scan showed a 2.8 x 1.8 cm low-attenuation collection in the left anterior chest wall    probably representing an intramuscular hematoma within the pectoralis    musculature.  It also showed a sternal fracture and a lung nodule. He has been seen about that and is getting a repeat CT. HE is currently quitting smoking. He reports that the pain was improving but then he sneezed last week and this made it hurt quite a bit more. He still reports overall improvement. He reports that it is slowly getting bigger.     Review Of Systems    General: negative for fever or chills  Skin: negative for masses or rashes  Ears/Nose/Throat: negative for, epistaxis, bleeding gums  Respiratory: No shortness of breath, dyspnea on exertion, cough, or hemoptysis  Cardiovascular: negative for and chest pain  Gastrointestinal: negative for, nausea, vomiting and abdominal pain  Genitourinary: negative for and frequency  Neurologic: negative for focal weakness  Hematologic/Lymphatic/Immunologic: negative for, bleeding disorder and frequent infections  Endocrine: negative for, diabetes, polydipsia and polyuria      Past Medical History:   Diagnosis Date     Hyperlipaemia      Neuropathic pain syndrome (non-herpetic)      Unspecified essential hypertension        Past Surgical History:   Procedure Laterality Date     COLONOSCOPY       COLONOSCOPY WITH CO2 INSUFFLATION N/A 6/28/2019    Procedure: COLONOSCOPY, WITH CO2 INSUFFLATION;  Surgeon: Armin Carcamo DO;  Location:  OR     HEAD & NECK SURGERY       SINUS SURGERY       TONSILLECTOMY & ADENOIDECTOMY         Social History     Socioeconomic History     Marital status:      Spouse name: Not on file     Number of children: 4      Years of education: Not on file     Highest education level: Not on file   Occupational History     Occupation:      Comment: [employer]   Social Needs     Financial resource strain: Not on file     Food insecurity     Worry: Not on file     Inability: Not on file     Transportation needs     Medical: Not on file     Non-medical: Not on file   Tobacco Use     Smoking status: Current Every Day Smoker     Packs/day: 0.50     Years: 21.00     Pack years: 10.50     Types: Cigarettes     Smokeless tobacco: Never Used   Substance and Sexual Activity     Alcohol use: No     Drug use: No     Sexual activity: Yes     Partners: Female   Lifestyle     Physical activity     Days per week: Not on file     Minutes per session: Not on file     Stress: Not on file   Relationships     Social connections     Talks on phone: Not on file     Gets together: Not on file     Attends Scientologist service: Not on file     Active member of club or organization: Not on file     Attends meetings of clubs or organizations: Not on file     Relationship status: Not on file     Intimate partner violence     Fear of current or ex partner: Not on file     Emotionally abused: Not on file     Physically abused: Not on file     Forced sexual activity: Not on file   Other Topics Concern     Parent/sibling w/ CABG, MI or angioplasty before 65F 55M? No   Social History Narrative     Not on file       Current Outpatient Medications   Medication Sig Dispense Refill     albuterol (PROAIR RESPICLICK) 108 (90 Base) MCG/ACT inhaler INHALE 2 PUFFS INTO THE LUNGS EVERY 6 HOURS AS NEEDED FOR SHORTNESS OF BREATH OR DIFFICULT BREATHING OR WHEEZING 1 Inhaler 3     aspirin 81 MG EC tablet Take 81 mg by mouth daily       HYDROcodone-acetaminophen (NORCO) 5-325 MG tablet Take 1 tablet by mouth every 6 hours as needed for pain 60 tablet 0     losartan (COZAAR) 100 MG tablet Take 1 tablet (100 mg) by mouth daily 90 tablet 3     MULTI-VITAMIN OR TABS 1  "TABLET DAILY       naproxen (NAPROSYN) 500 MG tablet Take 1 tablet (500 mg) by mouth 2 times daily as needed for moderate pain 60 tablet 0     pregabalin (LYRICA) 150 MG capsule TAKE 2 CAPSULE BY MOUTH THREE TIMES DAILY 540 capsule 1     sertraline (ZOLOFT) 100 MG tablet Take 1 tablet (100 mg) by mouth daily 90 tablet 3     simvastatin (ZOCOR) 40 MG tablet Take 1 tablet (40 mg) by mouth At Bedtime 90 tablet 3     varenicline (CHANTIX) 1 MG tablet 1 po daily for one week then 1 po bid  Thereafter 60 tablet 2       Medications and history reviewed    Physical exam:  Vitals: BP (!) 155/90   Pulse 88   Ht 1.778 m (5' 10\")   Wt 89.4 kg (197 lb)   BMI 28.27 kg/m    BMI= Body mass index is 28.27 kg/m .    Constitutional: healthy, alert and no distress  Eyes: Pupils round and equal, no icterus   ENT: Mucous membranes moist  Respiratory:  Non-labored respiration, chest wall mass 6 cm firm  Gastrointestinal: Abdomen soft, non-tender. BS normal. No masses, organomegaly  Musculoskeletal: No gross deformity  Neurologic: No gross focal deficits  Psychiatric: mentation appears normal and affect normal/bright  Hematologic/Lymphatic/Immunologic: No bruising noted  Skin: No lesions, rashes, erythema or jaundice noted    Assessment: Enlarging chest wall mass presumably a hematoma  Plan: CT chest wleftith contrast to evaluate the mass. Called [place] on call CT surgeon who agreed with the plan.     Armin Carcamo, DO    "

## 2020-10-13 ENCOUNTER — VIRTUAL VISIT (OUTPATIENT)
Dept: FAMILY MEDICINE | Facility: OTHER | Age: 60
End: 2020-10-13
Payer: COMMERCIAL

## 2020-10-13 PROCEDURE — 99421 OL DIG E/M SVC 5-10 MIN: CPT | Performed by: FAMILY MEDICINE

## 2020-10-13 NOTE — PROGRESS NOTES
"Date: 10/13/2020 12:13:02  Clinician: Kun Menjivar  Clinician NPI: [id number]  Patient: Alexey Reed  Patient : 1960  Patient Address: [address] [place] [address], [place] [address]  Patient Phone: [phone]  Visit Protocol: URI  Patient Summary:  Alexey is a 59 year old ( : 1960 ) male who initiated a [facility] Visit for COVID-19 (Coronavirus) evaluation and screening. When asked the question \"Please sign me up to receive news, health information and promotions from [facility].\", Alexey responded \"No\".    When asked when his symptoms started, Alexey reported that he does not have any symptoms.   He denies taking antibiotic medication in the past month and having recent facial or sinus surgery in the past 60 days.    Pertinent COVID-19 (Coronavirus) information  In the past 14 days, Alexey has not worked in a congregate living setting.   He does not work or volunteer as healthcare worker or a  and does not work or volunteer in a healthcare facility.   Alexey also has not lived in a congregate living setting in the past 14 days. He does not live with a healthcare worker.   Alexey has had a close contact with a laboratory-confirmed COVID-19 patient in the last 14 days. Additional information about contact with COVID-19 (Coronavirus) patient as reported by the patient (free text): Was exposed to a lab-positive supervisor at work Monday 10/13/2020.  Also, 3 other lab-confirmed cases within my working proximity.   Patient reported they are not living in the same household with a COVID-19 positive patient.  Patient was in an enclosed space for greater than 15 minutes with a COVID-19 patient.  Since 2019, Alexey and has not had upper respiratory infection or influenza-like illness. Has not been diagnosed with lab-confirmed COVID-19 test   Pertinent medical history  Alexey does not need a return to work/school note.   Weight: 194 lbs   Alexey smokes or uses smokeless tobacco.   Additional " information as reported by the patient (free text): I am recovering from a fractured sternum.   Weight: 194 lbs    MEDICATIONS: naproxen oral, Chantix Starting Month Box oral, simvastatin oral, losartan oral, sertraline oral, Lyrica oral, ALLERGIES: NKDA  Clinician Response:  Dear Alexey,   Based on your exposure to COVID-19 (coronavirus), we would like to test you for this virus.  1. Please call [phone] to schedule your visit. Explain that you were referred by [facility] to have a COVID-19 test. Be ready to [facility].  The following will serve as your written order for this COVID Test, ordered by me, for the indication of suspected COVID [Z20.828]: The test will be ordered in AppSheet, our electronic health record, after you are scheduled. It will show as ordered and authorized by David Cotton MD.  Order: COVID-19 (coronavirus) PCR for ASYMPTOMATIC EXPOSURE testing from [facility].  If you know you have had close contact with someone who tested positive, you should be quarantined for 14 days after this exposure. You should stay in quarantine for the14 days even if the covid test is negative, the optimal time to test after exposure is 5-7 days from the exposure  Quarantine means   What should I do?  For safety, it's very important to follow these rules. Do this for 14 days after the date you were last exposed to the virus..  Stay home and away from others. Don't go to school or anywhere else. Generally quarantine means staying home from work but there are some exceptions to this. Please contact your workplace.   No hugging, kissing or shaking hands.  Don't let anyone visit.  Cover your mouth and nose with a mask, tissue or washcloth to avoid spreading germs.  Wash your hands and face often. Use soap and water.  What are the symptoms of COVID-19?  The most common symptoms are cough, fever and trouble breathing. Less common symptoms include headache, body aches, fatigue (feeling very tired), chills,  sore throat, stuffy or runny nose, diarrhea (loose poop), loss of taste or smell, belly pain, and nausea or vomiting (feeling sick to your stomach or throwing up).  After 14 days, if you have still don't have symptoms, you likely don't have this virus.  If you develop symptoms, follow these guidelines.  If you're normally healthy: Please start another [facility] visit to report your symptoms. Go to [email or web address].  If you have a serious health problem (like cancer, heart failure, an organ transplant or kidney disease): Call your specialty clinic. Let them know that you might have COVID-19.  2. When it's time for your COVID test:  Stay at least 6 feet away from others. (If someone will drive you to your test, stay in the backseat, as far away from the  as you can.)  Cover your mouth and nose with a mask, tissue or washcloth.  Go straight to the testing site. Don't make any stops on the way there or back.  Please note  Caregivers in these groups are at risk for severe illness due to COVID-19:  o People 65 years and older  o People who live in a nursing home or long-term care facility  o People with chronic disease (lung, heart, cancer, diabetes, kidney, liver, immunologic)  o People who have a weakened immune system, including those who:  Are in cancer treatment  Take medicine that weakens the immune system, such as corticosteroids  Had a bone marrow or organ transplant  Have an immune deficiency  Have poorly controlled HIV or AIDS  Are obese (body mass index of 40 or higher)  Smoke regularly  Where can I get more information?  [facility] -- About COVID-19: [email or web address]  CDC -- What to Do If You're Sick: www.cdc.gov/coronavirus/2019-ncov/about/steps-when-sick.html  CDC -- Ending Home Isolation: www.cdc.gov/coronavirus/2019-ncov/hcp/disposition-in-home-patients.html  CDC -- Caring for Someone: www.cdc.gov/coronavirus/2019-ncov/if-you-are-sick/care-for-someone.html  [facility] -- Interim Guidance for  Hospital Discharge to Home: www.health.[facility].[place]/diseases/coronavirus/hcp/hospdischarge.pdf  [facility] clinical trials (COVID-19 research studies): clinicalaffairs.[facility]/n-clinical-trials  Below are the COVID-19 hotlines at the [place] Department of Health ([facility]). Interpreters are available.  For health questions: Call 750-535-4976 or 1-216.248.4239 (7 a.m. to 7 p.m.)  For questions about schools and childcare: Call 918-753-5675 or 1-484.875.5072 (7 a.m. to 7 p.m.)    Diagnosis: Contact with and (suspected) exposure to other viral communicable diseases  Diagnosis ICD: Z20.828

## 2020-10-15 DIAGNOSIS — Z20.822 SUSPECTED 2019 NOVEL CORONAVIRUS INFECTION: Primary | ICD-10-CM

## 2020-10-16 DIAGNOSIS — Z20.822 SUSPECTED 2019 NOVEL CORONAVIRUS INFECTION: ICD-10-CM

## 2020-10-16 PROCEDURE — U0003 INFECTIOUS AGENT DETECTION BY NUCLEIC ACID (DNA OR RNA); SEVERE ACUTE RESPIRATORY SYNDROME CORONAVIRUS 2 (SARS-COV-2) (CORONAVIRUS DISEASE [COVID-19]), AMPLIFIED PROBE TECHNIQUE, MAKING USE OF HIGH THROUGHPUT TECHNOLOGIES AS DESCRIBED BY CMS-2020-01-R: HCPCS | Performed by: FAMILY MEDICINE

## 2020-10-17 LAB
SARS-COV-2 RNA SPEC QL NAA+PROBE: NOT DETECTED
SPECIMEN SOURCE: NORMAL

## 2020-10-19 ENCOUNTER — ANCILLARY PROCEDURE (OUTPATIENT)
Dept: CT IMAGING | Facility: CLINIC | Age: 60
End: 2020-10-19
Attending: SURGERY
Payer: COMMERCIAL

## 2020-10-19 DIAGNOSIS — S20.212S: ICD-10-CM

## 2020-10-19 PROCEDURE — 71260 CT THORAX DX C+: CPT | Mod: TC

## 2020-10-19 RX ORDER — IOPAMIDOL 755 MG/ML
500 INJECTION, SOLUTION INTRAVASCULAR ONCE
Status: COMPLETED | OUTPATIENT
Start: 2020-10-19 | End: 2020-10-19

## 2020-10-19 RX ADMIN — IOPAMIDOL 80 ML: 755 INJECTION, SOLUTION INTRAVASCULAR at 15:56

## 2020-10-19 RX ADMIN — Medication 70 ML: at 15:56

## 2020-10-20 ENCOUNTER — TELEPHONE (OUTPATIENT)
Dept: FAMILY MEDICINE | Facility: CLINIC | Age: 60
End: 2020-10-20

## 2020-10-20 NOTE — TELEPHONE ENCOUNTER
Prior Authorization Retail Medication Request    Medication/Dose: Pregabalin  ICD code (if different than what is on RX):    Previously Tried and Failed:    Rationale:      Insurance Name:  [employer]  Insurance ID:  [id number]      Pharmacy Information (if different than what is on RX)  Name:  [facility]  Phone:  [phone]

## 2020-10-20 NOTE — TELEPHONE ENCOUNTER
Central Prior Authorization Team   Phone: [phone]    PA Initiation    Medication: Pregabalin  Insurance Company: [employer] - Phone [phone] Fax [phone]  Pharmacy Filling the Rx: [id number] - [place], [place] - [address] [place] AT [facility]  [address]  Filling Pharmacy Phone: [phone]  Filling Pharmacy Fax: [phone]  Start Date: 10/20/2020

## 2020-10-21 DIAGNOSIS — S20.219D HEMATOMA OF CHEST WALL, UNSPECIFIED LATERALITY, SUBSEQUENT ENCOUNTER: Primary | ICD-10-CM

## 2020-10-26 NOTE — TELEPHONE ENCOUNTER
ONCOLOGY INTAKE: Records Information      APPT INFORMATION:  Referring provider:  Armin Carcamo DO  Referring provider s clinic:  [facility]  Reason for visit/diagnosis:  Hematoma of chest wall, unspecified laterality, subsequent encounter   Has patient been notified of appointment date and time?: Yes    RECORDS INFORMATION:  Were the records received with the referral (via [employer])? No,Internal Referral      Has patient been seen for any external appt for this diagnosis? No    If yes, where? NA    ADDITIONAL INFORMATION:  None

## 2020-10-28 ENCOUNTER — VIRTUAL VISIT (OUTPATIENT)
Dept: SURGERY | Facility: CLINIC | Age: 60
End: 2020-10-28
Attending: SURGERY
Payer: COMMERCIAL

## 2020-10-28 ENCOUNTER — PRE VISIT (OUTPATIENT)
Dept: SURGERY | Facility: CLINIC | Age: 60
End: 2020-10-28

## 2020-10-28 DIAGNOSIS — S20.219D HEMATOMA OF CHEST WALL, UNSPECIFIED LATERALITY, SUBSEQUENT ENCOUNTER: ICD-10-CM

## 2020-10-28 PROCEDURE — 999N001193 HC VIDEO/TELEPHONE VISIT; NO CHARGE

## 2020-10-28 PROCEDURE — 99204 OFFICE O/P NEW MOD 45 MIN: CPT | Mod: GT | Performed by: THORACIC SURGERY (CARDIOTHORACIC VASCULAR SURGERY)

## 2020-10-28 NOTE — LETTER
"    10/28/2020         RE: Alexey Reed  [address]  [facility] [address]        Dear Colleague,    Thank you for referring your patient, Alexey Reed, to the [facility]. Please see a copy of my visit note below.    Alexey Reed is a 59 year old male who is being evaluated via a billable video visit.      The patient has been notified of following:     \"This video visit will be conducted via a call between you and your physician/provider. We have found that certain health care needs can be provided without the need for an in-person physical exam.  This service lets us provide the care you need with a video conversation.  If a prescription is necessary we can send it directly to your pharmacy.  If lab work is needed we can place an order for that and you can then stop by our lab to have the test done at a later time.    Video visits are billed at different rates depending on your insurance coverage.  Please reach out to your insurance provider with any questions.    If during the course of the call the physician/provider feels a video visit is not appropriate, you will not be charged for this service.\"    Patient has given verbal consent for Video visit? Yes  How would you like to obtain your AVS? MyChart  If you are dropped from the video visit, the video invite should be resent to: Send to e-mail at: [email or web address]  Will anyone else be joining your video visit? No      Vitals - Patient Reported  Weight (Patient Reported): 88 kg (194 lb)  Height (Patient Reported): 177.8 cm (5' 10\")  BMI (Based on Pt Reported Ht/Wt): 27.84  Pain Score: No Pain (0)    Apolonia Santizo CMA October 28, 2020  3:49 PM     THORACIC SURGERY - NEW PATIENT OFFICE VISIT      Dear Dr. Carcamo,    I saw Mr. Reed in consultation for the evaluation and treatment of sternal fracture, anterior chest wall hematoma and bilateral pulmonary nodules.    HPI  Mr. Alexey Reed is a 59 year old male patient " who presented with an anterior chest wall palpable mass after having a fall from a hill in late July. He sustained a sternal body fracture and left anterolateral 3rd and 4th ribs. He had a CT scan in 10/19/20 showing a heterogeneous fluid collection that increased in size from August.     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 He has mild chest discomfort since the seatbelt lays on this mass. No Fever or chills. No drainage from the area. No involuntary weight loss.     Previsit Tests   CT scan 10/19/20: 4.7 cm fluid collection adjacent to sternal fracture, which shows some bone loss. Subcentimeter bilateral pulmonary nodules.             PMH    Past Medical History:   Diagnosis Date     Hyperlipaemia      Neuropathic pain syndrome (non-herpetic)      Unspecified essential hypertension         PSH    Past Surgical History:   Procedure Laterality Date     COLONOSCOPY       COLONOSCOPY WITH CO2 INSUFFLATION N/A 6/28/2019    Procedure: COLONOSCOPY, WITH CO2 INSUFFLATION;  Surgeon: Armin Carcamo DO;  Location:  OR     HEAD & NECK SURGERY       SINUS SURGERY       TONSILLECTOMY & ADENOIDECTOMY           Allergies   Allergen Reactions     Ace Inhibitors Cough     Current Outpatient Medications   Medication     albuterol (PROAIR RESPICLICK) 108 (90 Base) MCG/ACT inhaler     aspirin 81 MG EC tablet     losartan (COZAAR) 100 MG tablet     MULTI-VITAMIN OR TABS     naproxen (NAPROSYN) 500 MG tablet     pregabalin (LYRICA) 150 MG capsule     sertraline (ZOLOFT) 100 MG tablet     simvastatin (ZOCOR) 40 MG tablet     varenicline (CHANTIX) 1 MG tablet     No current facility-administered medications for this visit.        ETOH None   TOB Current smoker    Physical examination  BMI 28    From a personal perspective, he lives in [facility]. He is a drummer.     IMPRESSION (S20.219D) Hematoma of chest wall, unspecified laterality, subsequent encounter     59 year old male  patient with anterior chest wall hematoma. I discussed at length with Delgado that the risk of malignancy is very low given the imaging findings and associated trauma. The interval increase in size may have been related to repetitive motion of the upper extremities. He does have some bone loss of the sternal fracture, however there is no clicking and clinically he is doing well.     PLAN   I reviewed the plan as follows:  Sternal fracture: Conservative management. No need for plating at this point, unless non-union or malunion develops.   Chest wall hematoma: Recommend percutaneous drainage.   Bilateral pulmonary nodules: Continued surveillance with interval CT scan per PCP.   Smoking Cessation: Mr. Reed was counseled on the importance of smoking cessation and its importance for his overall health.   I appreciate the opportunity to participate in the care of your patient and will keep you updated.  Sincerely,    Sheila Sellers MD      Video-Visit Details    Type of service:  Video Visit    Video Start Time: 4:26  Video End Time: 4:38    Originating Location (pt. Location): Home    Distant Location (provider location):  [facility]     Platform used for Video Visit: Sally Sellers MD                Again, thank you for allowing me to participate in the care of your patient.        Sincerely,        Robb Valentine MD

## 2020-10-28 NOTE — PROGRESS NOTES
"Alexey Reed is a 59 year old male who is being evaluated via a billable video visit.      The patient has been notified of following:     \"This video visit will be conducted via a call between you and your physician/provider. We have found that certain health care needs can be provided without the need for an in-person physical exam.  This service lets us provide the care you need with a video conversation.  If a prescription is necessary we can send it directly to your pharmacy.  If lab work is needed we can place an order for that and you can then stop by our lab to have the test done at a later time.    Video visits are billed at different rates depending on your insurance coverage.  Please reach out to your insurance provider with any questions.    If during the course of the call the physician/provider feels a video visit is not appropriate, you will not be charged for this service.\"    Patient has given verbal consent for Video visit? Yes  How would you like to obtain your AVS? MyChart  If you are dropped from the video visit, the video invite should be resent to: Send to e-mail at: [email or web address]  Will anyone else be joining your video visit? No      Vitals - Patient Reported  Weight (Patient Reported): 88 kg (194 lb)  Height (Patient Reported): 177.8 cm (5' 10\")  BMI (Based on Pt Reported Ht/Wt): 27.84  Pain Score: No Pain (0)    Apolonia Santizo CMA October 28, 2020  3:49 PM     THORACIC SURGERY - NEW PATIENT OFFICE VISIT      Dear Dr. Carcamo,    I saw Mr. Reed in consultation for the evaluation and treatment of sternal fracture, anterior chest wall hematoma and bilateral pulmonary nodules.    HPI  Mr. Alexey Reed is a 59 year old male patient who presented with an anterior chest wall palpable mass after having a fall from a hill in late July. He sustained a sternal body fracture and left anterolateral 3rd and 4th ribs. He had a CT scan in 10/19/20 showing a heterogeneous fluid collection that " increased in size from August.     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 He has mild chest discomfort since the seatbelt lays on this mass. No Fever or chills. No drainage from the area. No involuntary weight loss.     Previsit Tests   CT scan 10/19/20: 4.7 cm fluid collection adjacent to sternal fracture, which shows some bone loss. Subcentimeter bilateral pulmonary nodules.             PMH    Past Medical History:   Diagnosis Date     Hyperlipaemia      Neuropathic pain syndrome (non-herpetic)      Unspecified essential hypertension         PSH    Past Surgical History:   Procedure Laterality Date     COLONOSCOPY       COLONOSCOPY WITH CO2 INSUFFLATION N/A 6/28/2019    Procedure: COLONOSCOPY, WITH CO2 INSUFFLATION;  Surgeon: Armin Carcamo DO;  Location:  OR     HEAD & NECK SURGERY       SINUS SURGERY       TONSILLECTOMY & ADENOIDECTOMY           Allergies   Allergen Reactions     Ace Inhibitors Cough     Current Outpatient Medications   Medication     albuterol (PROAIR RESPICLICK) 108 (90 Base) MCG/ACT inhaler     aspirin 81 MG EC tablet     losartan (COZAAR) 100 MG tablet     MULTI-VITAMIN OR TABS     naproxen (NAPROSYN) 500 MG tablet     pregabalin (LYRICA) 150 MG capsule     sertraline (ZOLOFT) 100 MG tablet     simvastatin (ZOCOR) 40 MG tablet     varenicline (CHANTIX) 1 MG tablet     No current facility-administered medications for this visit.        ETOH None   TOB Current smoker    Physical examination  BMI 28    From a personal perspective, he lives in [facility]. He is a drummer.     IMPRESSION (S20.896Y) Hematoma of chest wall, unspecified laterality, subsequent encounter     59 year old male patient with anterior chest wall hematoma. I discussed at length with Delgado that the risk of malignancy is very low given the imaging findings and associated trauma. The interval increase in size may have been related to repetitive motion of the upper  extremities. He does have some bone loss of the sternal fracture, however there is no clicking and clinically he is doing well.     PLAN   I reviewed the plan as follows:  Sternal fracture: Conservative management. No need for plating at this point, unless non-union or malunion develops.   Chest wall hematoma: Recommend percutaneous drainage.   Bilateral pulmonary nodules: Continued surveillance with interval CT scan per PCP.   Smoking Cessation: Mr. Reed was counseled on the importance of smoking cessation and its importance for his overall health.   I appreciate the opportunity to participate in the care of your patient and will keep you updated.  Sincerely,    Sheila Sellers MD      Video-Visit Details    Type of service:  Video Visit    Video Start Time: 4:26  Video End Time: 4:38    Originating Location (pt. Location): Home    Distant Location (provider location):  [facility]     Platform used for Video Visit: Sally Sellers MD

## 2020-11-03 ENCOUNTER — DOCUMENTATION ONLY (OUTPATIENT)
Dept: INTERVENTIONAL RADIOLOGY/VASCULAR | Facility: CLINIC | Age: 60
End: 2020-11-03

## 2020-11-03 ENCOUNTER — PREP FOR PROCEDURE (OUTPATIENT)
Dept: SURGERY | Facility: CLINIC | Age: 60
End: 2020-11-03

## 2020-11-03 DIAGNOSIS — S20.219D HEMATOMA OF CHEST WALL, UNSPECIFIED LATERALITY, SUBSEQUENT ENCOUNTER: Primary | ICD-10-CM

## 2020-11-03 NOTE — PROGRESS NOTES
Patient referred to interventional radiology for left anterior chest wall hematoma aspiration.  Sent by Dr. Serge Zamudio and CNS Dottie Carrion.    Chest CT dated 10/19/2020 shows a 4.7 cm heterogeneous area in the anterior left chest wall just left of midline which is increased in size when compared to 8/16/2020 exam when it measured 3.9 cm.    Patient sustained a traumatic fall last July with sternal fracture.  Chart note shows he continues to feel a palpable and uncomfortable lump in that area.    Request for aspiration, sending for cytology to rule out malignancy.  Imaging reviewed with IR Dr. Camilo Mckeon.  Approved for IR attempt at aspiration.    Referring provider will need to enter specimen lab orders.  Patient will also need to have COVID-19 screening within 4 days of procedure date.    Procedure order already entered by referring.    RYAN Booth, PAHermilaC

## 2020-11-04 DIAGNOSIS — S20.219D HEMATOMA OF CHEST WALL, UNSPECIFIED LATERALITY, SUBSEQUENT ENCOUNTER: Primary | ICD-10-CM

## 2020-11-05 DIAGNOSIS — Z11.59 ENCOUNTER FOR SCREENING FOR OTHER VIRAL DISEASES: Primary | ICD-10-CM

## 2020-11-05 DIAGNOSIS — S20.212S: Primary | ICD-10-CM

## 2020-11-07 DIAGNOSIS — Z11.59 ENCOUNTER FOR SCREENING FOR OTHER VIRAL DISEASES: ICD-10-CM

## 2020-11-07 PROCEDURE — U0003 INFECTIOUS AGENT DETECTION BY NUCLEIC ACID (DNA OR RNA); SEVERE ACUTE RESPIRATORY SYNDROME CORONAVIRUS 2 (SARS-COV-2) (CORONAVIRUS DISEASE [COVID-19]), AMPLIFIED PROBE TECHNIQUE, MAKING USE OF HIGH THROUGHPUT TECHNOLOGIES AS DESCRIBED BY CMS-2020-01-R: HCPCS | Performed by: THORACIC SURGERY (CARDIOTHORACIC VASCULAR SURGERY)

## 2020-11-08 LAB
LABORATORY COMMENT REPORT: NORMAL
SARS-COV-2 RNA SPEC QL NAA+PROBE: NEGATIVE
SARS-COV-2 RNA SPEC QL NAA+PROBE: NORMAL
SPECIMEN SOURCE: NORMAL
SPECIMEN SOURCE: NORMAL

## 2020-11-10 ENCOUNTER — TELEPHONE (OUTPATIENT)
Dept: INTERVENTIONAL RADIOLOGY/VASCULAR | Facility: CLINIC | Age: 60
End: 2020-11-10

## 2020-11-11 ENCOUNTER — APPOINTMENT (OUTPATIENT)
Dept: INTERVENTIONAL RADIOLOGY/VASCULAR | Facility: CLINIC | Age: 60
End: 2020-11-11
Attending: PHYSICIAN ASSISTANT
Payer: COMMERCIAL

## 2020-11-11 ENCOUNTER — HOSPITAL ENCOUNTER (OUTPATIENT)
Facility: CLINIC | Age: 60
Discharge: HOME | End: 2020-11-11
Attending: THORACIC SURGERY (CARDIOTHORACIC VASCULAR SURGERY) | Admitting: PHYSICIAN ASSISTANT
Payer: COMMERCIAL

## 2020-11-11 VITALS — SYSTOLIC BLOOD PRESSURE: 138 MMHG | OXYGEN SATURATION: 96 % | HEART RATE: 71 BPM | DIASTOLIC BLOOD PRESSURE: 80 MMHG

## 2020-11-11 DIAGNOSIS — S20.212S: ICD-10-CM

## 2020-11-11 DIAGNOSIS — S20.219D HEMATOMA OF CHEST WALL, UNSPECIFIED LATERALITY, SUBSEQUENT ENCOUNTER: ICD-10-CM

## 2020-11-11 PROCEDURE — 88305 TISSUE EXAM BY PATHOLOGIST: CPT | Mod: 26 | Performed by: PATHOLOGY

## 2020-11-11 PROCEDURE — 999N001014 HC STATISTIC CYTO WRIGHT STAIN TC: Performed by: CLINICAL NURSE SPECIALIST

## 2020-11-11 PROCEDURE — 88112 CYTOPATH CELL ENHANCE TECH: CPT | Mod: TC | Performed by: CLINICAL NURSE SPECIALIST

## 2020-11-11 PROCEDURE — 88305 TISSUE EXAM BY PATHOLOGIST: CPT | Mod: TC | Performed by: CLINICAL NURSE SPECIALIST

## 2020-11-11 PROCEDURE — 88112 CYTOPATH CELL ENHANCE TECH: CPT | Mod: 26 | Performed by: PATHOLOGY

## 2020-11-11 PROCEDURE — 999N001018 HC STATISTIC H-CELL BLOCK W/STAIN: Performed by: CLINICAL NURSE SPECIALIST

## 2020-11-11 PROCEDURE — 76942 ECHO GUIDE FOR BIOPSY: CPT

## 2020-11-11 PROCEDURE — 10160 PNXR ASPIR ABSC HMTMA BULLA: CPT

## 2020-11-11 PROCEDURE — 272N000502 HC NEEDLE CR3

## 2020-11-11 PROCEDURE — 10005 FNA BX W/US GDN 1ST LES: CPT | Performed by: PHYSICIAN ASSISTANT

## 2020-11-11 NOTE — PROCEDURES
Alexey CHAUDHRY [facility]  [id number]    Completed ultrasound guided LEFT chest wall fluid collection aspiration and core biopsy attempt. No core sample obtained. A total of 8 mL dark red fluid aspirated and sent for requested cytology. No immediate complication. Dx: Chest wall fluid collection. Catracho

## 2020-11-11 NOTE — PROGRESS NOTES
Patient Name: Alexey Reed  Medical Record Number: [id number]  Today's Date: 11/11/2020    Procedure: Chest wall aspiration  Proceduralist: Carmelo Obregon PA-C    Procedure Start: 0900  Procedure end: 0920  Sedation medications administered: none     Report given to: none, discharge to [facility]    Other Notes: Pt arrived to IR room 7 from [facility]. Consent reviewed. Pt denies any questions or concerns regarding procedure. Pt positioned supine and monitored per protocol. Pt tolerated procedure without any noted complications. Pt transferred back to [facility].

## 2020-11-12 LAB — COPATH REPORT: NORMAL

## 2020-11-16 ENCOUNTER — HEALTH MAINTENANCE LETTER (OUTPATIENT)
Age: 60
End: 2020-11-16

## 2020-11-18 ENCOUNTER — VIRTUAL VISIT (OUTPATIENT)
Dept: SURGERY | Facility: CLINIC | Age: 60
End: 2020-11-18
Attending: THORACIC SURGERY (CARDIOTHORACIC VASCULAR SURGERY)
Payer: COMMERCIAL

## 2020-11-18 DIAGNOSIS — S20.219D HEMATOMA OF CHEST WALL, UNSPECIFIED LATERALITY, SUBSEQUENT ENCOUNTER: Primary | ICD-10-CM

## 2020-11-18 DIAGNOSIS — S22.20XG CLOSED FRACTURE OF STERNUM WITH DELAYED HEALING, UNSPECIFIED PORTION OF STERNUM, SUBSEQUENT ENCOUNTER: ICD-10-CM

## 2020-11-18 PROCEDURE — 99213 OFFICE O/P EST LOW 20 MIN: CPT | Mod: 95 | Performed by: THORACIC SURGERY (CARDIOTHORACIC VASCULAR SURGERY)

## 2020-11-18 PROCEDURE — 999N001193 HC VIDEO/TELEPHONE VISIT; NO CHARGE

## 2020-11-18 NOTE — PROGRESS NOTES
"Alexey Reed is a 60 year old male who is being evaluated via a billable video visit.      The patient has been notified of following:     \"This video visit will be conducted via a call between you and your physician/provider. We have found that certain health care needs can be provided without the need for an in-person physical exam.  This service lets us provide the care you need with a video conversation.  If a prescription is necessary we can send it directly to your pharmacy.  If lab work is needed we can place an order for that and you can then stop by our lab to have the test done at a later time.    Video visits are billed at different rates depending on your insurance coverage.  Please reach out to your insurance provider with any questions.    If during the course of the call the physician/provider feels a video visit is not appropriate, you will not be charged for this service.\"    Patient has given verbal consent for Video visit? Yes  How would you like to obtain your AVS? MyChart  If you are dropped from the video visit, the video invite should be resent to: Text to cell phone: [phone]  Will anyone else be joining your video visit? No    Vitals - Patient Reported  Weight (Patient Reported): 88.9 kg (196 lb)  Pain Score: No Pain (0)        I have reviewed and updated patient's allergy and medication list.    Concerns: None  Refills: None      Luzma Long [facility]    THORACIC SURGERY FOLLOW UP VISIT    I saw Mr. Alexey Reed in follow-up today. The clinical summary follows:      DIAGNOSIS   Traumatic sternal fracture  Anterior chest wall hematoma   Bilateral pulmonary nodules  PROCEDURE   11/11/20 IR guided chest wall hematoma aspiration.     COMPLICATIONS  None    INTERVAL STUDIES  11/11/20 Chest wall hematoma cytology: Negative for malignancy.     ETOH None   TOB None  BMI 28     Exam: Deferred (virtual visit)    SUBJECTIVE  Mr Reed is doing much better. The hematoma is much smaller since " aspiration and has not gotten any bigger. The seatbelt is not bothering any more. He denies any sternal clicking.     From a personal perspective, he is at home with his family.     IMPRESSION (S20.219D) Hematoma of chest wall, unspecified laterality, subsequent encounter  (primary encounter diagnosis)  (S22.20XG) Closed fracture of sternum with delayed healing, unspecified portion of sternum, subsequent encounter    60 year old male patient with traumatic sternal fracture, chest wall hematoma and small bilateral pulmonary nodules. I discussed with Mr Reed that his sternal fracture appears to be healing without problems. The hematoma has almost resolved and cytology was negative for malignancy. In terms of the pulmonary nodules, he should continue to be followed by his PCP.     PLAN   I reviewed the plan as follows:  No need for ORIF of sternal fracture. Conservative management of chest wall hematoma.   Continued follow up of bilateral pulmonary nodules by PCP.   All questions were answered and the patient and present family were in agreement with the plan.  I appreciate the opportunity to participate in the care of your patient and will keep you updated.  Sincerely,      Video-Visit Details    Type of service:  Video Visit    Video Start Time: 3:35  Video End Time: 3:42    Originating Location (pt. Location): Home    Distant Location (provider location):  [facility]     Platform used for Video Visit: Sally Zamudio MD

## 2020-11-18 NOTE — LETTER
"    11/18/2020         RE: Alexey Reed  [address]  [facility] [address]        Dear Colleague,    Thank you for referring your patient, Alexey Reed, to the [facility]. Please see a copy of my visit note below.    Alexey Reed is a 60 year old male who is being evaluated via a billable video visit.      The patient has been notified of following:     \"This video visit will be conducted via a call between you and your physician/provider. We have found that certain health care needs can be provided without the need for an in-person physical exam.  This service lets us provide the care you need with a video conversation.  If a prescription is necessary we can send it directly to your pharmacy.  If lab work is needed we can place an order for that and you can then stop by our lab to have the test done at a later time.    Video visits are billed at different rates depending on your insurance coverage.  Please reach out to your insurance provider with any questions.    If during the course of the call the physician/provider feels a video visit is not appropriate, you will not be charged for this service.\"    Patient has given verbal consent for Video visit? Yes  How would you like to obtain your AVS? MyChart  If you are dropped from the video visit, the video invite should be resent to: Text to cell phone: [phone]  Will anyone else be joining your video visit? No    Vitals - Patient Reported  Weight (Patient Reported): 88.9 kg (196 lb)  Pain Score: No Pain (0)        I have reviewed and updated patient's allergy and medication list.    Concerns: None  Refills: None      Luzma Long [facility]    THORACIC SURGERY FOLLOW UP VISIT    I saw Mr. Alexey Reed in follow-up today. The clinical summary follows:      DIAGNOSIS   Traumatic sternal fracture  Anterior chest wall hematoma   Bilateral pulmonary nodules  PROCEDURE   11/11/20 IR guided chest wall hematoma aspiration. "     COMPLICATIONS  None    INTERVAL STUDIES  11/11/20 Chest wall hematoma cytology: Negative for malignancy.     ETOH None   TOB None  BMI 28     Exam: Deferred (virtual visit)    SUBJECTIVE  Mr Reed is doing much better. The hematoma is much smaller since aspiration and has not gotten any bigger. The seatbelt is not bothering any more. He denies any sternal clicking.     From a personal perspective, he is at home with his family.     IMPRESSION (S20.219D) Hematoma of chest wall, unspecified laterality, subsequent encounter  (primary encounter diagnosis)  (S22.20XG) Closed fracture of sternum with delayed healing, unspecified portion of sternum, subsequent encounter    60 year old male patient with traumatic sternal fracture, chest wall hematoma and small bilateral pulmonary nodules. I discussed with Mr Reed that his sternal fracture appears to be healing without problems. The hematoma has almost resolved and cytology was negative for malignancy. In terms of the pulmonary nodules, he should continue to be followed by his PCP.     PLAN   I reviewed the plan as follows:  No need for ORIF of sternal fracture. Conservative management of chest wall hematoma.   Continued follow up of bilateral pulmonary nodules by PCP.   All questions were answered and the patient and present family were in agreement with the plan.  I appreciate the opportunity to participate in the care of your patient and will keep you updated.  Sincerely,      Video-Visit Details    Type of service:  Video Visit    Video Start Time: 3:35  Video End Time: 3:42    Originating Location (pt. Location): Home    Distant Location (provider location):  [facility]     Platform used for Video Visit: Sally Zamudio MD

## 2020-11-19 ENCOUNTER — TELEPHONE (OUTPATIENT)
Dept: SURGERY | Facility: CLINIC | Age: 60
End: 2020-11-19

## 2020-11-19 NOTE — TELEPHONE ENCOUNTER
I called Delgado to let him know his recent procedure to drain a collection of fluid on his chest wall was negative for malignancy.  I left my direct # to call back, if questions.

## 2021-01-10 DIAGNOSIS — Z72.0 TOBACCO ABUSE: ICD-10-CM

## 2021-01-11 DIAGNOSIS — R07.89 STERNAL PAIN: ICD-10-CM

## 2021-01-13 RX ORDER — VARENICLINE TARTRATE 1 MG/1
TABLET, FILM COATED ORAL
Qty: 56 TABLET | Refills: 0 | Status: SHIPPED | OUTPATIENT
Start: 2021-01-13 | End: 2021-04-23

## 2021-01-14 RX ORDER — NAPROXEN 500 MG/1
500 TABLET ORAL 2 TIMES DAILY PRN
Qty: 60 TABLET | Refills: 0 | Status: SHIPPED | OUTPATIENT
Start: 2021-01-14 | End: 2022-03-18

## 2021-01-14 NOTE — TELEPHONE ENCOUNTER
Prescription approved per [facility] Refill Protocol.  Dulce Maria Branch RN

## 2021-02-03 ENCOUNTER — ANCILLARY PROCEDURE (OUTPATIENT)
Dept: CT IMAGING | Facility: CLINIC | Age: 61
End: 2021-02-03
Attending: NURSE PRACTITIONER
Payer: COMMERCIAL

## 2021-02-03 DIAGNOSIS — R91.8 PULMONARY NODULES: ICD-10-CM

## 2021-02-03 PROCEDURE — 71250 CT THORAX DX C-: CPT | Mod: GC | Performed by: RADIOLOGY

## 2021-03-11 ENCOUNTER — TRANSFERRED RECORDS (OUTPATIENT)
Dept: HEALTH INFORMATION MANAGEMENT | Facility: CLINIC | Age: 61
End: 2021-03-11

## 2021-04-04 DIAGNOSIS — I10 HYPERTENSION GOAL BP (BLOOD PRESSURE) < 140/90: ICD-10-CM

## 2021-04-04 DIAGNOSIS — E78.5 HYPERLIPIDEMIA LDL GOAL <130: ICD-10-CM

## 2021-04-04 DIAGNOSIS — M79.2 NEUROPATHIC PAIN SYNDROME (NON-HERPETIC): ICD-10-CM

## 2021-04-07 RX ORDER — SIMVASTATIN 40 MG
TABLET ORAL
Qty: 90 TABLET | Refills: 0 | Status: SHIPPED | OUTPATIENT
Start: 2021-04-07 | End: 2021-06-03

## 2021-04-07 RX ORDER — SERTRALINE HYDROCHLORIDE 100 MG/1
TABLET, FILM COATED ORAL
Qty: 90 TABLET | Refills: 0 | Status: SHIPPED | OUTPATIENT
Start: 2021-04-07 | End: 2021-06-03

## 2021-04-07 RX ORDER — LOSARTAN POTASSIUM 100 MG/1
TABLET ORAL
Qty: 90 TABLET | Refills: 0 | Status: SHIPPED | OUTPATIENT
Start: 2021-04-07 | End: 2021-06-03

## 2021-04-08 DIAGNOSIS — M79.2 NEUROPATHIC PAIN SYNDROME (NON-HERPETIC): ICD-10-CM

## 2021-04-08 RX ORDER — PREGABALIN 150 MG/1
CAPSULE ORAL
Qty: 540 CAPSULE | Refills: 0 | Status: SHIPPED | OUTPATIENT
Start: 2021-04-08 | End: 2021-06-28

## 2021-04-08 NOTE — TELEPHONE ENCOUNTER
Okay refill but needs to be seen in the next couple months    Please inform patient    David Morse MD

## 2021-04-13 ENCOUNTER — MYC MEDICAL ADVICE (OUTPATIENT)
Dept: FAMILY MEDICINE | Facility: CLINIC | Age: 61
End: 2021-04-13

## 2021-04-22 DIAGNOSIS — Z72.0 TOBACCO ABUSE: ICD-10-CM

## 2021-04-23 RX ORDER — VARENICLINE TARTRATE 1 MG/1
TABLET, FILM COATED ORAL
Qty: 56 TABLET | Refills: 0 | Status: SHIPPED | OUTPATIENT
Start: 2021-04-23 | End: 2021-06-03

## 2021-06-03 ENCOUNTER — OFFICE VISIT (OUTPATIENT)
Dept: FAMILY MEDICINE | Facility: CLINIC | Age: 61
End: 2021-06-03
Payer: COMMERCIAL

## 2021-06-03 VITALS
OXYGEN SATURATION: 96 % | HEIGHT: 70 IN | BODY MASS INDEX: 28.63 KG/M2 | WEIGHT: 200 LBS | DIASTOLIC BLOOD PRESSURE: 88 MMHG | HEART RATE: 90 BPM | SYSTOLIC BLOOD PRESSURE: 135 MMHG | TEMPERATURE: 98.1 F

## 2021-06-03 DIAGNOSIS — Z00.00 ROUTINE GENERAL MEDICAL EXAMINATION AT A HEALTH CARE FACILITY: Primary | ICD-10-CM

## 2021-06-03 DIAGNOSIS — L98.9 SKIN LESIONS: ICD-10-CM

## 2021-06-03 DIAGNOSIS — R91.8 PULMONARY NODULES: ICD-10-CM

## 2021-06-03 DIAGNOSIS — E78.5 HYPERLIPIDEMIA LDL GOAL <100: ICD-10-CM

## 2021-06-03 DIAGNOSIS — I10 HYPERTENSION GOAL BP (BLOOD PRESSURE) < 140/90: ICD-10-CM

## 2021-06-03 DIAGNOSIS — R73.01 IMPAIRED FASTING GLUCOSE: ICD-10-CM

## 2021-06-03 DIAGNOSIS — Z72.0 TOBACCO ABUSE: ICD-10-CM

## 2021-06-03 DIAGNOSIS — M79.2 NEUROPATHIC PAIN SYNDROME (NON-HERPETIC): ICD-10-CM

## 2021-06-03 DIAGNOSIS — E78.5 HYPERLIPIDEMIA LDL GOAL <130: ICD-10-CM

## 2021-06-03 LAB
ALBUMIN SERPL-MCNC: 3.6 G/DL (ref 3.4–5)
ALP SERPL-CCNC: 113 U/L (ref 40–150)
ALT SERPL W P-5'-P-CCNC: 30 U/L (ref 0–70)
AMPHETAMINES UR QL: NOT DETECTED NG/ML
ANION GAP SERPL CALCULATED.3IONS-SCNC: 1 MMOL/L (ref 3–14)
AST SERPL W P-5'-P-CCNC: 18 U/L (ref 0–45)
BARBITURATES UR QL SCN: NOT DETECTED NG/ML
BENZODIAZ UR QL SCN: NOT DETECTED NG/ML
BILIRUB SERPL-MCNC: 0.4 MG/DL (ref 0.2–1.3)
BUN SERPL-MCNC: 9 MG/DL (ref 7–30)
BUPRENORPHINE UR QL: NOT DETECTED NG/ML
CALCIUM SERPL-MCNC: 8.9 MG/DL (ref 8.5–10.1)
CANNABINOIDS UR QL: NOT DETECTED NG/ML
CHLORIDE SERPL-SCNC: 109 MMOL/L (ref 94–109)
CHOLEST SERPL-MCNC: 151 MG/DL
CO2 SERPL-SCNC: 30 MMOL/L (ref 20–32)
COCAINE UR QL SCN: NOT DETECTED NG/ML
CREAT SERPL-MCNC: 0.85 MG/DL (ref 0.66–1.25)
D-METHAMPHET UR QL: NOT DETECTED NG/ML
GFR SERPL CREATININE-BSD FRML MDRD: >90 ML/MIN/{1.73_M2}
GLUCOSE SERPL-MCNC: 109 MG/DL (ref 70–99)
HBA1C MFR BLD: 6.1 % (ref 0–5.6)
HDLC SERPL-MCNC: 51 MG/DL
LDLC SERPL CALC-MCNC: 89 MG/DL
METHADONE UR QL SCN: NOT DETECTED NG/ML
NONHDLC SERPL-MCNC: 100 MG/DL
OPIATES UR QL SCN: NOT DETECTED NG/ML
OXYCODONE UR QL SCN: NOT DETECTED NG/ML
PCP UR QL SCN: NOT DETECTED NG/ML
POTASSIUM SERPL-SCNC: 4.4 MMOL/L (ref 3.4–5.3)
PROPOXYPH UR QL: NOT DETECTED NG/ML
PROT SERPL-MCNC: 7.2 G/DL (ref 6.8–8.8)
SODIUM SERPL-SCNC: 140 MMOL/L (ref 133–144)
TRICYCLICS UR QL SCN: NOT DETECTED NG/ML
TRIGL SERPL-MCNC: 57 MG/DL

## 2021-06-03 PROCEDURE — 80306 DRUG TEST PRSMV INSTRMNT: CPT | Performed by: FAMILY MEDICINE

## 2021-06-03 PROCEDURE — 80061 LIPID PANEL: CPT | Performed by: FAMILY MEDICINE

## 2021-06-03 PROCEDURE — 99213 OFFICE O/P EST LOW 20 MIN: CPT | Mod: 25 | Performed by: FAMILY MEDICINE

## 2021-06-03 PROCEDURE — 83036 HEMOGLOBIN GLYCOSYLATED A1C: CPT | Performed by: FAMILY MEDICINE

## 2021-06-03 PROCEDURE — 36415 COLL VENOUS BLD VENIPUNCTURE: CPT | Performed by: FAMILY MEDICINE

## 2021-06-03 PROCEDURE — 80053 COMPREHEN METABOLIC PANEL: CPT | Performed by: FAMILY MEDICINE

## 2021-06-03 PROCEDURE — 99396 PREV VISIT EST AGE 40-64: CPT | Performed by: FAMILY MEDICINE

## 2021-06-03 RX ORDER — HYDROCODONE BITARTRATE AND ACETAMINOPHEN 5; 325 MG/1; MG/1
1 TABLET ORAL EVERY 6 HOURS PRN
Qty: 50 TABLET | Refills: 0 | Status: SHIPPED | OUTPATIENT
Start: 2021-06-03 | End: 2022-03-18

## 2021-06-03 RX ORDER — SIMVASTATIN 40 MG
40 TABLET ORAL AT BEDTIME
Qty: 90 TABLET | Refills: 3 | Status: SHIPPED | OUTPATIENT
Start: 2021-06-03 | End: 2022-06-20

## 2021-06-03 RX ORDER — SERTRALINE HYDROCHLORIDE 100 MG/1
100 TABLET, FILM COATED ORAL DAILY
Qty: 90 TABLET | Refills: 3 | Status: SHIPPED | OUTPATIENT
Start: 2021-06-03 | End: 2022-06-20

## 2021-06-03 RX ORDER — VARENICLINE TARTRATE 1 MG/1
1 TABLET, FILM COATED ORAL 2 TIMES DAILY
Qty: 60 TABLET | Refills: 2 | Status: SHIPPED | OUTPATIENT
Start: 2021-06-03 | End: 2022-03-18

## 2021-06-03 RX ORDER — LOSARTAN POTASSIUM 100 MG/1
100 TABLET ORAL DAILY
Qty: 90 TABLET | Refills: 3 | Status: SHIPPED | OUTPATIENT
Start: 2021-06-03 | End: 2022-06-20

## 2021-06-03 ASSESSMENT — ENCOUNTER SYMPTOMS
DIZZINESS: 0
ABDOMINAL PAIN: 0
COUGH: 1
DIARRHEA: 0
FEVER: 0
NERVOUS/ANXIOUS: 0
CONSTIPATION: 0
HEMATOCHEZIA: 0
CHILLS: 0
EYE PAIN: 0
FREQUENCY: 0
HEMATURIA: 0

## 2021-06-03 ASSESSMENT — MIFFLIN-ST. JEOR: SCORE: 1723.44

## 2021-06-03 NOTE — PROGRESS NOTES
SUBJECTIVE:   CC: Alexey Reed is an 60 year old male who presents for preventative health visit.        Patient has been advised of split billing requirements and indicates understanding: Yes  Healthy Habits:     Getting at least 3 servings of Calcium per day:  NO    Bi-annual eye exam:  Yes    Dental care twice a year:  Yes    Sleep apnea or symptoms of sleep apnea:  None    Diet:  Regular (no restrictions)    Frequency of exercise:  2-3 days/week    Duration of exercise:  Less than 15 minutes    Taking medications regularly:  Yes    Medication side effects:  Other    PHQ-2 Total Score: 0    Additional concerns today:  Yes               Today's PHQ-2 Score:   PHQ-2 ( 1999 Pf[employer]) 6/3/2021   Q1: Little interest or pleasure in doing things 0   Q2: Feeling down, depressed or hopeless 0   PHQ-2 Score 0   Q1: Little interest or pleasure in doing things Not at all   Q2: Feeling down, depressed or hopeless Not at all   PHQ-2 Score 0       Abuse: Current or Past(Physical, Sexual or Emotional)- No  Do you feel safe in your environment? Yes        Social History     Tobacco Use     Smoking status: Current Every Day Smoker     Packs/day: 0.50     Years: 21.00     Pack years: 10.50     Types: Cigarettes     Smokeless tobacco: Never Used   Substance Use Topics     Alcohol use: No     If you drink alcohol do you typically have >3 drinks per day or >7 drinks per week? No    Alcohol Use 6/3/2021   Prescreen: >3 drinks/day or >7 drinks/week? Not Applicable   Prescreen: >3 drinks/day or >7 drinks/week? -   No flowsheet data found.    Last PSA:   PSA   Date Value Ref Range Status   09/08/2020 2.13 0 - 4 ug/L Final     Comment:     Assay Method:  Chemiluminescence using Siemens Vista analyzer       Reviewed orders with patient. Reviewed health maintenance and updated orders accordingly - Yes       Reviewed and updated as needed this visit by clinical staff  Tobacco  Allergies  Meds   Med Hx  Surg Hx  Fam Hx  Soc Hx     "    Reviewed and updated as needed this visit by Provider                    Review of Systems   Constitutional: Negative for chills and fever.   HENT: Positive for congestion. Negative for ear pain.    Eyes: Negative for pain.   Respiratory: Positive for cough.    Cardiovascular: Negative for chest pain.   Gastrointestinal: Negative for abdominal pain, constipation, diarrhea and hematochezia.   Genitourinary: Negative for frequency, genital sores and hematuria.   Neurological: Negative for dizziness.   Psychiatric/Behavioral: The patient is not nervous/anxious.      2 moles to look at  Finger fx last year   Chest CT      Finger hurts in cold  Can't quite flex or extend fully left 5th finger    Smoking on ppd    chantix works well if not on lyrica    Can't get erection on graham  Facial pain    Refill chantix    Walking a lot    Active at work        OBJECTIVE:   BP (!) 152/89 (BP Location: Left arm, Patient Position: Chair, Cuff Size: Adult Regular)   Pulse 90   Temp 98.1  F (36.7  C) (Temporal)   Ht 1.778 m (5' 10\")   Wt 90.7 kg (200 lb)   SpO2 96%   BMI 28.70 kg/m      Physical Exam  GENERAL: healthy, alert and no distress  EYES: Eyes grossly normal to inspection, PERRL and conjunctivae and sclerae normal  HENT: ear canals and TM's normal, nose and mouth without ulcers or lesions  NECK: no adenopathy, no asymmetry, masses, or scars and thyroid normal to palpation  RESP: lungs clear to auscultation - no rales, rhonchi or wheezes  CV: regular rate and rhythm, normal S1 S2, no S3 or S4, no murmur, click or rub, no peripheral edema and peripheral pulses strong  ABDOMEN: soft, nontender, no hepatosplenomegaly, no masses and bowel sounds normal  MS: no gross musculoskeletal defects noted, no edema  SKIN: scattered moles.  Chronic sun damage on the shoulders and upper back.  NEURO: Normal strength and tone, mentation intact and speech normal  PSYCH: mentation appears normal, affect normal/bright  Left 5th finger " not able to fully flex or extend but good strength       Diagnostic Test Results:  Labs reviewed in Epic    ASSESSMENT/PLAN:   Alexey was seen today for physical and health maintenance.    Diagnoses and all orders for this visit:    Routine general medical examination at a health care facility    Neuropathic pain syndrome (non-herpetic)  -     Drug Abuse Screen Panel 13, Urine ([facility])  -     sertraline (ZOLOFT) 100 MG tablet; Take 1 tablet (100 mg) by mouth daily  -     HYDROcodone-acetaminophen (NORCO) 5-325 MG tablet; Take 1 tablet by mouth every 6 hours as needed for pain    Hypertension goal BP (blood pressure) < 140/90  -     losartan (COZAAR) 100 MG tablet; Take 1 tablet (100 mg) by mouth daily    Tobacco abuse  -     varenicline (CHANTIX) 1 MG tablet; Take 1 tablet (1 mg) by mouth 2 times daily    Impaired fasting glucose  -     Hemoglobin A1c    Hyperlipidemia LDL goal <100  -     Lipid panel reflex to direct LDL Fasting  -     Comprehensive metabolic panel    Hyperlipidemia LDL goal <130  -     simvastatin (ZOCOR) 40 MG tablet; Take 1 tablet (40 mg) by mouth At Bedtime    Skin lesions  -     ADULT DERMATOLOGY REFERRAL; Future    Other orders  -     REVIEW OF HEALTH MAINTENANCE PROTOCOL ORDERS    Discussed multiple issues with patient  Trial of lower dose of lyrica, to 150 tid, to see if this provided adequate pain control while allowing for chantix to work and him to get adequate erections  Let us know in a few weeks the result of this change  Check labs fasting  Smoking cessation  Up to date on colonoscopy  No std concern  Keep working on healthy diet/exercise   On recheck blood pressure okay  Patient got covid vaccines  Patient to schedule dermatology consult  Did refill vicodin but try to use sparingly; used to be 100 pills lasted a year  Gave 50   Went over chest ct; plan recheck ct early next year, patient to call and remind us to order this  Finger is not real bothersome to patient,  "monitor      Patient has been advised of split billing requirements and indicates understanding: Yes  COUNSELING:   Reviewed preventive health counseling, as reflected in patient instructions       Regular exercise       Healthy diet/nutrition       Vision screening       Safe sex practices/STD prevention       Colon cancer screening       Prostate cancer screening    Estimated body mass index is 28.7 kg/m  as calculated from the following:    Height as of this encounter: 1.778 m (5' 10\").    Weight as of this encounter: 90.7 kg (200 lb).     Weight management plan: Discussed healthy diet and exercise guidelines    He reports that he has been smoking cigarettes. He has a 10.50 pack-year smoking history. He has never used smokeless tobacco.  Tobacco Cessation Action Plan:   Pharmacotherapies : Chantix      Counseling Resources:  ATP IV Guidelines  Pooled Cohorts Equation Calculator  FRAX Risk Assessment  ICSI Preventive Guidelines  Dietary Guidelines for Americans, 2010  USDA's MyPlate  ASA Prophylaxis  Lung CA Screening    David Morse MD  [facility]  "

## 2021-06-03 NOTE — PATIENT INSTRUCTIONS
Taper down to 1 lyrica 3 x daily ( 150 x 3 ) and then in a few weeks leave us a message with how it is working    Then restart chantix    Smoking cessation    Keep working on healthy diet/exercise     Monitor finger     Plan ct chest early next year    See dermatology        `

## 2021-06-29 ENCOUNTER — TELEPHONE (OUTPATIENT)
Dept: FAMILY MEDICINE | Facility: CLINIC | Age: 61
End: 2021-06-29

## 2021-06-29 NOTE — TELEPHONE ENCOUNTER
Prior Authorization Approval    Authorization Effective Date: 5/30/2021  Authorization Expiration Date: 6/29/2022  Medication: proair respiclick  Approved Dose/Quantity:    Reference #:     Insurance Company: [employer] - Phone [phone] Fax [phone]  Expected CoPay:       CoPay Card Available:      Foundation Assistance Needed:    Which Pharmacy is filling the prescription (Not needed for infusion/clinic administered): [facility] #[id number] [place] [address] [place] [address] [facility] [address]  Pharmacy Notified: Yes  Patient Notified: Yes  **Instructed pharmacy to notify patient when script is ready to /ship.**

## 2021-06-29 NOTE — TELEPHONE ENCOUNTER
Prior Authorization Retail Medication Request    Medication/Dose: PROAIR RESPICLICK 108 (90 Base) MCG/ACT inhaler  ICD code (if different than what is on RX):  R06.02      Insurance Name:  [employer] CHOICE  Insurance ID:  [id number]       Pharmacy Information (if different than what is on RX)  Name:  [facility]  Phone:  [phone]

## 2021-06-29 NOTE — TELEPHONE ENCOUNTER
Central Prior Authorization Team   Phone: [phone]      PA Initiation    Medication: proair respiclick  Insurance Company: [employer] - Phone [phone] Fax [phone]  Pharmacy Filling the Rx: [facility] #[id number] [place], [place] - [address] [place] AT [facility]  [address]  Filling Pharmacy Phone: [phone]  Filling Pharmacy Fax:    Start Date: 6/29/2021

## 2021-08-10 ENCOUNTER — OFFICE VISIT (OUTPATIENT)
Dept: DERMATOLOGY | Facility: CLINIC | Age: 61
End: 2021-08-10
Attending: FAMILY MEDICINE
Payer: COMMERCIAL

## 2021-08-10 DIAGNOSIS — L98.9 SKIN LESIONS: ICD-10-CM

## 2021-08-10 DIAGNOSIS — L82.1 SEBORRHEIC KERATOSIS: ICD-10-CM

## 2021-08-10 DIAGNOSIS — B07.9 VERRUCA VULGARIS: ICD-10-CM

## 2021-08-10 DIAGNOSIS — D18.01 CHERRY ANGIOMA: ICD-10-CM

## 2021-08-10 DIAGNOSIS — D22.9 MULTIPLE BENIGN NEVI: ICD-10-CM

## 2021-08-10 DIAGNOSIS — L81.4 SOLAR LENTIGO: Primary | ICD-10-CM

## 2021-08-10 PROCEDURE — 99203 OFFICE O/P NEW LOW 30 MIN: CPT | Mod: 25 | Performed by: DERMATOLOGY

## 2021-08-10 PROCEDURE — 17110 DESTRUCTION B9 LES UP TO 14: CPT | Performed by: DERMATOLOGY

## 2021-08-10 ASSESSMENT — PAIN SCALES - GENERAL: PAINLEVEL: NO PAIN (0)

## 2021-08-10 NOTE — PROGRESS NOTES
[facility] Dermatology Note  Encounter Date: Aug 10, 2021  Office Visit     Dermatology Problem List:  1. Verruca vulgaris. LiqN2.   2. SKs, lentigos.     ____________________________________________    Assessment & Plan:     # Benign lesions: Multiple benign nevi, solar lentigos, seborrheic keratoses, cherry angiomas. Explained to patient benign nature of lesion. No treatment is necessary at this time unless the lesion changes or becomes symptomatic.     - ABCDs of melanoma were discussed and self skin checks were advised.  - Sun precaution was advised including the use of sun screens of SPF 30 or higher, sun protective clothing, and avoidance of tanning beds.    # Verruca vulgaris, right hand x 3.   - Cryotherapy performed today (see procedure note(s) below).     Procedures Performed:   - Cryotherapy procedure note, location(s): right hand. After verbal consent and discussion of risks and benefits including, but not limited to, dyspigmentation/scar, blister, and pain, 3 lesion(s) was(were) treated with 1-2 mm freeze border for 1-2 cycles with liquid nitrogen. Post cryotherapy instructions were provided.    Follow-up: 6 week(s) in-person, or earlier for new or changing lesions    Staff:     Garett Escamilla MD  Pronouns: he/him/his    Department of Dermatology  [facility]: Phone: [phone], Fax:[phone]  [facility]: Phone: [phone] Fax: [phone]    ____________________________________________    CC: Derm Problem (Delgado is here today in order to have several spots examined. He states that he has two spots on his face, a spot on his back, and a spot on his left medial thight that he would like examined. Delgado further notes a couple warts located on his right hand. )    HPI:  Mr. Alexey Reed is a(n) 60 year old male who presents today as a new patient for  full body skin examination. He reports a few areas of concern:    (1) Warts on the right hand. Have been present for a few years and developing a few new lesions. No recent treatments.     (2) Brown spots on the right temple and the left thigh. No pain, tenderness, or bleeding.     The patient otherwise denies any new or concerning lesions. No bleeding, painful, pruritic, or changing lesions. They report no personal history of skin cancer. There is no family history of skin cancer. No history of immunosuppression. No history of indoor tanning. They do use sunscreen and protective clothing when outdoors for sun protection. No occupational exposure to ultraviolet light or other forms of radiation. Patient is an . Health otherwise stable. No other skin concerns.     Patient is otherwise feeling well, without additional skin concerns.     Labs Reviewed:  N/A    Physical Exam:  Vitals: There were no vitals taken for this visit.  SKIN: Total skin excluding the undergarment areas was performed. The exam included the ad/face, neck, both arms, chest, back, abdomen, both legs, digits and/or nails.   - Brown macules on sun exposed areas  - Brown waxy, stuck-on appearing papules on face, trunk, and extremities.   - Brown macules and papules on trunk and extremities without concerning dermoscopy features  - Red vascular papules on face, trunk and extremities.   - Verrucous papules interrupting dermatoglyphs on the R5 and R1 digits x 3.   - No other lesions of concern on areas examined.     Medications:  Current Outpatient Medications   Medication     albuterol (PROAIR HFA/PROVENTIL HFA/VENTOLIN HFA) 108 (90 Base) MCG/ACT inhaler     aspirin 81 MG EC tablet     HYDROcodone-acetaminophen (NORCO) 5-325 MG tablet     losartan (COZAAR) 100 MG tablet     MULTI-VITAMIN OR TABS     naproxen (NAPROSYN) 500 MG tablet     pregabalin (LYRICA) 150 MG capsule     PROAIR RESPICLICK 108 (90 Base) MCG/ACT inhaler     sertraline (ZOLOFT)  100 MG tablet     simvastatin (ZOCOR) 40 MG tablet     varenicline (CHANTIX) 1 MG tablet     No current facility-administered medications for this visit.      Past Medical History:   Patient Active Problem List   Diagnosis     Neuropathic pain syndrome (non-herpetic)     HYPERLIPIDEMIA LDL GOAL <130     Tenosynovitis of thumb     AC joint arthropathy     Anxiety     Hyperlipidemia with target LDL less than 130     Chronic pain disorder     FH: prostate cancer     Hypertension goal BP (blood pressure) < 140/90     Pulmonary nodules     Past Medical History:   Diagnosis Date     Hyperlipaemia      Neuropathic pain syndrome (non-herpetic)      Unspecified essential hypertension        CC David Morse MD  [address] [facility]  [place] [address] on close of this encounter.

## 2021-08-10 NOTE — LETTER
8/10/2021       RE: Alexey Reed  [address]  [place] [address]     Dear Colleague,    Thank you for referring your patient, Alexey Reed, to the [facility] [place] at [facility]. Please see a copy of my visit note below.    [facility] Dermatology Note  Encounter Date: Aug 10, 2021  Office Visit     Dermatology Problem List:  1. Verruca vulgaris. LiqN2.   2. SKs, lentigos.     ____________________________________________    Assessment & Plan:     # Benign lesions: Multiple benign nevi, solar lentigos, seborrheic keratoses, cherry angiomas. Explained to patient benign nature of lesion. No treatment is necessary at this time unless the lesion changes or becomes symptomatic.     - ABCDs of melanoma were discussed and self skin checks were advised.  - Sun precaution was advised including the use of sun screens of SPF 30 or higher, sun protective clothing, and avoidance of tanning beds.    # Verruca vulgaris, right hand x 3.   - Cryotherapy performed today (see procedure note(s) below).     Procedures Performed:   - Cryotherapy procedure note, location(s): right hand. After verbal consent and discussion of risks and benefits including, but not limited to, dyspigmentation/scar, blister, and pain, 3 lesion(s) was(were) treated with 1-2 mm freeze border for 1-2 cycles with liquid nitrogen. Post cryotherapy instructions were provided.    Follow-up: 6 week(s) in-person, or earlier for new or changing lesions    Staff:     Garett Escamilla MD  Pronouns: he/him/his    Department of Dermatology  [facility]: Phone: [phone], Fax:[phone]  [facility]: Phone: [phone] Fax: [phone]    ____________________________________________    CC: Derm Problem (Delgado is here today in order to have  several spots examined. He states that he has two spots on his face, a spot on his back, and a spot on his left medial thight that he would like examined. Delgado further notes a couple warts located on his right hand. )    HPI:  Mr. Alexey Reed is a(n) 60 year old male who presents today as a new patient for full body skin examination. He reports a few areas of concern:    (1) Warts on the right hand. Have been present for a few years and developing a few new lesions. No recent treatments.     (2) Brown spots on the right temple and the left thigh. No pain, tenderness, or bleeding.     The patient otherwise denies any new or concerning lesions. No bleeding, painful, pruritic, or changing lesions. They report no personal history of skin cancer. There is no family history of skin cancer. No history of immunosuppression. No history of indoor tanning. They do use sunscreen and protective clothing when outdoors for sun protection. No occupational exposure to ultraviolet light or other forms of radiation. Patient is an . Health otherwise stable. No other skin concerns.     Patient is otherwise feeling well, without additional skin concerns.     Labs Reviewed:  N/A    Physical Exam:  Vitals: There were no vitals taken for this visit.  SKIN: Total skin excluding the undergarment areas was performed. The exam included the ad/face, neck, both arms, chest, back, abdomen, both legs, digits and/or nails.   - Brown macules on sun exposed areas  - Brown waxy, stuck-on appearing papules on face, trunk, and extremities.   - Brown macules and papules on trunk and extremities without concerning dermoscopy features  - Red vascular papules on face, trunk and extremities.   - Verrucous papules interrupting dermatoglyphs on the R5 and R1 digits x 3.   - No other lesions of concern on areas examined.     Medications:  Current Outpatient Medications   Medication     albuterol (PROAIR HFA/PROVENTIL HFA/VENTOLIN HFA) 108 (90 Base)  MCG/ACT inhaler     aspirin 81 MG EC tablet     HYDROcodone-acetaminophen (NORCO) 5-325 MG tablet     losartan (COZAAR) 100 MG tablet     MULTI-VITAMIN OR TABS     naproxen (NAPROSYN) 500 MG tablet     pregabalin (LYRICA) 150 MG capsule     PROAIR RESPICLICK 108 (90 Base) MCG/ACT inhaler     sertraline (ZOLOFT) 100 MG tablet     simvastatin (ZOCOR) 40 MG tablet     varenicline (CHANTIX) 1 MG tablet     No current facility-administered medications for this visit.      Past Medical History:   Patient Active Problem List   Diagnosis     Neuropathic pain syndrome (non-herpetic)     HYPERLIPIDEMIA LDL GOAL <130     Tenosynovitis of thumb     AC joint arthropathy     Anxiety     Hyperlipidemia with target LDL less than 130     Chronic pain disorder     FH: prostate cancer     Hypertension goal BP (blood pressure) < 140/90     Pulmonary nodules     Past Medical History:   Diagnosis Date     Hyperlipaemia      Neuropathic pain syndrome (non-herpetic)      Unspecified essential hypertension        CC David Morse MD  [address] [facility] [place] [address] on close of this encounter.

## 2021-08-10 NOTE — NURSING NOTE
Dermatology Rooming Note    Alexey Reed's goals for this visit include:   Chief Complaint   Patient presents with     Derm Problem     Delgado is here today in order to have several spots examined. He states that he has two spots on his face, a spot on his back, and a spot on his left medial thight that he would like examined. Delgado further notes a couple warts located on his right hand.      Pro Gould, EMT

## 2021-08-10 NOTE — PATIENT INSTRUCTIONS
Cryotherapy    What is it?    Use of a very cold liquid, such as liquid nitrogen, to freeze and destroy abnormal skin cells that need to be removed    What should I expect?    Tenderness and redness    A small blister that might grow and fill with dark purple blood. There may be crusting.    More than one treatment may be needed if the lesions do not go away.    How do I care for the treated area?    Gently wash the area with your hands when bathing.    Use a thin layer of Vaseline to help with healing. You may use a Band-Aid.     The area should heal within 7-10 days and may leave behind a pink or lighter color.     Do not use an antibiotic or Neosporin ointment.     You may take acetaminophen (Tylenol) for pain.     Call your doctor if you have:    Severe pain    Signs of infection (warmth, redness, cloudy yellow drainage, and or a bad smell)    Questions or concerns    Who should I call with questions?       [facility]: [phone]       [facility]: [phone]       For urgent needs outside of business hours call the [facility] at [phone] and ask for the dermatology resident on call    Overview    Seborrheic keratoses are common benign growths of unknown cause seen in adults   due to a thickening of an area of the top skin layer.    Who's At Risk  Although they can occur anytime after puberty, almost everyone over 50 has one or more of these and they increase in number with age. Some families have an inherited tendency to grow multiple lesions. Men and women are equally as likely to develop seborrheic keratoses. Dark-skinned people are less affected than those with light skin; a variant seen in blacks is called dermatosis papulosa nigra.    Signs & Symptoms  One or more spots can occur anywhere on the body, except for palms, soles, and mucous membranes (eg, in the mouth or rectum). They do not go away. They do not turn into cancers,  "but some cancers resemble seborrheic keratosis.    They start as light brown to skin-colored, flat areas, which are round to oval and of varying size (usually less than a half inch, but sometimes much larger). As they grow thicker and rise above the skin surface, seborrheic keratoses may become dark brown to almost black with a \"stuck on\" appearance. The surface may feel smooth or rough.    Self-Care Guidelines  No treatment is needed unless there is irritation from clothing with itching or bleeding.    There is no way to prevent new spots from forming.    Some lotions with alpha hydroxyl acids may make the areas feel smoother with regular use but will not eliminate them.    OTC freezing techniques are available but usually not effective.    When to Seek Medical Care  If a spot on the skin is growing, bleeding, painful, or itchy, see your doctor.    Spots can be removed if you don't want them, but removal is considered a cosmetic issue and is usually not covered by insurance.    Treatments Your Physician May Prescribe  Removal can be accomplished with freezing (cryosurgery), scraping (curettage), burning (electrocautery), lasers, or with acids. The doctor might conduct a biopsy if the growth looks unusual.    References:  Will Lee, ed. Dermatology, pp.3795-1919. [place]: [employer], 2003.    Rajinder Hall, ed. Fidencio's Dermatology in General Medicine. 6th ed, pp.767-770. [place]: [employer], 2003.    "

## 2021-09-18 ENCOUNTER — HEALTH MAINTENANCE LETTER (OUTPATIENT)
Age: 61
End: 2021-09-18

## 2021-10-11 ENCOUNTER — MYC MEDICAL ADVICE (OUTPATIENT)
Dept: FAMILY MEDICINE | Facility: CLINIC | Age: 61
End: 2021-10-11

## 2021-10-15 DIAGNOSIS — R06.02 SOB (SHORTNESS OF BREATH): ICD-10-CM

## 2021-10-15 DIAGNOSIS — R06.2 WHEEZING: ICD-10-CM

## 2021-10-18 RX ORDER — ALBUTEROL SULFATE 90 UG/1
AEROSOL, METERED RESPIRATORY (INHALATION)
Qty: 18 G | Refills: 1 | Status: SHIPPED | OUTPATIENT
Start: 2021-10-18 | End: 2021-12-08

## 2021-10-18 NOTE — TELEPHONE ENCOUNTER
Prescription approved per [facility] Refill Protocol.  Dulce Maria Branch RN

## 2021-12-06 DIAGNOSIS — R06.2 WHEEZING: ICD-10-CM

## 2021-12-06 DIAGNOSIS — R06.02 SOB (SHORTNESS OF BREATH): ICD-10-CM

## 2021-12-08 RX ORDER — ALBUTEROL SULFATE 90 UG/1
AEROSOL, METERED RESPIRATORY (INHALATION)
Qty: 8.5 G | Refills: 1 | Status: SHIPPED | OUTPATIENT
Start: 2021-12-08 | End: 2022-01-24

## 2021-12-08 NOTE — TELEPHONE ENCOUNTER
"Prescription approved per [facility] Refill Protocol.  Requested Prescriptions   Pending Prescriptions Disp Refills     albuterol (PROAIR HFA/PROVENTIL HFA/VENTOLIN HFA) 108 (90 Base) MCG/ACT inhaler [Pharmacy Med Name: ALBUTEROL HFA INH (200 PUFFS)8.5GM] 8.5 g      Sig: INHALE 2 PUFFS INTO THE LUNGS EVERY 6 HOURS AS NEEDED FOR SHORTNESS OF BREATH OR DIFFICULT BREATHING OR WHEEZING       Asthma Maintenance Inhalers - Anticholinergics Passed - 12/6/2021  1:28 PM        Passed - Patient is age 12 years or older        Passed - Recent (12 mo) or future (30 days) visit within the authorizing provider's specialty     Patient has had an office visit with the authorizing provider or a provider within the authorizing providers department within the previous 12 mos or has a future within next 30 days. See \"Patient Info\" tab in inbasket, or \"Choose Columns\" in Meds & Orders section of the refill encounter.              Passed - Medication is active on med list       Short-Acting Beta Agonist Inhalers Protocol  Passed - 12/6/2021  1:28 PM        Passed - Patient is age 12 or older        Passed - Recent (12 mo) or future (30 days) visit within the authorizing provider's specialty     Patient has had an office visit with the authorizing provider or a provider within the authorizing providers department within the previous 12 mos or has a future within next 30 days. See \"Patient Info\" tab in inbasket, or \"Choose Columns\" in Meds & Orders section of the refill encounter.              Passed - Medication is active on med list           Bettina Jacobo RN on 12/8/2021 at 10:25 AM          "

## 2022-01-04 ENCOUNTER — MYC MEDICAL ADVICE (OUTPATIENT)
Dept: FAMILY MEDICINE | Facility: CLINIC | Age: 62
End: 2022-01-04
Payer: COMMERCIAL

## 2022-01-20 DIAGNOSIS — R06.02 SOB (SHORTNESS OF BREATH): ICD-10-CM

## 2022-01-20 DIAGNOSIS — R06.2 WHEEZING: ICD-10-CM

## 2022-01-24 RX ORDER — ALBUTEROL SULFATE 90 UG/1
AEROSOL, METERED RESPIRATORY (INHALATION)
Qty: 8.5 G | Refills: 1 | Status: SHIPPED | OUTPATIENT
Start: 2022-01-24 | End: 2022-03-11

## 2022-01-24 NOTE — TELEPHONE ENCOUNTER
Prescription approved per [facility] Refill Protocol.  Dulce Maria Branch RN

## 2022-03-01 ENCOUNTER — ANCILLARY PROCEDURE (OUTPATIENT)
Dept: CT IMAGING | Facility: CLINIC | Age: 62
End: 2022-03-01
Attending: FAMILY MEDICINE
Payer: COMMERCIAL

## 2022-03-01 DIAGNOSIS — R91.8 PULMONARY NODULES: ICD-10-CM

## 2022-03-01 PROCEDURE — 71250 CT THORAX DX C-: CPT | Performed by: RADIOLOGY

## 2022-03-02 ENCOUNTER — TELEPHONE (OUTPATIENT)
Dept: FAMILY MEDICINE | Facility: CLINIC | Age: 62
End: 2022-03-02
Payer: COMMERCIAL

## 2022-03-02 NOTE — RESULT ENCOUNTER NOTE
I tried to call with these results.  Not worrisome.  The nodules are unchanged.  We can repeat CT in a year.    David Morse MD

## 2022-03-18 ENCOUNTER — OFFICE VISIT (OUTPATIENT)
Dept: FAMILY MEDICINE | Facility: CLINIC | Age: 62
End: 2022-03-18
Payer: COMMERCIAL

## 2022-03-18 VITALS
SYSTOLIC BLOOD PRESSURE: 138 MMHG | TEMPERATURE: 97.9 F | WEIGHT: 170 LBS | BODY MASS INDEX: 24.34 KG/M2 | HEART RATE: 94 BPM | HEIGHT: 70 IN | DIASTOLIC BLOOD PRESSURE: 89 MMHG

## 2022-03-18 DIAGNOSIS — R19.7 DIARRHEA, UNSPECIFIED TYPE: Primary | ICD-10-CM

## 2022-03-18 DIAGNOSIS — R63.4 WEIGHT LOSS: ICD-10-CM

## 2022-03-18 DIAGNOSIS — Z12.11 SCREEN FOR COLON CANCER: ICD-10-CM

## 2022-03-18 DIAGNOSIS — Z11.4 SCREENING FOR HIV (HUMAN IMMUNODEFICIENCY VIRUS): ICD-10-CM

## 2022-03-18 DIAGNOSIS — Z72.0 TOBACCO ABUSE: ICD-10-CM

## 2022-03-18 DIAGNOSIS — Z12.5 SCREENING FOR PROSTATE CANCER: ICD-10-CM

## 2022-03-18 LAB
ALBUMIN SERPL-MCNC: 3.7 G/DL (ref 3.4–5)
ALP SERPL-CCNC: 89 U/L (ref 40–150)
ALT SERPL W P-5'-P-CCNC: 24 U/L (ref 0–70)
ANION GAP SERPL CALCULATED.3IONS-SCNC: 5 MMOL/L (ref 3–14)
AST SERPL W P-5'-P-CCNC: 14 U/L (ref 0–45)
BASOPHILS # BLD AUTO: 0 10E3/UL (ref 0–0.2)
BASOPHILS NFR BLD AUTO: 0 %
BILIRUB SERPL-MCNC: 0.4 MG/DL (ref 0.2–1.3)
BUN SERPL-MCNC: 11 MG/DL (ref 7–30)
CALCIUM SERPL-MCNC: 9.6 MG/DL (ref 8.5–10.1)
CHLORIDE BLD-SCNC: 106 MMOL/L (ref 94–109)
CO2 SERPL-SCNC: 28 MMOL/L (ref 20–32)
CREAT SERPL-MCNC: 0.8 MG/DL (ref 0.66–1.25)
EOSINOPHIL # BLD AUTO: 0.1 10E3/UL (ref 0–0.7)
EOSINOPHIL NFR BLD AUTO: 1 %
ERYTHROCYTE [DISTWIDTH] IN BLOOD BY AUTOMATED COUNT: 13.8 % (ref 10–15)
GFR SERPL CREATININE-BSD FRML MDRD: >90 ML/MIN/1.73M2
GLUCOSE BLD-MCNC: 129 MG/DL (ref 70–99)
HCT VFR BLD AUTO: 49.2 % (ref 40–53)
HGB BLD-MCNC: 16.4 G/DL (ref 13.3–17.7)
HIV 1+2 AB+HIV1 P24 AG SERPL QL IA: NONREACTIVE
LYMPHOCYTES # BLD AUTO: 2.1 10E3/UL (ref 0.8–5.3)
LYMPHOCYTES NFR BLD AUTO: 17 %
MCH RBC QN AUTO: 31.8 PG (ref 26.5–33)
MCHC RBC AUTO-ENTMCNC: 33.3 G/DL (ref 31.5–36.5)
MCV RBC AUTO: 96 FL (ref 78–100)
MONOCYTES # BLD AUTO: 1.2 10E3/UL (ref 0–1.3)
MONOCYTES NFR BLD AUTO: 10 %
NEUTROPHILS # BLD AUTO: 8.5 10E3/UL (ref 1.6–8.3)
NEUTROPHILS NFR BLD AUTO: 72 %
PLATELET # BLD AUTO: 276 10E3/UL (ref 150–450)
POTASSIUM BLD-SCNC: 4.3 MMOL/L (ref 3.4–5.3)
PROT SERPL-MCNC: 7.6 G/DL (ref 6.8–8.8)
PSA SERPL-MCNC: 2.07 UG/L (ref 0–4)
RBC # BLD AUTO: 5.15 10E6/UL (ref 4.4–5.9)
SODIUM SERPL-SCNC: 139 MMOL/L (ref 133–144)
TSH SERPL DL<=0.005 MIU/L-ACNC: 0.83 MU/L (ref 0.4–4)
WBC # BLD AUTO: 11.9 10E3/UL (ref 4–11)

## 2022-03-18 PROCEDURE — 87389 HIV-1 AG W/HIV-1&-2 AB AG IA: CPT | Performed by: FAMILY MEDICINE

## 2022-03-18 PROCEDURE — G0103 PSA SCREENING: HCPCS | Performed by: FAMILY MEDICINE

## 2022-03-18 PROCEDURE — 36415 COLL VENOUS BLD VENIPUNCTURE: CPT | Performed by: FAMILY MEDICINE

## 2022-03-18 PROCEDURE — 99214 OFFICE O/P EST MOD 30 MIN: CPT | Performed by: FAMILY MEDICINE

## 2022-03-18 PROCEDURE — 80050 GENERAL HEALTH PANEL: CPT | Performed by: FAMILY MEDICINE

## 2022-03-18 ASSESSMENT — PAIN SCALES - GENERAL: PAINLEVEL: NO PAIN (0)

## 2022-03-18 NOTE — PATIENT INSTRUCTIONS
Stay well hydrated    We will send you results    Return the stool tests    Call to schedule scope exams

## 2022-03-18 NOTE — PROGRESS NOTES
"       Mel Natarajan is a 61 year old who presents for the following health issues     History of Present Illness       Reason for visit:  See below  Symptom onset:  More than a month  Symptoms include:  Diarrhea with some weightloss  Symptom intensity:  Moderate  Symptom progression:  Staying the same  Had these symptoms before:  No  What makes it worse:  No  What makes it better:  No    He eats 0-1 servings of fruits and vegetables daily.He consumes 1 sweetened beverage(s) daily.He exercises with enough effort to increase his heart rate 30 to 60 minutes per day.  He exercises with enough effort to increase his heart rate 6 days per week.   He is taking medications regularly.        Review of Systems    still taking lyrica for nerve pain 150 mg tid , decreased last summer from 300 tid    Facial  Nerve pain    Lower dose controlling pain okay    Diarrhea variable    Some  Liquid stool, sometimes soft    Prone to diarrhea  Most of life but never this  Bad    No bloody or black stools    Eating  Same amount but healthier    More fruits and veggies    Patient is a drummer    Practicing more drumming    Some hiking in summer    Lots of walking at work    More  Water intake    This is about the wt he was at before starting lyrica about 2010ish    No new pain    No vomiting    Up to 3 x daily has diarrhea    Trying to cut down on smoking    No fever/ chillls      Objective    /89 (BP Location: Left arm, Patient Position: Chair, Cuff Size: Adult Regular)   Pulse 94   Temp 97.9  F (36.6  C) (Temporal)   Ht 1.778 m (5' 10\")   Wt 77.1 kg (170 lb)   BMI 24.39 kg/m    Body mass index is 24.39 kg/m .  Physical Exam  Constitutional:       Appearance: He is well-developed.   HENT:      Head: Normocephalic and atraumatic.   Eyes:      Conjunctiva/sclera: Conjunctivae normal.   Neck:      Vascular: No carotid bruit.   Cardiovascular:      Rate and Rhythm: Normal rate and regular rhythm.      Heart sounds: Normal " heart sounds.   Pulmonary:      Effort: Pulmonary effort is normal. No respiratory distress.      Breath sounds: Normal breath sounds.   Abdominal:      General: There is no distension.      Palpations: Abdomen is soft.      Tenderness: There is no abdominal tenderness.   Neurological:      Mental Status: He is alert and oriented to person, place, and time.      Cranial Nerves: No cranial nerve deficit.   Psychiatric:         Speech: Speech normal.         Behavior: Behavior normal.              ASSESSMENT / PLAN:  (R19.7) Diarrhea, unspecified type  (primary encounter diagnosis)  Comment: prudent to check labs and stool tests.  No antibiotics in the last year   Plan: CBC with Platelets & Differential,         Comprehensive metabolic panel, TSH with free T4        reflex, Enteric Bacteria and Virus Panel by JASON        Stool, Ova and Parasite Exam Routine             (R63.4) Weight loss  Comment: patient due for colonoscopy and with wt loss/ diarrhea prudent to do egd also   Plan: Adult Gastro Ref - Procedure Only       Patient to call and schedule     (Z11.4) Screening for HIV (human immunodeficiency virus)  Comment: check   Plan: HIV Antigen Antibody Combo             (Z12.5) Screening for prostate cancer  Comment: psa   Plan: Prostate Specific Antigen Screen             (Z12.11) Screen for colon cancer  Comment: colonoscopy   Plan: Adult Gastro Ref - Procedure Only             (Z72.0) Tobacco abuse  Comment: discussed   Plan: advised complete cessation    Of note patient just had ct chest done      I reviewed the patient's medications, allergies, medical history, family history, and social history.    David Morse MD

## 2022-03-19 NOTE — RESULT ENCOUNTER NOTE
"Glucose mildly elevated.  Last summer the overall diabetes test (hemoglobin a1c ) was in the \"prediabetes\" range.  No medications needed for this.  Just keep working on healthy diet/exercise as you are able.    White blood count borderline.  This is very nonspecific.  Be sure to return the stool tests.    Other labs here are okay.    David Morse MD      "

## 2022-03-20 ENCOUNTER — APPOINTMENT (OUTPATIENT)
Dept: LAB | Facility: CLINIC | Age: 62
End: 2022-03-20
Payer: COMMERCIAL

## 2022-03-20 PROCEDURE — 87209 SMEAR COMPLEX STAIN: CPT | Performed by: FAMILY MEDICINE

## 2022-03-20 PROCEDURE — 87506 IADNA-DNA/RNA PROBE TQ 6-11: CPT | Performed by: FAMILY MEDICINE

## 2022-03-20 PROCEDURE — 87177 OVA AND PARASITES SMEARS: CPT | Performed by: FAMILY MEDICINE

## 2022-03-21 ENCOUNTER — TELEPHONE (OUTPATIENT)
Dept: GASTROENTEROLOGY | Facility: CLINIC | Age: 62
End: 2022-03-21
Payer: COMMERCIAL

## 2022-03-21 DIAGNOSIS — Z11.59 ENCOUNTER FOR SCREENING FOR OTHER VIRAL DISEASES: Primary | ICD-10-CM

## 2022-03-21 LAB
C COLI+JEJUNI+LARI FUSA STL QL NAA+PROBE: NOT DETECTED
EC STX1 GENE STL QL NAA+PROBE: NOT DETECTED
EC STX2 GENE STL QL NAA+PROBE: NOT DETECTED
NOROV GI+II ORF1-ORF2 JNC STL QL NAA+PR: NOT DETECTED
O+P STL MICRO: NEGATIVE
RVA NSP5 STL QL NAA+PROBE: NOT DETECTED
SALMONELLA SP RPOD STL QL NAA+PROBE: NOT DETECTED
SHIGELLA SP+EIEC IPAH STL QL NAA+PROBE: NOT DETECTED
V CHOL+PARA RFBL+TRKH+TNAA STL QL NAA+PR: NOT DETECTED
Y ENTERO RECN STL QL NAA+PROBE: NOT DETECTED

## 2022-03-21 NOTE — TELEPHONE ENCOUNTER
Screening Questions  BlueKIND OF PREP RedLOCATION [review exclusion criteria] GreenSEDATION TYPE  1. Have you had a positive covid test in the last 90 days? N     2. Are you active on mychart? Y    3. What insurance is in the chart? MEDICA CHOICE      3.   Ordering/Referring Provider: DR. JORDAN     4. BMI 24.4 [BMI OVER 40-EXTENDED PREP]  If greater than 40 review exclusion criteria [PAC APPT IF @ [facility]]        5.  Respiratory Screening :  [If yes to any of the following HOSPITAL setting only]     Do you use daily home oxygen? N    Do you have mod to severe Obstructive Sleep Apnea? N  [OKAY @ [facility] SH PH RI]   Do you have Pulmonary Hypertension? N     Do you have UNCONTROLLED asthma? N        6.   Have you had a heart or lung transplant? N      7.   Are you currently on dialysis? N [ If yes, G-PREP & HOSPITAL setting only]     8.   Do you have chronic kidney disease? N [ If yes, G-PREP ]    9.   Have you had a stroke or Transient ischemic attack (TIA - aka  mini stroke ) within 6 months?  N (If yes, please review exclusion criteria)    10.   In the past 6 months, have you had any heart related issues including cardiomyopathy or heart attack? N           If yes, did it require cardiac stenting or other implantable device? N      11.   Do you have any implantable devices in your body (pacemaker, defib, LVAD)? N (If yes, please review exclusion criteria)    12.   Do you take nitroglycerin? N           If yes, how often? N  (if yes, HOSPITAL setting ONLY)    13.   Are you currently taking any blood thinners? N           [IF YES, INFORM PATIENT TO FOLLOW UP W/ ORDERING PROVIDER FOR BRIDGING INSTRUCTIONS]     14.   Do you have a diagnosis of diabetes? N   [ If yes, G-PREP ]    15.   [FEMALES] Are you currently pregnant? NA    If yes, how many weeks? NA    16.   Are you taking any prescription pain medications on a routine schedule?  N  [ If yes, EXTENDED PREP.] [If yes, MAC]    17.   Do you have any chemical  dependencies such as alcohol, street drugs, or methadone?  N [If yes, MAC]    18.   Do you have any history of post-traumatic stress syndrome, severe anxiety or history of psychosis?  N  [If yes, MAC]    19.   Do you have a significant intellectual disability?  N [If yes, MAC]    20.   Do you transfer independently?  Y    21.  On a regular basis do you go 3-5 days between bowel movements? N   [ If yes, EXTENDED PREP.]    22.   Preferred LOCAL Pharmacy for Pre Prescription     [id number] - [place], [place] - [address] [place] AT [facility] [address]  [facility] - [place], [place] - [address]  WRITTEN PRESCRIPTION REQUESTED  [facility] #[id number] - [place], [place] - [address] AT  [address] &      Scheduling Details      Caller : KURT  (Please ask for phone number if not scheduled by patient)    Type of Procedure Scheduled: EGD. COLON  Which Colonoscopy Prep was Sent?: ALLY LYN CF PATIENTS & GROSURI'S PATIENTS NEEDS EXTENDED PREP  Surgeon: ALEJANDRINA   Date of Procedure: 4/29  Location: [facility]      Sedation Type: CS  Conscious Sedation- Needs  for 6 hours after the procedure  MAC/General-Needs  for 24 hours after procedure    Pre-op Required at [facility] [place], [place] and OR for MAC sedation: N  (advise patient they will need a pre-op prior to procedure -)      Informed patient they will need an adult  Y  Cannot take any type of public or medical transportation alone    Pre-Procedure Covid test to be completed at [facility] or Externally: Y    Confirmed Nurse will call to complete assessment Y    Additional comments: N

## 2022-04-04 ENCOUNTER — MYC MEDICAL ADVICE (OUTPATIENT)
Dept: FAMILY MEDICINE | Facility: CLINIC | Age: 62
End: 2022-04-04
Payer: COMMERCIAL

## 2022-04-04 ENCOUNTER — E-VISIT (OUTPATIENT)
Dept: FAMILY MEDICINE | Facility: CLINIC | Age: 62
End: 2022-04-04
Payer: COMMERCIAL

## 2022-04-04 DIAGNOSIS — R53.83 FATIGUE, UNSPECIFIED TYPE: Primary | ICD-10-CM

## 2022-04-04 DIAGNOSIS — R73.01 IMPAIRED FASTING GLUCOSE: ICD-10-CM

## 2022-04-04 PROCEDURE — 99421 OL DIG E/M SVC 5-10 MIN: CPT | Performed by: FAMILY MEDICINE

## 2022-04-04 NOTE — TELEPHONE ENCOUNTER
I called patient and discussed in detail with patient  He stopped a particular food product from [employer]  Chocolate covered artificail juarez    Was eating 9-10 at night but stopped the diarrhea  Wt stable now    Patient will do the scope exams as planned.     Do a lab visit to recheck glucose, also add hemoglobin a1c and testosterone.    David Morse MD        Provider E-Visit time total (minutes): 10  David Morse MD

## 2022-04-12 ENCOUNTER — LAB (OUTPATIENT)
Dept: LAB | Facility: CLINIC | Age: 62
End: 2022-04-12
Payer: COMMERCIAL

## 2022-04-12 DIAGNOSIS — R73.01 IMPAIRED FASTING GLUCOSE: ICD-10-CM

## 2022-04-12 DIAGNOSIS — R53.83 FATIGUE, UNSPECIFIED TYPE: ICD-10-CM

## 2022-04-12 LAB
FASTING STATUS PATIENT QL REPORTED: YES
GLUCOSE BLD-MCNC: 111 MG/DL (ref 70–99)
HBA1C MFR BLD: 6.1 % (ref 0–5.6)

## 2022-04-12 PROCEDURE — 36415 COLL VENOUS BLD VENIPUNCTURE: CPT

## 2022-04-12 PROCEDURE — 84403 ASSAY OF TOTAL TESTOSTERONE: CPT

## 2022-04-12 PROCEDURE — 82947 ASSAY GLUCOSE BLOOD QUANT: CPT

## 2022-04-12 PROCEDURE — 83036 HEMOGLOBIN GLYCOSYLATED A1C: CPT

## 2022-04-14 LAB — TESTOST SERPL-MCNC: 706 NG/DL (ref 240–950)

## 2022-04-15 NOTE — RESULT ENCOUNTER NOTE
"Testosterone level is fine.    The overall blood sugar test ( hemoglobin a1c ) is in the \"prediabetes\" range.  No medication needed for this; just keep working on healthy diet/exercise.    David Morse MD  "

## 2022-04-22 DIAGNOSIS — R06.2 WHEEZING: ICD-10-CM

## 2022-04-22 DIAGNOSIS — R06.02 SOB (SHORTNESS OF BREATH): ICD-10-CM

## 2022-04-24 RX ORDER — ALBUTEROL SULFATE 90 UG/1
AEROSOL, METERED RESPIRATORY (INHALATION)
Qty: 25.5 G | Refills: 1 | Status: SHIPPED | OUTPATIENT
Start: 2022-04-24 | End: 2022-07-11

## 2022-04-24 NOTE — TELEPHONE ENCOUNTER
Prescription approved per [facility] Refill Protocol.    Cherise Painter RN

## 2022-04-25 ENCOUNTER — LAB (OUTPATIENT)
Dept: LAB | Facility: CLINIC | Age: 62
End: 2022-04-25
Payer: COMMERCIAL

## 2022-04-25 DIAGNOSIS — Z11.59 ENCOUNTER FOR SCREENING FOR OTHER VIRAL DISEASES: ICD-10-CM

## 2022-04-25 PROCEDURE — U0005 INFEC AGEN DETEC AMPLI PROBE: HCPCS

## 2022-04-25 PROCEDURE — U0003 INFECTIOUS AGENT DETECTION BY NUCLEIC ACID (DNA OR RNA); SEVERE ACUTE RESPIRATORY SYNDROME CORONAVIRUS 2 (SARS-COV-2) (CORONAVIRUS DISEASE [COVID-19]), AMPLIFIED PROBE TECHNIQUE, MAKING USE OF HIGH THROUGHPUT TECHNOLOGIES AS DESCRIBED BY CMS-2020-01-R: HCPCS

## 2022-04-26 LAB — SARS-COV-2 RNA RESP QL NAA+PROBE: NEGATIVE

## 2022-04-29 ENCOUNTER — HOSPITAL ENCOUNTER (OUTPATIENT)
Facility: AMBULATORY SURGERY CENTER | Age: 62
Discharge: HOME | End: 2022-04-29
Attending: INTERNAL MEDICINE | Admitting: INTERNAL MEDICINE
Payer: COMMERCIAL

## 2022-04-29 VITALS
OXYGEN SATURATION: 95 % | HEART RATE: 75 BPM | TEMPERATURE: 98.3 F | DIASTOLIC BLOOD PRESSURE: 78 MMHG | RESPIRATION RATE: 16 BRPM | SYSTOLIC BLOOD PRESSURE: 124 MMHG

## 2022-04-29 LAB
COLONOSCOPY: NORMAL
UPPER GI ENDOSCOPY: NORMAL

## 2022-04-29 PROCEDURE — 45380 COLONOSCOPY AND BIOPSY: CPT

## 2022-04-29 PROCEDURE — G8918 PT W/O PREOP ORDER IV AB PRO: HCPCS

## 2022-04-29 PROCEDURE — 43239 EGD BIOPSY SINGLE/MULTIPLE: CPT

## 2022-04-29 PROCEDURE — G8907 PT DOC NO EVENTS ON DISCHARG: HCPCS

## 2022-04-29 PROCEDURE — 88305 TISSUE EXAM BY PATHOLOGIST: CPT | Performed by: PATHOLOGY

## 2022-04-29 RX ORDER — FENTANYL CITRATE 50 UG/ML
INJECTION, SOLUTION INTRAMUSCULAR; INTRAVENOUS PRN
Status: DISCONTINUED | OUTPATIENT
Start: 2022-04-29 | End: 2022-04-29 | Stop reason: HOSPADM

## 2022-04-29 NOTE — H&P
[facility] Anesthesia Pre-op History and Physical    Alexey Reed MRN# [id number]   Age: 61 year old YOB: 1960            Date of Exam 4/29/2022         Primary care provider: David Morse         Chief Complaint and/or Reason for Procedure:     Unintentional weight loss - now improving          Active problem list:     Patient Active Problem List    Diagnosis Date Noted     Pulmonary nodules 08/18/2020     Priority: Medium     6 mm pulmonary nodule seen on chest CT dated 8/2020 recommend repeat chest CT scanning in 6 to 12 months~02/2021       Hypertension goal BP (blood pressure) < 140/90 09/25/2017     Priority: Medium     FH: prostate cancer 08/16/2016     Priority: Medium     Chronic pain disorder 10/07/2014     Priority: Medium     Neuropathic post trauma.procedure left neck  Patient is followed by PRAVEENA TUCKER for ongoing prescription of narcotic pain medicine.  Med: Norco 5/325.   Maximum use per month: 120  Expected duration: lifelong  Narcotic agreement on file: YES  Clinic visit recommended: q 12 months           Anxiety 09/01/2014     Priority: Medium     Hyperlipidemia with target LDL less than 130 09/01/2014     Priority: Medium     Diagnosis updated by automated process. Provider to review and confirm.       AC joint arthropathy 10/14/2013     Priority: Medium     Tenosynovitis of thumb 05/31/2012     Priority: Medium     HYPERLIPIDEMIA LDL GOAL <130 10/31/2010     Priority: Medium     Neuropathic pain syndrome (non-herpetic)      Priority: Medium     Residual after post traumatic left neck surgery at the [facility]   Controlled with Lyrica and Vicodin 3-4/day  Patient is followed by PRAVEENA TUCKER for ongoing prescription of narcotic pain medicine.  Med: Norco 5/325.   Maximum use per month: 120  Expected duration: lifelogn  Narcotic agreement on file: YES  Clinic visit recommended: q 12 months              Medications (include herbals and vitamins):   Any Plavix use  in the last 7 days? No     Current Outpatient Medications   Medication Sig     albuterol (PROAIR HFA/PROVENTIL HFA/VENTOLIN HFA) 108 (90 Base) MCG/ACT inhaler INHALE 2 PUFFS INTO THE LUNGS EVERY 6 HOURS AS NEEDED FOR SHORTNESS OF BREATH OR DIFFICULT BREATHING OR WHEEZING     aspirin 81 MG EC tablet Take 81 mg by mouth daily     losartan (COZAAR) 100 MG tablet Take 1 tablet (100 mg) by mouth daily     MULTI-VITAMIN OR TABS 1 TABLET DAILY     pregabalin (LYRICA) 150 MG capsule TAKE 1 CAPSULE BY MOUTH THREE TIMES DAILY     PROAIR RESPICLICK 108 (90 Base) MCG/ACT inhaler INHALE 2 PUFFS INTO THE LUNGS EVERY 6 HOURS AS NEEDED FOR SHORTNESS OF BREATH OR DIFFICULT BREATHING OR WHEEZING     sertraline (ZOLOFT) 100 MG tablet Take 1 tablet (100 mg) by mouth daily     simvastatin (ZOCOR) 40 MG tablet Take 1 tablet (40 mg) by mouth At Bedtime     Current Facility-Administered Medications   Medication     fentaNYL (PF) (SUBLIMAZE) injection     midazolam (VERSED) injection     sodium chloride (PF) 0.9% PF flush             Allergies:      Allergies   Allergen Reactions     Ace Inhibitors Cough     Allergy to Latex? No  Allergy to tape?   No  Intolerances:             Physical Exam:   All vitals have been reviewed  Patient Vitals for the past 8 hrs:   BP Temp Temp src Pulse Resp SpO2   04/29/22 1015 103/68 -- -- -- 16 96 %   04/29/22 1010 110/77 -- -- -- 16 96 %   04/29/22 1004 110/77 -- -- 80 16 100 %   04/29/22 0927 125/79 98.3  F (36.8  C) Temporal 99 18 --     No intake/output data recorded.  Lungs:   No increased work of breathing, good air exchange, clear to auscultation bilaterally, no crackles or wheezing     Cardiovascular:   normal S1 and S2             Lab / Radiology Results:            Anesthetic risk and/or ASA classification:   2    Betty Ray DO

## 2022-05-03 LAB
PATH REPORT.COMMENTS IMP SPEC: NORMAL
PATH REPORT.COMMENTS IMP SPEC: NORMAL
PATH REPORT.FINAL DX SPEC: NORMAL
PATH REPORT.GROSS SPEC: NORMAL
PATH REPORT.MICROSCOPIC SPEC OTHER STN: NORMAL
PATH REPORT.RELEVANT HX SPEC: NORMAL
PHOTO IMAGE: NORMAL

## 2022-06-19 DIAGNOSIS — I10 HYPERTENSION GOAL BP (BLOOD PRESSURE) < 140/90: ICD-10-CM

## 2022-06-19 DIAGNOSIS — M79.2 NEUROPATHIC PAIN SYNDROME (NON-HERPETIC): ICD-10-CM

## 2022-06-19 DIAGNOSIS — E78.5 HYPERLIPIDEMIA LDL GOAL <130: ICD-10-CM

## 2022-06-20 RX ORDER — PREGABALIN 150 MG/1
CAPSULE ORAL
Qty: 270 CAPSULE | Refills: 0 | Status: SHIPPED | OUTPATIENT
Start: 2022-06-20 | End: 2022-09-12

## 2022-06-20 RX ORDER — LOSARTAN POTASSIUM 100 MG/1
TABLET ORAL
Qty: 90 TABLET | Refills: 3 | Status: SHIPPED | OUTPATIENT
Start: 2022-06-20 | End: 2023-06-19

## 2022-06-20 RX ORDER — SIMVASTATIN 40 MG
TABLET ORAL
Qty: 90 TABLET | Refills: 0 | Status: SHIPPED | OUTPATIENT
Start: 2022-06-20 | End: 2022-09-12

## 2022-06-20 RX ORDER — SERTRALINE HYDROCHLORIDE 100 MG/1
TABLET, FILM COATED ORAL
Qty: 90 TABLET | Refills: 3 | Status: SHIPPED | OUTPATIENT
Start: 2022-06-20 | End: 2023-11-03

## 2022-07-10 DIAGNOSIS — R06.02 SOB (SHORTNESS OF BREATH): ICD-10-CM

## 2022-07-10 DIAGNOSIS — R06.2 WHEEZING: ICD-10-CM

## 2022-07-11 RX ORDER — ALBUTEROL SULFATE 90 UG/1
AEROSOL, METERED RESPIRATORY (INHALATION)
Qty: 25.5 G | Refills: 1 | Status: SHIPPED | OUTPATIENT
Start: 2022-07-11 | End: 2023-11-03

## 2022-07-11 NOTE — TELEPHONE ENCOUNTER
"Requested Prescriptions   Signed Prescriptions Disp Refills    albuterol (PROAIR HFA/PROVENTIL HFA/VENTOLIN HFA) 108 (90 Base) MCG/ACT inhaler 25.5 g 1     Sig: INHALE 2 PUFFS INTO THE LUNGS EVERY 6 HOURS AS NEEDED FOR SHORTNESS OF BREATH OR DIFFICULT BREATHING OR WHEEZING       Asthma Maintenance Inhalers - Anticholinergics Passed - 7/10/2022 12:48 PM        Passed - Patient is age 12 years or older        Passed - Recent (12 mo) or future (30 days) visit within the authorizing provider's specialty     Patient has had an office visit with the authorizing provider or a provider within the authorizing providers department within the previous 12 mos or has a future within next 30 days. See \"Patient Info\" tab in inbasket, or \"Choose Columns\" in Meds & Orders section of the refill encounter.              Passed - Medication is active on med list       Short-Acting Beta Agonist Inhalers Protocol  Passed - 7/10/2022 12:48 PM        Passed - Patient is age 12 or older        Passed - Recent (12 mo) or future (30 days) visit within the authorizing provider's specialty     Patient has had an office visit with the authorizing provider or a provider within the authorizing providers department within the previous 12 mos or has a future within next 30 days. See \"Patient Info\" tab in inbasket, or \"Choose Columns\" in Meds & Orders section of the refill encounter.              Passed - Medication is active on med list           Krystyna Smith RN  [facility]     "

## 2022-08-20 ENCOUNTER — HEALTH MAINTENANCE LETTER (OUTPATIENT)
Age: 62
End: 2022-08-20

## 2022-09-12 DIAGNOSIS — M79.2 NEUROPATHIC PAIN SYNDROME (NON-HERPETIC): ICD-10-CM

## 2022-09-12 DIAGNOSIS — E78.5 HYPERLIPIDEMIA LDL GOAL <130: ICD-10-CM

## 2022-09-12 RX ORDER — SIMVASTATIN 40 MG
TABLET ORAL
Qty: 90 TABLET | Refills: 0 | Status: SHIPPED | OUTPATIENT
Start: 2022-09-12 | End: 2022-12-12

## 2022-09-12 RX ORDER — PREGABALIN 150 MG/1
CAPSULE ORAL
Qty: 270 CAPSULE | Refills: 0 | Status: SHIPPED | OUTPATIENT
Start: 2022-09-12 | End: 2022-12-12

## 2022-11-19 ENCOUNTER — HEALTH MAINTENANCE LETTER (OUTPATIENT)
Age: 62
End: 2022-11-19

## 2022-12-12 DIAGNOSIS — M79.2 NEUROPATHIC PAIN SYNDROME (NON-HERPETIC): ICD-10-CM

## 2022-12-12 DIAGNOSIS — E78.5 HYPERLIPIDEMIA LDL GOAL <130: ICD-10-CM

## 2022-12-12 RX ORDER — PREGABALIN 150 MG/1
CAPSULE ORAL
Qty: 90 CAPSULE | Refills: 0 | Status: SHIPPED | OUTPATIENT
Start: 2022-12-12 | End: 2023-10-25

## 2022-12-12 RX ORDER — SIMVASTATIN 40 MG
TABLET ORAL
Qty: 30 TABLET | Refills: 0 | Status: SHIPPED | OUTPATIENT
Start: 2022-12-12 | End: 2023-11-03

## 2022-12-13 NOTE — TELEPHONE ENCOUNTER
I called and notified patient of message below. He declined scheduling a visit at this time and will call back after the 1st of the year to schedule.  Leilani Camargo CMA

## 2023-06-19 ENCOUNTER — ANCILLARY PROCEDURE (OUTPATIENT)
Dept: GENERAL RADIOLOGY | Facility: CLINIC | Age: 63
End: 2023-06-19
Attending: PHYSICIAN ASSISTANT
Payer: COMMERCIAL

## 2023-06-19 ENCOUNTER — OFFICE VISIT (OUTPATIENT)
Dept: URGENT CARE | Facility: URGENT CARE | Age: 63
End: 2023-06-19
Payer: COMMERCIAL

## 2023-06-19 VITALS
DIASTOLIC BLOOD PRESSURE: 83 MMHG | BODY MASS INDEX: 22.57 KG/M2 | WEIGHT: 157.3 LBS | TEMPERATURE: 98.5 F | RESPIRATION RATE: 18 BRPM | HEART RATE: 114 BPM | OXYGEN SATURATION: 92 % | SYSTOLIC BLOOD PRESSURE: 131 MMHG

## 2023-06-19 DIAGNOSIS — R05.1 ACUTE COUGH: ICD-10-CM

## 2023-06-19 DIAGNOSIS — I10 HYPERTENSION GOAL BP (BLOOD PRESSURE) < 140/90: ICD-10-CM

## 2023-06-19 DIAGNOSIS — J44.1 COPD EXACERBATION (H): Primary | ICD-10-CM

## 2023-06-19 PROCEDURE — 71046 X-RAY EXAM CHEST 2 VIEWS: CPT | Mod: TC | Performed by: RADIOLOGY

## 2023-06-19 PROCEDURE — 99214 OFFICE O/P EST MOD 30 MIN: CPT | Performed by: PHYSICIAN ASSISTANT

## 2023-06-19 RX ORDER — ALBUTEROL SULFATE 90 UG/1
2 AEROSOL, METERED RESPIRATORY (INHALATION) EVERY 4 HOURS PRN
Qty: 18 G | Refills: 1 | Status: SHIPPED | OUTPATIENT
Start: 2023-06-19 | End: 2023-11-03

## 2023-06-19 RX ORDER — TIOTROPIUM BROMIDE 18 UG/1
CAPSULE ORAL; RESPIRATORY (INHALATION)
COMMUNITY
Start: 2023-06-06 | End: 2023-11-03

## 2023-06-19 RX ORDER — LOSARTAN POTASSIUM 100 MG/1
100 TABLET ORAL DAILY
Qty: 90 TABLET | Refills: 0 | Status: SHIPPED | OUTPATIENT
Start: 2023-06-19 | End: 2023-11-03

## 2023-06-19 RX ORDER — AZITHROMYCIN 250 MG/1
TABLET, FILM COATED ORAL
Qty: 6 TABLET | Refills: 0 | Status: SHIPPED | OUTPATIENT
Start: 2023-06-19 | End: 2023-06-24

## 2023-06-19 RX ORDER — CODEINE PHOSPHATE AND GUAIFENESIN 10; 100 MG/5ML; MG/5ML
1 SOLUTION ORAL EVERY 4 HOURS PRN
Qty: 118 ML | Refills: 0 | Status: SHIPPED | OUTPATIENT
Start: 2023-06-19 | End: 2023-10-25

## 2023-06-19 NOTE — PROGRESS NOTES
Assessment & Plan     COPD exacerbation (H)  - guaiFENesin-codeine (ROBITUSSIN AC) 100-10 MG/5ML solution; Take 5 mLs by mouth every 4 hours as needed for cough  - albuterol (PROAIR HFA/PROVENTIL HFA/VENTOLIN HFA) 108 (90 Base) MCG/ACT inhaler; Inhale 2 puffs into the lungs every 4 hours as needed for shortness of breath or wheezing  - azithromycin (ZITHROMAX) 250 MG tablet; Take 2 tablets (500 mg) by mouth daily for 1 day, THEN 1 tablet (250 mg) daily for 4 days.    Acute cough  - XR Chest 2 Views; Future    Hypertension goal BP (blood pressure) < 140/90  - losartan (COZAAR) 100 MG tablet; Take 1 tablet (100 mg) by mouth daily    CXR without sign of acute infection. Mild bilateral pleural effusions unchanged from April CT. Treat with azithromycin and albuterol. Follow up with pulmonologist in 1 month as previously scheduled. Losartan refilled.       Return in about 1 week (around 6/26/2023) for visit with primary care provider if not improving, sooner if shortness of breath worsens.     Mayte Taylor PA-C  [facility]    Subjective   Alexey Reed is a 62 year old who presents for the following health issues     Patient presents with:  Cough: Severe congestion for 1 week.     ROBINSON Natarajan presents clinic for evaluation of upper respiratory infection.  He states that in April, 2 months ago, he was hospitalized with fluid in his lungs.  He was diagnosed with pneumonia and a pleural effusion was drained bilaterally.  2 weeks ago, he was exposed to a friend with upper respiratory infection.  He began coughing.  His cough is coming from his central chest.  He has to rest more often secondary to shortness of breath.  He uses Spiriva twice daily. He is out of blood pressure meds and has not seen his primary care provider for a refill.     Review of Systems   ROS negative except as stated above.      Objective    /83   Pulse 114   Temp 98.5  F (36.9  C) (Tympanic)   Resp 18   Wt 71.4  kg (157 lb 4.8 oz)   SpO2 92%   BMI 22.57 kg/m    Physical Exam   GENERAL: healthy, alert and no distress  EYES: Eyes grossly normal to inspection, PERRL and conjunctivae and sclerae normal  HENT: ear canals and TM's normal, nose and mouth without ulcers or lesions  NECK: no adenopathy, no asymmetry, masses, or scars and thyroid normal to palpation  RESP: lungs clear to auscultation - no rales, rhonchi or wheezes  CV: regular rate and rhythm, normal S1 S2, no S3 or S4, no murmur, click or rub, no peripheral edema and peripheral pulses strong    No results found for any visits on 06/19/23.

## 2023-06-20 NOTE — PATIENT INSTRUCTIONS
Azithromycin 250 mg as directed- 2 pills together at the same time today then 1 pill a day for the next 4 days.  Robitussin with codeine as needed for cough before sleep, up to every 4-6 hours. DO NOT take before driving a vehicle.  Albuterol 2 puffs every 4 hours as needed for shortness of breath.    Rest! Your body needs more rest to heal.  Drink plenty of fluids (warm fluids like tea or soup are soothing and reduce cough)  Sit in the bathroom with a hot shower running and breathe in the steam.  Honey may soothe your sore throat and help manage your cough- may take straight or in warm water with lemon juice.  Avoid smoke (cigarettes, bonfires, fireplace, wood burning stoves).  Take Tylenol or an NSAID such as ibuprofen or naproxen as needed for pain.  Delsym (dextromethorphan polistirex) is an over the counter cough medication that lasts 12 hours.   Mucinex or Robitussin (guiafenesin) thin mucus and may help it to loosen more quickly  Good handwashing is the best way to prevent spread of germs  Present to emergency room if you develop trouble breathing, swallowing or cough-up blood.  Follow up with your primary care provider if symptoms worsen or fail to improve as expected.

## 2023-07-10 ENCOUNTER — TELEPHONE (OUTPATIENT)
Dept: FAMILY MEDICINE | Facility: CLINIC | Age: 63
End: 2023-07-10
Payer: COMMERCIAL

## 2023-07-10 DIAGNOSIS — Z86.0100 HISTORY OF COLON POLYPS: Primary | ICD-10-CM

## 2023-07-10 NOTE — TELEPHONE ENCOUNTER
----- Message from Jeanne Jaime RN sent at 7/10/2023  1:42 PM CDT -----  Regarding: Colon polyp surveillance?  Hi Dr. Morse,    I realize you have not seen Delgado in a little while, however, was wondering if you'd approve of my ordering a colon screening for him. I am on the Colorectal Cancer Screening Specialty Team and see that in 2019 the endoscopist removed multiple adenomatous polyps and recommended a 3 year recall. In 2022 he then had a diagnostic colonoscopy with an adequate prep and no recommendations made for follow-up given it was diagnostic.  Are you ok if I order a screening under your name given he should be screened for polyps, or if not, what do you recommend for an interval for when he should return for colon screening/surveillance?    Thank you in advance!  Jeanne Jaime, Lead RN Colorectal Cancer   Division of Gastroenterology at [facility]

## 2023-09-10 ENCOUNTER — HEALTH MAINTENANCE LETTER (OUTPATIENT)
Age: 63
End: 2023-09-10

## 2023-09-14 ENCOUNTER — HOSPITAL ENCOUNTER (OUTPATIENT)
Facility: CLINIC | Age: 63
End: 2023-09-14
Attending: INTERNAL MEDICINE | Admitting: INTERNAL MEDICINE
Payer: COMMERCIAL

## 2023-09-14 ENCOUNTER — TELEPHONE (OUTPATIENT)
Dept: GASTROENTEROLOGY | Facility: CLINIC | Age: 63
End: 2023-09-14
Payer: COMMERCIAL

## 2023-09-14 NOTE — TELEPHONE ENCOUNTER
"Endoscopy Scheduling Screen    Have you had a positive Covid test in the last 14 days?  No    Are you active on MyChart?   Yes    What insurance is in the chart?  Other:  [employer]    Ordering/Referring Provider:     JOHANNA JORDAN      (If ordering provider performs procedure, schedule with ordering provider unless otherwise instructed. )    BMI: Estimated body mass index is 22.57 kg/m  as calculated from the following:    Height as of 3/18/22: 1.778 m (5' 10\").    Weight as of 6/19/23: 71.4 kg (157 lb 4.8 oz).     Sedation Ordered  moderate sedation.   If patient BMI > 50 do not schedule in [facility].    If patient BMI > 45 do not schedule at [facility].    Are you taking methadone or Suboxone?  No    Are you taking any prescription medications for pain 3 or more times per week?   No    Do you have a history of malignant hyperthermia or adverse reaction to anesthesia?  No    (Females) Are you currently pregnant?        Have you been diagnosed or told you have pulmonary hypertension?   No    Do you have an LVAD?  No    Have you been told you have moderate to severe sleep apnea?  No    Have you been told you have COPD, asthma, or any other lung disease?  Yes     What breathing problems do you have?  CRONIC BRONCITIS      Do you use home oxygen?  No    Have your breathing problems required an ED visit or hospitalization in the last year?  Yes (RN Review required for scheduling unless scheduling in Hospital.)    Do you have any heart conditions?  No     Have you ever had an organ transplant?   No    Have you ever had or are you awaiting a heart or lung transplant?   No    Have you had a stroke or transient ischemic attack (TIA aka \"mini stroke\" in the last 6 months?   No    Have you been diagnosed with or been told you have cirrhosis of the liver?   No    Are you currently on dialysis?   No    Do you need assistance transferring?   No    BMI: Estimated body mass index is 22.57 kg/m  as calculated from the following:    Height as " "of 3/18/22: 1.778 m (5' 10\").    Weight as of 6/19/23: 71.4 kg (157 lb 4.8 oz).     Is patients BMI > 40 and scheduling location [facility]?  No    Do you take an injectable medication for weight loss or diabetes (excluding insulin)?  No    Do you take the medication Naltrexone?  No    Do you take blood thinners?  No       Prep   Are you currently on dialysis or do you have chronic kidney disease?  No    Do you have a diagnosis of diabetes?  No    Do you have a diagnosis of cystic fibrosis (CF)?  No    On a regular basis do you go 3 -5 days between bowel movements?  No    BMI > 40?  No    Preferred Pharmacy:    [id number] - [place], [place] - [address] [place] [facility] [address] [place] [address]  Phone: [phone] Fax: [phone]      Final Scheduling Details   Colonoscopy prep sent?  Standard MiraLAX    Procedure scheduled  Colonoscopy    Surgeon:  MIKI      Date of procedure:  12/8      Pre-OP / PAC:   No - Not required for this site.    Location  [facility] - Per exclusion criteria.    Sedation   Moderate Sedation - Per order.      Patient Reminders:   You will receive a call from a Nurse to review instructions and health history.  This assessment must be completed prior to your procedure.  Failure to complete the Nurse assessment may result in the procedure being cancelled.      On the day of your procedure, please designate an adult(s) who can drive you home stay with you for the next 24 hours. The medicines used in the exam will make you sleepy. You will not be able to drive.      You cannot take public transportation, ride share services, or non-medical taxi service without a responsible caregiver.  Medical transport services are allowed with the requirement that a responsible caregiver will receive you at your destination.  We require that drivers and caregivers are confirmed prior to your procedure.  "

## 2023-10-19 ENCOUNTER — OFFICE VISIT (OUTPATIENT)
Dept: URGENT CARE | Facility: URGENT CARE | Age: 63
End: 2023-10-19
Payer: COMMERCIAL

## 2023-10-19 VITALS
WEIGHT: 157.6 LBS | SYSTOLIC BLOOD PRESSURE: 151 MMHG | OXYGEN SATURATION: 94 % | DIASTOLIC BLOOD PRESSURE: 85 MMHG | HEART RATE: 99 BPM | TEMPERATURE: 98.8 F | BODY MASS INDEX: 22.61 KG/M2

## 2023-10-19 DIAGNOSIS — M54.41 ACUTE RIGHT-SIDED LOW BACK PAIN WITH RIGHT-SIDED SCIATICA: Primary | ICD-10-CM

## 2023-10-19 PROCEDURE — 99213 OFFICE O/P EST LOW 20 MIN: CPT | Performed by: PHYSICIAN ASSISTANT

## 2023-10-19 RX ORDER — PREDNISONE 20 MG/1
40 TABLET ORAL DAILY
Qty: 10 TABLET | Refills: 0 | Status: SHIPPED | OUTPATIENT
Start: 2023-10-19 | End: 2023-10-24

## 2023-10-19 RX ORDER — CYCLOBENZAPRINE HCL 10 MG
10 TABLET ORAL 3 TIMES DAILY PRN
Qty: 30 TABLET | Refills: 0 | Status: SHIPPED | OUTPATIENT
Start: 2023-10-19 | End: 2023-10-25

## 2023-10-19 ASSESSMENT — ENCOUNTER SYMPTOMS
HEMATURIA: 0
CARDIOVASCULAR NEGATIVE: 1
PALPITATIONS: 0
VOMITING: 0
HEADACHES: 0
RESPIRATORY NEGATIVE: 1
SHORTNESS OF BREATH: 0
ALLERGIC/IMMUNOLOGIC NEGATIVE: 1
ABDOMINAL PAIN: 0
CHEST TIGHTNESS: 0
SORE THROAT: 0
CONSTITUTIONAL NEGATIVE: 1
DYSURIA: 0
FEVER: 0
MYALGIAS: 0
NEUROLOGICAL NEGATIVE: 1
DIARRHEA: 0
COUGH: 0
NAUSEA: 0
GASTROINTESTINAL NEGATIVE: 1
WHEEZING: 0
BACK PAIN: 1
FREQUENCY: 0
CHILLS: 0

## 2023-10-19 NOTE — PROGRESS NOTES
Chief Complaint:     Chief Complaint   Patient presents with    Back Pain     Lower back pain noticed it last weekend, pt lifted something up and twisted and he felt pain, worsening. Pt is hotting down right leg. Pt has been taking ibuprofen helps a little bit.     Leg Pain       Medical Decision Making:    Differential Diagnosis:  Back Pain: myofascial low back strain, lumbosacral strain, possible herniated disc , and acute sciatica     Vikas Natarajan was seen today for back pain and leg pain.    Diagnoses and all orders for this visit:    Acute right-sided low back pain with right-sided sciatica  -     cyclobenzaprine (FLEXERIL) 10 MG tablet; Take 1 tablet (10 mg) by mouth 3 times daily as needed for muscle spasms  -     predniSONE (DELTASONE) 20 MG tablet; Take 2 tablets (40 mg) by mouth daily for 5 days         Plan      Ice the area.  Alternate Ibuprofen and Tylenol for pain.  Stretching exercises.  Chiropractic may help.  Rx for Flexeril for muscle spasm.  Rx of prednisone to decrease inflammation.  Worrisome symptoms discussed with instructions to go to the ED.  Patient will follow up with PCP if symptoms have not resolved in 2 weeks.  Patient verbalized understanding and agreed with this plan.         HPI: Alexey Reed 62 year old male presents with a 5 day history of back pain.  The onset of pain began after lifting and twisting while loading wood.  Since then it has unchanged. The patient does have a past history of back problems - nothing acute to this level.  The pain is localized to right lumbosacral region.      There is no history of disturbance of bowel or bladder dysfunction associated with this present problem.      There is associated sciatica in the right leg. There is no paresthesia in the legs. The patient does not have a history of weakness in the legs.    ROS:      Review of Systems   Constitutional: Negative.  Negative for chills and fever.   HENT: Negative.  Negative for sore throat.     Respiratory: Negative.  Negative for cough, chest tightness, shortness of breath and wheezing.    Cardiovascular: Negative.  Negative for chest pain and palpitations.   Gastrointestinal: Negative.  Negative for abdominal pain, diarrhea, nausea and vomiting.   Genitourinary:  Negative for dysuria, frequency, hematuria and urgency.   Musculoskeletal:  Positive for back pain. Negative for myalgias.   Skin:  Negative for rash.   Allergic/Immunologic: Negative.  Negative for immunocompromised state.   Neurological: Negative.  Negative for headaches.        Family History   Family History   Problem Relation Age of Onset    Hypertension Mother     Arthritis Mother     Cancer Mother         BLADDER/ kidney    Gastrointestinal Disease Mother     Osteoporosis Mother     Hypertension Father     Prostate Cancer Father     Alcohol/Drug Father     Genitourinary Problems Father     Hypertension Maternal Grandmother     Cancer - colorectal Maternal Grandmother     Osteoporosis Maternal Grandfather     Alcohol/Drug Paternal Grandfather     Hypertension Brother     Hypertension Sister         etoh  58    Allergies Daughter     Hypertension Sister     Gynecology Sister     Neurologic Disorder Son 26        MS        Problem history  Patient Active Problem List   Diagnosis    Neuropathic pain syndrome (non-herpetic)    HYPERLIPIDEMIA LDL GOAL <130    Tenosynovitis of thumb    AC joint arthropathy    Anxiety    Hyperlipidemia with target LDL less than 130    Chronic pain disorder    FH: prostate cancer    Hypertension goal BP (blood pressure) < 140/90    Pulmonary nodules        Allergies  Allergies   Allergen Reactions    Ace Inhibitors Cough        Social History  Social History     Socioeconomic History    Marital status:      Spouse name: Not on file    Number of children: 4    Years of education: Not on file    Highest education level: Not on file   Occupational History    Occupation:      Comment:  [employer]   Tobacco Use    Smoking status: Former     Packs/day: 1.00     Years: 21.00     Additional pack years: 0.00     Total pack years: 21.00     Types: Cigarettes     Quit date: 2023     Years since quittin.5    Smokeless tobacco: Never   Substance and Sexual Activity    Alcohol use: No    Drug use: No     Comment: occ gummies    Sexual activity: Yes     Partners: Female   Other Topics Concern    Parent/sibling w/ CABG, MI or angioplasty before 65F 55M? No   Social History Narrative    Not on file     Social Determinants of Health     Financial Resource Strain: Not on file   Food Insecurity: Not on file   Transportation Needs: Not on file   Physical Activity: Not on file   Stress: Not on file   Social Connections: Not on file   Interpersonal Safety: Not on file   Housing Stability: Not on file        Current Meds    Current Outpatient Medications:     albuterol (PROAIR HFA/PROVENTIL HFA/VENTOLIN HFA) 108 (90 Base) MCG/ACT inhaler, Inhale 2 puffs into the lungs every 4 hours as needed for shortness of breath or wheezing, Disp: 18 g, Rfl: 1    albuterol (PROAIR HFA/PROVENTIL HFA/VENTOLIN HFA) 108 (90 Base) MCG/ACT inhaler, INHALE 2 PUFFS INTO THE LUNGS EVERY 6 HOURS AS NEEDED FOR SHORTNESS OF BREATH OR DIFFICULT BREATHING OR WHEEZING, Disp: 25.5 g, Rfl: 1    aspirin 81 MG EC tablet, Take 81 mg by mouth daily, Disp: , Rfl:     cyclobenzaprine (FLEXERIL) 10 MG tablet, Take 1 tablet (10 mg) by mouth 3 times daily as needed for muscle spasms, Disp: 30 tablet, Rfl: 0    guaiFENesin-codeine (ROBITUSSIN AC) 100-10 MG/5ML solution, Take 5 mLs by mouth every 4 hours as needed for cough, Disp: 118 mL, Rfl: 0    losartan (COZAAR) 100 MG tablet, Take 1 tablet (100 mg) by mouth daily, Disp: 90 tablet, Rfl: 0    MULTI-VITAMIN OR TABS, 1 TABLET DAILY, Disp: , Rfl:     predniSONE (DELTASONE) 20 MG tablet, Take 2 tablets (40 mg) by mouth daily for 5 days, Disp: 10 tablet, Rfl: 0    pregabalin (LYRICA) 150 MG  capsule, TAKE 1 CAPSULE BY MOUTH THREE TIMES DAILY, Disp: 90 capsule, Rfl: 0    PROAIR RESPICLICK 108 (90 Base) MCG/ACT inhaler, INHALE 2 PUFFS INTO THE LUNGS EVERY 6 HOURS AS NEEDED FOR SHORTNESS OF BREATH OR DIFFICULT BREATHING OR WHEEZING, Disp: 1 each, Rfl: 5    sertraline (ZOLOFT) 100 MG tablet, TAKE 1 TABLET(100 MG) BY MOUTH DAILY, Disp: 90 tablet, Rfl: 3    simvastatin (ZOCOR) 40 MG tablet, TAKE 1 TABLET(40 MG) BY MOUTH AT BEDTIME, Disp: 30 tablet, Rfl: 0    SPIRIVA HANDIHALER 18 MCG inhaled capsule, , Disp: , Rfl:         Physical Exam:     Vital signs reviewed by Terry Randolph PA-C  BP (!) 151/85   Pulse 99   Temp 98.8  F (37.1  C) (Tympanic)   Wt 71.5 kg (157 lb 9.6 oz)   SpO2 94%   BMI 22.61 kg/m       PEFR:    Physical Exam  Vitals and nursing note reviewed.   Constitutional:       General: He is not in acute distress.     Appearance: He is well-developed. He is not ill-appearing, toxic-appearing or diaphoretic.   HENT:      Head: Normocephalic and atraumatic.      Right Ear: Hearing, tympanic membrane, ear canal and external ear normal.      Left Ear: Hearing, tympanic membrane, ear canal and external ear normal.      Nose: Nose normal. No mucosal edema, congestion or rhinorrhea.      Mouth/Throat:      Pharynx: No oropharyngeal exudate or posterior oropharyngeal erythema.      Tonsils: No tonsillar exudate or tonsillar abscesses. 0 on the right. 0 on the left.   Eyes:      Conjunctiva/sclera: Conjunctivae normal.   Cardiovascular:      Rate and Rhythm: Normal rate and regular rhythm.      Heart sounds: Normal heart sounds, S1 normal and S2 normal. Heart sounds not distant. No murmur heard.     No friction rub. No gallop.   Pulmonary:      Effort: Pulmonary effort is normal. No respiratory distress.      Breath sounds: Normal breath sounds. No decreased breath sounds, wheezing, rhonchi or rales.   Abdominal:      General: Bowel sounds are normal. There is no distension.      Palpations: Abdomen  is soft.      Tenderness: There is no abdominal tenderness.   Musculoskeletal:      Cervical back: Normal, normal range of motion and neck supple.      Thoracic back: Normal.      Lumbar back: No deformity, spasms or tenderness. Positive left straight leg raise test.      Comments: Lower Extremity:   Gross sensation intact bilaterally, DTR 2+ bilaterally, strength 5/5 with exception of right knee flexion limited by pain.   Lymphadenopathy:      Cervical: No cervical adenopathy.   Skin:     General: Skin is warm and dry.      Findings: No rash.   Neurological:      Mental Status: He is alert.      Cranial Nerves: No cranial nerve deficit.   Psychiatric:         Attention and Perception: He is attentive.         Speech: Speech normal.         Behavior: Behavior normal. Behavior is cooperative.         Thought Content: Thought content normal.         Judgment: Judgment normal.               Terry Randolph PA-C  10/19/2023, 12:12 PM

## 2023-10-25 ENCOUNTER — OFFICE VISIT (OUTPATIENT)
Dept: FAMILY MEDICINE | Facility: CLINIC | Age: 63
End: 2023-10-25
Payer: COMMERCIAL

## 2023-10-25 ENCOUNTER — ANCILLARY PROCEDURE (OUTPATIENT)
Dept: GENERAL RADIOLOGY | Facility: CLINIC | Age: 63
End: 2023-10-25
Attending: INTERNAL MEDICINE
Payer: COMMERCIAL

## 2023-10-25 VITALS
WEIGHT: 157.38 LBS | OXYGEN SATURATION: 93 % | HEART RATE: 117 BPM | TEMPERATURE: 98.1 F | HEIGHT: 69 IN | SYSTOLIC BLOOD PRESSURE: 130 MMHG | DIASTOLIC BLOOD PRESSURE: 83 MMHG | BODY MASS INDEX: 23.31 KG/M2 | RESPIRATION RATE: 16 BRPM

## 2023-10-25 DIAGNOSIS — M54.41 ACUTE RIGHT-SIDED LOW BACK PAIN WITH RIGHT-SIDED SCIATICA: Primary | ICD-10-CM

## 2023-10-25 DIAGNOSIS — M54.41 ACUTE RIGHT-SIDED LOW BACK PAIN WITH RIGHT-SIDED SCIATICA: ICD-10-CM

## 2023-10-25 PROCEDURE — 99214 OFFICE O/P EST MOD 30 MIN: CPT | Performed by: INTERNAL MEDICINE

## 2023-10-25 PROCEDURE — 72100 X-RAY EXAM L-S SPINE 2/3 VWS: CPT | Mod: TC | Performed by: STUDENT IN AN ORGANIZED HEALTH CARE EDUCATION/TRAINING PROGRAM

## 2023-10-25 PROCEDURE — 72170 X-RAY EXAM OF PELVIS: CPT | Mod: TC | Performed by: RADIOLOGY

## 2023-10-25 RX ORDER — PREGABALIN 200 MG/1
200 CAPSULE ORAL 3 TIMES DAILY
Qty: 270 CAPSULE | Refills: 1 | Status: SHIPPED | OUTPATIENT
Start: 2023-10-25 | End: 2024-03-22

## 2023-10-25 RX ORDER — HYDROCODONE BITARTRATE AND ACETAMINOPHEN 5; 325 MG/1; MG/1
1 TABLET ORAL EVERY 12 HOURS PRN
Qty: 10 TABLET | Refills: 0 | Status: SHIPPED | OUTPATIENT
Start: 2023-10-25 | End: 2023-10-30

## 2023-10-25 RX ORDER — CELECOXIB 100 MG/1
100 CAPSULE ORAL 2 TIMES DAILY
Qty: 60 CAPSULE | Refills: 0 | Status: SHIPPED | OUTPATIENT
Start: 2023-10-25 | End: 2023-11-21

## 2023-10-25 ASSESSMENT — ENCOUNTER SYMPTOMS: BACK PAIN: 1

## 2023-10-25 ASSESSMENT — PAIN SCALES - GENERAL: PAINLEVEL: EXTREME PAIN (8)

## 2023-10-25 NOTE — PROGRESS NOTES
Assessment & Plan     Acute right-sided low back pain with right-sided sciatica  On the weekend of 14th and 15 October patient was camping  He lifted a wood  It was around 20 pounds in weight  While lifting he twisted  That caused back pain  Pain came on gradually  Pain is mainly in the lower back towards the right side of his spine  It radiates down his right buttock  It radiates all the way up to the ankle  He also has some numbness sensation on one of the toes  Denies any perineal numbness  Denies any fecal or urine incontinence  No history of any recent trauma although in 2020 he had a  fall while hiking and that caused hematoma of his sternum  No history of any prior back surgeries  He was evaluated in the urgent care and was placed on some steroids and Flexeril  He finished the steroids  Flexeril did not help him much  He is already on Lyrica 150 mg 3 times a day for neuropathic pain  He has not been taking it recently because he ran out of it  I will increase this to 200 mg 3 times a day  He has been taking ibuprofen  Change this to Celebrex  No contraindications for the same  He is not able to sleep at night  I will order some [place] just to take at night  Discussed about opioids and the risk factors of addiction and how he she should be taking it sparingly  There will be no refills on this  Also we will change the muscle relaxant to Zanaflex  I discussed about getting a spine referral and PT referral but he wants to see his primary care and will decide about it at that point  My differential diagnosis  at this time includes slipped disc versus any possibility of compression fracture as he is a smoker and has COPD so osteoporosis is a likely possibility=  Ultimately might need MRI  - Pregabalin (LYRICA) 200 MG capsule; Take 1 capsule (200 mg) by mouth 3 times daily  - celecoxib (CELEBREX) 100 MG capsule; Take 1 capsule (100 mg) by mouth 2 times daily  - tiZANidine (ZANAFLEX) 4 MG tablet; Take 1 tablet (4  mg) by mouth 3 times daily as needed for muscle spasms  - HYDROcodone-acetaminophen (NORCO) 5-325 MG tablet; Take 1 tablet by mouth every 12 hours as needed for pain  - XR Lumbar Spine 2/3 Views; Future  - XR Pelvis 1/2 Views; Future      30 minutes spent by me on the date of the encounter doing chart review, history and exam, documentation and further activities per the note           Wong Tom MD  [facility]    Mel Natarajan is a 62 year old, presenting for the following health issues:  Back Pain      10/25/2023     1:32 PM   Additional Questions   Roomed by Diane Lynne Schoenherr RN       Back Pain     History of Present Illness       Back Pain:  He presents for follow up of back pain. Patient's back pain is a new problem.    Original cause of back pain: lifting  First noticed back pain: 1-4 weeks ago  Patient feels back pain: constantlyLocation of back pain:  Right lower back  Description of back pain: sharp  Back pain spreads: right buttocks, right thigh and right foot    Since patient first noticed back pain, pain is: unchanged  Does back pain interfere with his job:  Yes  On a scale of 1-10 (10 being the worst), patient describes pain as:  8  What makes back pain worse: lying down, standing, stress and twisting   Acupuncture: not tried  Acetaminophen: not helpful  Activity or exercise: not helpful  Chiropractor:  Not helpful  Cold: not helpful  Heat: not helpful  Massage: not tried  Muscle relaxants: helpful  NSAIDS: helpful  Opioids: not tried  Physical Therapy: not tried  Rest: not tried  Steroid Injection: not tried  Stretching: helpful  Surgery: not tried  TENS unit: helpful  Topical pain relievers: helpful  Other healthcare providers patient is seeing for back pain: Chiropractor    He eats 0-1 servings of fruits and vegetables daily.He consumes 1 sweetened beverage(s) daily.He exercises with enough effort to increase his heart rate 30 to 60 minutes per day.  He  "exercises with enough effort to increase his heart rate 5 days per week. He is missing 1 dose(s) of medications per week.  He is not taking prescribed medications regularly due to remembering to take.         Pain History:  When did you first notice your pain? 2 weeks ago   Have you seen anyone else for your pain? Yes - Urgent care [facility]  How has your pain affected your ability to work? Able to work now and then; is an  and does not have to lift  Where in your body do you have pain? Back Pain  Onset/Duration: 2 weeks ago  Description:   Location of pain: low back right  Character of pain: stabbing  Pain radiation: radiates into the right buttocks and radiates into the right leg  New numbness or weakness in legs, not attributed to pain: YES no weakness  Intensity: Currently 8/10,   Progression of Symptoms: same  History:   Specific cause: lifting  loading wood; picked up and turned  Pain interferes with job: No  History of back problems: no prior back problems  Any previous MRI or X-rays: None  Sees a specialist for back pain: No  Alleviating factors:   Improved by: sitting with right knee up, shower massage, hot bath helps    Precipitating factors:  Worsened by: Bending, Standing, Sitting, Lying Flat, and Walking  Therapies tried and outcome: oral steroids, acetaminophen (Tylenol), heat, muscle relaxants, and NSAIDs    Accompanying Signs & Symptoms:  Risk of Fracture: None  Risk of Cauda Equina: None  Risk of Infection: None  Risk of Cancer: None  Risk of Ankylosing Spondylitis: Onset at age <35, male, AND morning back stiffness No                Review of Systems   Musculoskeletal:  Positive for back pain.            Objective    /83 (BP Location: Right arm, Patient Position: Sitting, Cuff Size: Adult Large)   Pulse 117   Temp 98.1  F (36.7  C) (Oral)   Resp 16   Ht 1.753 m (5' 9\")   Wt 71.4 kg (157 lb 6 oz)   SpO2 93%   BMI 23.24 kg/m    Body mass index is 23.24 kg/m .  Physical Exam "   GENERAL: healthy, alert and no distress  NECK: no adenopathy, no asymmetry, masses, or scars and thyroid normal to palpation  RESP: lungs clear to auscultation - no rales, rhonchi or wheezes  CV: regular rate and rhythm, normal S1 S2, no S3 or S4, no murmur, click or rub, no peripheral edema and peripheral pulses strong  MS: Tender in the lower back  He has tenderness on palpation of the L3-L4 area  Tenderness is more towards the right side of the spine  He also is tender to palpation of the right sacroiliac joint  Straight leg raise testing is negative on left side but positive on right side at 40 degrees  Reflexes are brisk

## 2023-10-30 ENCOUNTER — OFFICE VISIT (OUTPATIENT)
Dept: URGENT CARE | Facility: URGENT CARE | Age: 63
End: 2023-10-30
Payer: COMMERCIAL

## 2023-10-30 VITALS
WEIGHT: 158.5 LBS | SYSTOLIC BLOOD PRESSURE: 133 MMHG | TEMPERATURE: 97.3 F | RESPIRATION RATE: 20 BRPM | OXYGEN SATURATION: 95 % | BODY MASS INDEX: 23.41 KG/M2 | HEART RATE: 95 BPM | DIASTOLIC BLOOD PRESSURE: 74 MMHG

## 2023-10-30 DIAGNOSIS — M54.41 ACUTE BILATERAL LOW BACK PAIN WITH RIGHT-SIDED SCIATICA: Primary | ICD-10-CM

## 2023-10-30 PROCEDURE — 99213 OFFICE O/P EST LOW 20 MIN: CPT | Performed by: NURSE PRACTITIONER

## 2023-10-30 RX ORDER — HYDROCODONE BITARTRATE AND ACETAMINOPHEN 5; 325 MG/1; MG/1
1 TABLET ORAL EVERY 6 HOURS PRN
Qty: 25 TABLET | Refills: 0 | Status: SHIPPED | OUTPATIENT
Start: 2023-10-30 | End: 2023-11-03

## 2023-10-30 ASSESSMENT — PAIN SCALES - GENERAL: PAINLEVEL: EXTREME PAIN (8)

## 2023-10-30 NOTE — PROGRESS NOTES
SUBJECTIVE  HPI: Alexey Reed is a 63 year old male who presents for evaluation of back pain  This has been ongoing from a twisting/strain injury 3 weeks ago  Seen in urgent care and given prednisone and flexeril  Seen again 10/25 for follow up. Given celebrex and lyrica, told it may take a while for those to start working  Also given 10 norco. These are all that are helping with pain (it is severe)  There are no red flag symptoms or neuro deficits  It is lower back right side with sciatica.   Xray imaging has not shown any abnormalities beyond degeneration    Past Medical History:   Diagnosis Date    Hyperlipaemia     Neuropathic pain syndrome (non-herpetic)     Unspecified essential hypertension      Current Outpatient Medications   Medication Sig Dispense Refill    albuterol (PROAIR HFA/PROVENTIL HFA/VENTOLIN HFA) 108 (90 Base) MCG/ACT inhaler Inhale 2 puffs into the lungs every 4 hours as needed for shortness of breath or wheezing 18 g 1    albuterol (PROAIR HFA/PROVENTIL HFA/VENTOLIN HFA) 108 (90 Base) MCG/ACT inhaler INHALE 2 PUFFS INTO THE LUNGS EVERY 6 HOURS AS NEEDED FOR SHORTNESS OF BREATH OR DIFFICULT BREATHING OR WHEEZING 25.5 g 1    aspirin 81 MG EC tablet Take 81 mg by mouth daily      celecoxib (CELEBREX) 100 MG capsule Take 1 capsule (100 mg) by mouth 2 times daily 60 capsule 0    HYDROcodone-acetaminophen (NORCO) 5-325 MG tablet Take 1 tablet by mouth every 12 hours as needed for pain 10 tablet 0    losartan (COZAAR) 100 MG tablet Take 1 tablet (100 mg) by mouth daily 90 tablet 0    MULTI-VITAMIN OR TABS 1 TABLET DAILY      Pregabalin (LYRICA) 200 MG capsule Take 1 capsule (200 mg) by mouth 3 times daily 270 capsule 1    PROAIR RESPICLICK 108 (90 Base) MCG/ACT inhaler INHALE 2 PUFFS INTO THE LUNGS EVERY 6 HOURS AS NEEDED FOR SHORTNESS OF BREATH OR DIFFICULT BREATHING OR WHEEZING 1 each 5    sertraline (ZOLOFT) 100 MG tablet TAKE 1 TABLET(100 MG) BY MOUTH DAILY 90 tablet 3    simvastatin (ZOCOR) 40  MG tablet TAKE 1 TABLET(40 MG) BY MOUTH AT BEDTIME 30 tablet 0    SPIRIVA HANDIHALER 18 MCG inhaled capsule       tiZANidine (ZANAFLEX) 4 MG tablet Take 1 tablet (4 mg) by mouth 3 times daily as needed for muscle spasms 90 tablet 0     Social History     Tobacco Use    Smoking status: Former     Packs/day: 1.00     Years: 21.00     Additional pack years: 0.00     Total pack years: 21.00     Types: Cigarettes     Quit date: 2023     Years since quittin.5    Smokeless tobacco: Never   Substance Use Topics    Alcohol use: No       ROS:  Review of systems negative except as stated above.    OBJECTIVE:  /74 (BP Location: Left arm, Patient Position: Sitting, Cuff Size: Adult Regular)   Pulse 95   Temp 97.3  F (36.3  C) (Tympanic)   Resp 20   Wt 71.9 kg (158 lb 8 oz)   SpO2 95%   BMI 23.41 kg/m    Back examination: Back symmetric, no curvature. ROM normal. No CVA tenderness. Lower right sided back pain, radiates to foot  GENERAL APPEARANCE: alert and no distress  RESP: lungs clear to auscultation - no rales, rhonchi or wheezes  CV: regular rates and rhythm, normal S1 S2, no murmur noted  NEURO: Normal strength and tone with no weakness or sensory deficit noted, reflexes normal     ASSESSMENT/IMPRESSION:  (M54.41) Acute bilateral low back pain with right-sided sciatica  (primary encounter diagnosis)    Plan: HYDROcodone-acetaminophen (NORCO) 5-325 MG tablet (#23) enough to get him through pain management wise until see pcp Friday  Advised will not give anymore pain management in urgent care  Continue stretching and strengthening exercises.    Continue prn heat or ice application.    Juana Garcia, APRN CNP

## 2023-10-31 ENCOUNTER — MYC MEDICAL ADVICE (OUTPATIENT)
Dept: FAMILY MEDICINE | Facility: CLINIC | Age: 63
End: 2023-10-31
Payer: COMMERCIAL

## 2023-11-03 ENCOUNTER — OFFICE VISIT (OUTPATIENT)
Dept: FAMILY MEDICINE | Facility: CLINIC | Age: 63
End: 2023-11-03
Payer: COMMERCIAL

## 2023-11-03 ENCOUNTER — HOSPITAL ENCOUNTER (OUTPATIENT)
Dept: MRI IMAGING | Facility: CLINIC | Age: 63
Discharge: HOME | End: 2023-11-03
Attending: FAMILY MEDICINE | Admitting: FAMILY MEDICINE
Payer: COMMERCIAL

## 2023-11-03 VITALS
SYSTOLIC BLOOD PRESSURE: 134 MMHG | HEIGHT: 69 IN | OXYGEN SATURATION: 98 % | HEART RATE: 105 BPM | BODY MASS INDEX: 23.55 KG/M2 | TEMPERATURE: 98.3 F | WEIGHT: 159 LBS | RESPIRATION RATE: 18 BRPM | DIASTOLIC BLOOD PRESSURE: 81 MMHG

## 2023-11-03 DIAGNOSIS — Z12.5 SCREENING FOR PROSTATE CANCER: ICD-10-CM

## 2023-11-03 DIAGNOSIS — R73.01 IMPAIRED FASTING GLUCOSE: ICD-10-CM

## 2023-11-03 DIAGNOSIS — R29.898 RIGHT LEG WEAKNESS: ICD-10-CM

## 2023-11-03 DIAGNOSIS — M54.41 ACUTE BILATERAL LOW BACK PAIN WITH RIGHT-SIDED SCIATICA: ICD-10-CM

## 2023-11-03 DIAGNOSIS — E78.5 HYPERLIPIDEMIA LDL GOAL <130: ICD-10-CM

## 2023-11-03 DIAGNOSIS — I10 HYPERTENSION GOAL BP (BLOOD PRESSURE) < 140/90: ICD-10-CM

## 2023-11-03 DIAGNOSIS — J44.9 CHRONIC OBSTRUCTIVE PULMONARY DISEASE, UNSPECIFIED COPD TYPE (H): ICD-10-CM

## 2023-11-03 DIAGNOSIS — R53.83 FATIGUE, UNSPECIFIED TYPE: ICD-10-CM

## 2023-11-03 DIAGNOSIS — E78.5 HYPERLIPIDEMIA WITH TARGET LDL LESS THAN 130: Primary | ICD-10-CM

## 2023-11-03 DIAGNOSIS — M79.2 NEUROPATHIC PAIN SYNDROME (NON-HERPETIC): ICD-10-CM

## 2023-11-03 LAB
ALBUMIN SERPL BCG-MCNC: 3.9 G/DL (ref 3.5–5.2)
ALP SERPL-CCNC: 97 U/L (ref 40–129)
ALT SERPL W P-5'-P-CCNC: 24 U/L (ref 0–70)
ANION GAP SERPL CALCULATED.3IONS-SCNC: 10 MMOL/L (ref 7–15)
AST SERPL W P-5'-P-CCNC: 27 U/L (ref 0–45)
BASOPHILS # BLD AUTO: 0.1 10E3/UL (ref 0–0.2)
BASOPHILS NFR BLD AUTO: 1 %
BILIRUB SERPL-MCNC: 0.3 MG/DL
BUN SERPL-MCNC: 14.5 MG/DL (ref 8–23)
CALCIUM SERPL-MCNC: 9.7 MG/DL (ref 8.8–10.2)
CHLORIDE SERPL-SCNC: 100 MMOL/L (ref 98–107)
CHOLEST SERPL-MCNC: 169 MG/DL
CREAT SERPL-MCNC: 0.76 MG/DL (ref 0.67–1.17)
DEPRECATED HCO3 PLAS-SCNC: 27 MMOL/L (ref 22–29)
EGFRCR SERPLBLD CKD-EPI 2021: >90 ML/MIN/1.73M2
EOSINOPHIL # BLD AUTO: 0.1 10E3/UL (ref 0–0.7)
EOSINOPHIL NFR BLD AUTO: 1 %
ERYTHROCYTE [DISTWIDTH] IN BLOOD BY AUTOMATED COUNT: 13.2 % (ref 10–15)
GLUCOSE SERPL-MCNC: 123 MG/DL (ref 70–99)
HBA1C MFR BLD: 6.1 % (ref 0–5.6)
HCT VFR BLD AUTO: 44.2 % (ref 40–53)
HDLC SERPL-MCNC: 45 MG/DL
HGB BLD-MCNC: 14.4 G/DL (ref 13.3–17.7)
IMM GRANULOCYTES # BLD: 0 10E3/UL
IMM GRANULOCYTES NFR BLD: 0 %
LDLC SERPL CALC-MCNC: 111 MG/DL
LYMPHOCYTES # BLD AUTO: 1.6 10E3/UL (ref 0.8–5.3)
LYMPHOCYTES NFR BLD AUTO: 15 %
MCH RBC QN AUTO: 31.1 PG (ref 26.5–33)
MCHC RBC AUTO-ENTMCNC: 32.6 G/DL (ref 31.5–36.5)
MCV RBC AUTO: 96 FL (ref 78–100)
MONOCYTES # BLD AUTO: 1 10E3/UL (ref 0–1.3)
MONOCYTES NFR BLD AUTO: 9 %
NEUTROPHILS # BLD AUTO: 8.3 10E3/UL (ref 1.6–8.3)
NEUTROPHILS NFR BLD AUTO: 76 %
NONHDLC SERPL-MCNC: 124 MG/DL
PLATELET # BLD AUTO: 344 10E3/UL (ref 150–450)
POTASSIUM SERPL-SCNC: 4.9 MMOL/L (ref 3.4–5.3)
PROT SERPL-MCNC: 7.5 G/DL (ref 6.4–8.3)
PSA SERPL DL<=0.01 NG/ML-MCNC: 2.69 NG/ML (ref 0–4.5)
RBC # BLD AUTO: 4.63 10E6/UL (ref 4.4–5.9)
SODIUM SERPL-SCNC: 137 MMOL/L (ref 135–145)
TRIGL SERPL-MCNC: 65 MG/DL
TSH SERPL DL<=0.005 MIU/L-ACNC: 0.96 UIU/ML (ref 0.3–4.2)
WBC # BLD AUTO: 11 10E3/UL (ref 4–11)

## 2023-11-03 PROCEDURE — G0103 PSA SCREENING: HCPCS | Performed by: FAMILY MEDICINE

## 2023-11-03 PROCEDURE — 84443 ASSAY THYROID STIM HORMONE: CPT | Performed by: FAMILY MEDICINE

## 2023-11-03 PROCEDURE — 36415 COLL VENOUS BLD VENIPUNCTURE: CPT | Performed by: FAMILY MEDICINE

## 2023-11-03 PROCEDURE — 83036 HEMOGLOBIN GLYCOSYLATED A1C: CPT | Performed by: FAMILY MEDICINE

## 2023-11-03 PROCEDURE — 99214 OFFICE O/P EST MOD 30 MIN: CPT | Performed by: FAMILY MEDICINE

## 2023-11-03 PROCEDURE — 85025 COMPLETE CBC W/AUTO DIFF WBC: CPT | Performed by: FAMILY MEDICINE

## 2023-11-03 PROCEDURE — 80053 COMPREHEN METABOLIC PANEL: CPT | Performed by: FAMILY MEDICINE

## 2023-11-03 PROCEDURE — 80061 LIPID PANEL: CPT | Performed by: FAMILY MEDICINE

## 2023-11-03 PROCEDURE — 72148 MRI LUMBAR SPINE W/O DYE: CPT

## 2023-11-03 PROCEDURE — 72148 MRI LUMBAR SPINE W/O DYE: CPT | Mod: 26 | Performed by: RADIOLOGY

## 2023-11-03 RX ORDER — LOSARTAN POTASSIUM 100 MG/1
100 TABLET ORAL DAILY
Qty: 90 TABLET | Refills: 3 | Status: SHIPPED | OUTPATIENT
Start: 2023-11-03 | End: 2024-08-30

## 2023-11-03 RX ORDER — HYDROCODONE BITARTRATE AND ACETAMINOPHEN 5; 325 MG/1; MG/1
1 TABLET ORAL EVERY 6 HOURS PRN
Qty: 25 TABLET | Refills: 0 | Status: SHIPPED | OUTPATIENT
Start: 2023-11-06 | End: 2023-11-13

## 2023-11-03 RX ORDER — ALBUTEROL SULFATE 90 UG/1
2 AEROSOL, METERED RESPIRATORY (INHALATION) EVERY 4 HOURS PRN
Qty: 18 G | Refills: 11 | Status: SHIPPED | OUTPATIENT
Start: 2023-11-03 | End: 2024-03-04

## 2023-11-03 RX ORDER — TIOTROPIUM BROMIDE 18 UG/1
18 CAPSULE ORAL; RESPIRATORY (INHALATION) DAILY
Qty: 90 CAPSULE | Refills: 3 | Status: SHIPPED | OUTPATIENT
Start: 2023-11-03 | End: 2023-12-07

## 2023-11-03 RX ORDER — SIMVASTATIN 40 MG
40 TABLET ORAL AT BEDTIME
Qty: 90 TABLET | Refills: 3 | Status: SHIPPED | OUTPATIENT
Start: 2023-11-03 | End: 2024-08-30

## 2023-11-03 RX ORDER — SERTRALINE HYDROCHLORIDE 100 MG/1
100 TABLET, FILM COATED ORAL DAILY
Qty: 90 TABLET | Refills: 3 | Status: SHIPPED | OUTPATIENT
Start: 2023-11-03 | End: 2024-08-30

## 2023-11-03 ASSESSMENT — PAIN SCALES - GENERAL: PAINLEVEL: EXTREME PAIN (9)

## 2023-11-03 NOTE — PATIENT INSTRUCTIONS
Limit pain med to 3 daily    Fill refill on Monday    Walking as tolerated    Call to schedule mri    Stay on other meds

## 2023-11-03 NOTE — PROGRESS NOTES
Subjective   Delgado is a 63 year old, presenting for the following health issues:  Patient Request (Follow up on urgent care and provider visits for back and sciatic pain, Medication recheck)        11/3/2023     6:55 AM   Additional Questions   Roomed by Leilani Camargo       History of Present Illness       Back Pain:  He presents for follow up of back pain. Patient's back pain is a new problem.    Original cause of back pain: lifting  First noticed back pain: 1-4 weeks ago  Patient feels back pain: constantlyLocation of back pain:  Right lower back  Description of back pain: sharp  Back pain spreads: right buttocks, right thigh and right foot    Since patient first noticed back pain, pain is: unchanged  Does back pain interfere with his job:  Yes  On a scale of 1-10 (10 being the worst), patient describes pain as:  8  What makes back pain worse: lying down, standing, stress and twisting   Acupuncture: not tried  Acetaminophen: not helpful  Activity or exercise: not helpful  Chiropractor:  Not helpful  Cold: not helpful  Heat: not helpful  Massage: not tried  Muscle relaxants: helpful  NSAIDS: helpful  Opioids: not tried  Physical Therapy: not tried  Rest: not tried  Steroid Injection: not tried  Stretching: helpful  Surgery: not tried  TENS unit: helpful  Topical pain relievers: helpful  Other healthcare providers patient is seeing for back pain: Chiropractor    He eats 0-1 servings of fruits and vegetables daily.He consumes 1 sweetened beverage(s) daily.He exercises with enough effort to increase his heart rate 30 to 60 minutes per day.  He exercises with enough effort to increase his heart rate 5 days per week. He is missing 1 dose(s) of medications per week.  He is not taking prescribed medications regularly due to remembering to take.                 Review of Systems   Pain a little worse    Started when he lifted some wood when camping    Had old crutches available    Right sided back pain, going all  "the way to right foot    No bowel / bladder problems    Low back stiff occasionally     Nothing like this       Objective    /81 (BP Location: Right arm, Patient Position: Chair, Cuff Size: Adult Regular)   Pulse 105   Temp 98.3  F (36.8  C) (Temporal)   Resp 18   Ht 1.753 m (5' 9\")   Wt 72.1 kg (159 lb)   SpO2 98%   BMI 23.48 kg/m    Body mass index is 23.48 kg/m .  Physical Exam  Constitutional:       Appearance: He is well-developed.   HENT:      Head: Normocephalic and atraumatic.   Eyes:      Conjunctiva/sclera: Conjunctivae normal.   Neck:      Vascular: No carotid bruit.   Cardiovascular:      Rate and Rhythm: Normal rate and regular rhythm.      Heart sounds: Normal heart sounds.   Pulmonary:      Effort: Pulmonary effort is normal. No respiratory distress.      Breath sounds: Normal breath sounds.   Neurological:      Mental Status: He is alert and oriented to person, place, and time.      Cranial Nerves: No cranial nerve deficit.   Psychiatric:         Speech: Speech normal.         Behavior: Behavior normal.              Patient has some tenderness right lower lumbar area    Sensation okay both legs but some mild weakness especially dorsiflexion of right foot    Pos straight leg raising test on right    Range of motion of back fair, some pain with this  No pain on axial loading    No pain on hip manipulation    Has about 10 or 12 left pain med    Taking 3 daily    Mostly seated work       Saw lung specialist in May at hospital     Emphysema and infection at that time  Drained a liter of fluid on right side    Wants to stop [facility]    Quit smoking    On spiriva     Has prn albuterol          ASSESSMENT / PLAN:  (E78.5) Hyperlipidemia with target LDL less than 130  (primary encounter diagnosis)  Comment: check labs , fasting   Plan: Lipid panel reflex to direct LDL Fasting,         Comprehensive metabolic panel             (I10) Hypertension goal BP (blood pressure) < 140/90  Comment: needs " refill ; blood pressure okay   Plan: losartan (COZAAR) 100 MG tablet             (J44.9) Chronic obstructive pulmonary disease, unspecified COPD type (H)  Comment: patient wants to switch these meds over to us from the [facility] lung doctor.  He had CT earlier this year April  Plan: SPIRIVA HANDIHALER 18 MCG inhaled capsule,         albuterol (PROAIR HFA/PROVENTIL HFA/VENTOLIN         HFA) 108 (90 Base) MCG/ACT inhaler             (M54.41) Acute bilateral low back pain with right-sided sciatica  Comment: refill med, fill on Monday.  Max 3 daily  Plan: HYDROcodone-acetaminophen (NORCO) 5-325 MG         tablet, MR Lumbar Spine w/o Contrast             (R29.898) Right leg weakness  Comment: needs mri with weakness and worsening pain   Plan: MR Lumbar Spine w/o Contrast             (M79.2) Neuropathic pain syndrome (non-herpetic)  Comment: refill   Plan: sertraline (ZOLOFT) 100 MG tablet             (E78.5) Hyperlipidemia LDL goal <130  Comment: refill   Plan: simvastatin (ZOCOR) 40 MG tablet             (Z12.5) Screening for prostate cancer  Comment: psa   Plan: Prostate Specific Antigen Screen             (R53.83) Fatigue, unspecified type  Comment: check labs   Plan: CBC with Platelets & Differential, TSH with         free T4 reflex             (R73.01) Impaired fasting glucose  Comment: in prediabetes range for several years   Plan: Hemoglobin A1c               I reviewed the patient's medications, allergies, medical history, family history, and social history.    David Morse MD

## 2023-11-05 NOTE — RESULT ENCOUNTER NOTE
"The hemoglobin a1c is still in \"prediabetes\" range.    LDL \"bad\" cholesterol is slightly high.    Other labs are fine.    David Morse MD  "

## 2023-11-05 NOTE — RESULT ENCOUNTER NOTE
The mri does show pinching of a right sided nerve, likely causing your symptoms.    I will try to call in the next couple days.  We will likely refer you to a spine specialist.    David Morse MD

## 2023-11-06 ENCOUNTER — MYC MEDICAL ADVICE (OUTPATIENT)
Dept: FAMILY MEDICINE | Facility: CLINIC | Age: 63
End: 2023-11-06
Payer: COMMERCIAL

## 2023-11-06 DIAGNOSIS — R93.7 ABNORMAL MRI, LUMBAR SPINE: ICD-10-CM

## 2023-11-06 DIAGNOSIS — R29.898 RIGHT LEG WEAKNESS: Primary | ICD-10-CM

## 2023-11-06 DIAGNOSIS — M54.10 RADICULAR LEG PAIN: ICD-10-CM

## 2023-11-07 DIAGNOSIS — M54.41 ACUTE BILATERAL LOW BACK PAIN WITH RIGHT-SIDED SCIATICA: Primary | ICD-10-CM

## 2023-11-07 NOTE — TELEPHONE ENCOUNTER
SPINE PATIENTS - NEW PROTOCOL PREVISIT    RECORDS RECEIVED FROM: Internal   REASON FOR VISIT: lumbar pain radiating down (R) leg, surgical consult    Date of Appt: 11/09/2023   NOTES (FOR ALL VISITS) STATUS DETAILS   OFFICE NOTE from referring provider Internal 11/03/2023 Dr Morse [facility]    OFFICE NOTE from other specialist Internal 10/30/2023 Dr Garcia [facility] urgent care  10/25/2023 Dr Tom [facility]   10/19/2023 Dr Randolph [facility] urgent care   DISCHARGE SUMMARY from hospital N/A    DISCHARGE REPORT from ER N/A    OPERATIVE REPORT N/A    EMG REPORT N/A    MEDICATION LIST N/A    IMAGING  (FOR ALL VISITS)     MRI (HEAD, NECK, SPINE) Internal 11/03/2023 lumbar spine   XRAY (SPINE) *NEUROSURGERY* Internal 10/25/2023 pelvis hip, lumbar spine   CT (HEAD, NECK, SPINE) N/A

## 2023-11-07 NOTE — TELEPHONE ENCOUNTER
I called and discussed in detail  Did referral for spine specialist  Patient will call to schedule  David Morse MD

## 2023-11-09 ENCOUNTER — ANCILLARY PROCEDURE (OUTPATIENT)
Dept: GENERAL RADIOLOGY | Facility: CLINIC | Age: 63
End: 2023-11-09
Attending: STUDENT IN AN ORGANIZED HEALTH CARE EDUCATION/TRAINING PROGRAM
Payer: COMMERCIAL

## 2023-11-09 ENCOUNTER — OFFICE VISIT (OUTPATIENT)
Dept: NEUROSURGERY | Facility: CLINIC | Age: 63
End: 2023-11-09
Attending: FAMILY MEDICINE
Payer: COMMERCIAL

## 2023-11-09 ENCOUNTER — PRE VISIT (OUTPATIENT)
Dept: NEUROSURGERY | Facility: CLINIC | Age: 63
End: 2023-11-09

## 2023-11-09 VITALS
WEIGHT: 159 LBS | BODY MASS INDEX: 23.55 KG/M2 | HEART RATE: 87 BPM | SYSTOLIC BLOOD PRESSURE: 120 MMHG | DIASTOLIC BLOOD PRESSURE: 77 MMHG | HEIGHT: 69 IN

## 2023-11-09 DIAGNOSIS — M54.41 ACUTE BILATERAL LOW BACK PAIN WITH RIGHT-SIDED SCIATICA: ICD-10-CM

## 2023-11-09 DIAGNOSIS — R93.7 ABNORMAL MRI, LUMBAR SPINE: ICD-10-CM

## 2023-11-09 DIAGNOSIS — R29.898 RIGHT LEG WEAKNESS: ICD-10-CM

## 2023-11-09 DIAGNOSIS — M54.10 RADICULAR LEG PAIN: ICD-10-CM

## 2023-11-09 PROCEDURE — 99204 OFFICE O/P NEW MOD 45 MIN: CPT | Performed by: STUDENT IN AN ORGANIZED HEALTH CARE EDUCATION/TRAINING PROGRAM

## 2023-11-09 PROCEDURE — 72120 X-RAY BEND ONLY L-S SPINE: CPT | Mod: XS | Performed by: RADIOLOGY

## 2023-11-09 PROCEDURE — 72082 X-RAY EXAM ENTIRE SPI 2/3 VW: CPT | Performed by: RADIOLOGY

## 2023-11-09 NOTE — PATIENT INSTRUCTIONS
1.) You should receive a call from scheduling for the physical therapy referral and injection. Please see first page of AVS for scheduling numbers if needed.      Patient Instructions    Surgery scheduled at [facility] for Right Lumbar 4-5 Laminotomy and Discectomy with Dr. Huang    Pre-Operative  Surgical risks: blood clots in the leg or lung, problems urinating, nerve damage, drainage from the incision, infection,stiffness  Pre-operative physical with primary care physician within 30 days of surgical date.   Likely same day procedure with discharge home day of surgery, may stay for 23 hour observation hospitalization for monitoring.     Shower procedure  Please shower with antimicrobial soap the night before surgery and morning of surgery. Please refer to showering instruction sheet in folder.  Eating/Drinking  Stop solid foods 8 hours prior to arrival time  May have clear liquids up to 4 hours prior to arrival time   Clear liquids include water, clear juice, black coffee, or clear tea without milk, Gatorade, clear soda.   Medications  Hold aspirin, NSAIDs (Advil/Ibuprofen, Indocin, Naproxen,Nuprin,Relafen/Nabumetone, Diclofenac,Meloxicam, Aleve, Celebrex) x 7 days prior to surgical date  You can take Tylenol (Acetaminophen) for pain, 1000 mg  Do not exceed 3,000 mg per day   If you are on chronic pain medication (oxycodone, Percocet, hydrocodone, Vicodin, Norco, Dilaudid, morphine, MS Contin, naltrexone, Suboxone, etc) or have a pain contract we will reach out to your pain clinic to gather your most recent records and recommendations for pain management post-op.  Please ask your provider who manages your chronic pain if they require you to schedule an office visit prior to surgery. Continue obtaining your pain medications from your current provider until surgery. Our team will manage your acute post-op pain in the hospital and during the recovery period. Your pain team will continue to  "manage your chronic pain.  Any other medications prescribed, please discuss with your primary care provider at your pre-operative physical   As part of preparation for your upcoming procedure you are required to have a test for the novel Coronavirus, COVID-19  SDS Covid Testing: You will need to have a test for the novel Coronavirus, COVID-19.The rapid antigen test needs to be completed within 1-2 days prior to surgery. Please take a photo of the negative test and bring to surgery to show the nurse. If positive, please refer to the \"Before Your Procedure or Hospital Admission\" printout.  You may NOT receive the COVID-19 vaccine 72 hours before or after surgery.    Pain Management  You will have post-operative incisional pain which may require pain medications and muscle relaxants. You will receive medication upon discharge.  You may resume taking NSAIDs (ex. Ibuprofen, aleve, naproxen) immediately post-op  Do NOT drive while taking narcotic pain medication  Do NOT drink alcohol while using any pain medication  You can utilize ice as needed (no longer than 20 minutes at one time)    Incision Care  No submerging incision in water such as pools, hot tubs, baths for at least 8 weeks or until incision is healed  It is okay to shower, just pat the incision dry   Remove dressing as instructed upon discharge  Watch for signs of infection  Redness, swelling, warmth, drainage, and fever of 101 degrees or higher  Notify clinic [phone].    Activity Restrictions  For the first 6-8 weeks, no lifting > 10 pounds, limited bending, twisting, or overhead reaching.  Take stairs in moderation   Ok to walk as tolerated, take short frequent walks. You may gradually increase the distance as tolerated.   Avoid bed rest and prolonged sitting for longer than 30 minutes (change positions frequently while awake)  No contact sports until after follow up visit  No high impact activities such as; running/jogging, snowmobile or 4 patle " riding or any other recreational vehicles  Please call the clinic if you develop any of the following symptoms:  Swelling and/or warmth in one or both legs  Pain or tenderness in your leg, ankle, foot, or arm   Red or discolored skin     Post-Op Follow Up Appointments  2 week incision check with RN  6 week post op follow up visit with Physician Assistant or Nurse Practitioner and X-ray prior (for SDS ACDFs and Arthroplasties)  3 months post op with Dr. Huang and X-ray prior (for SDS ACDFs and Arthroplasties)  Please call to schedule follow up appointment at [phone]    Resources  If you are currently employed, you will need to be off work for 2-4 weeks for post op recovery and healing.  Please fax any FMLA/short term disability paperwork to [phone]  You may call our clinic when you'd like to return to work and we can provide a work letter  To allow staff adequate time to complete paperwork, please send as soon as possible       [facility]  Phone: [phone]  Fax: [phone]

## 2023-11-09 NOTE — PROGRESS NOTES
"HPI:  63-year-old male with 3 weeks of low back pain rating to the right lower extremity.  He noted a twinge of pain about 4 weeks ago when working on a car and then 3 weeks ago had acute onset of back pain rating to the right lower extremity when lifting something  The pain now starts in his low back radiates down the lateral aspect of his leg to the lateral aspect of the lower leg and stopping at the ankle.  He does notice some difficulty with raising his right foot.  He denies any left leg symptoms.  He notes that the pain and weakness he believes has gotten worse since its onset 3 to 4 weeks ago.  Only been taking over-the-counter medications without any other interventions at this point.  Current Outpatient Medications   Medication    albuterol (PROAIR HFA/PROVENTIL HFA/VENTOLIN HFA) 108 (90 Base) MCG/ACT inhaler    aspirin 81 MG EC tablet    celecoxib (CELEBREX) 100 MG capsule    losartan (COZAAR) 100 MG tablet    MULTI-VITAMIN OR TABS    Pregabalin (LYRICA) 200 MG capsule    sertraline (ZOLOFT) 100 MG tablet    simvastatin (ZOCOR) 40 MG tablet    SPIRIVA HANDIHALER 18 MCG inhaled capsule    tiZANidine (ZANAFLEX) 4 MG tablet    HYDROcodone-acetaminophen (NORCO) 5-325 MG tablet    PROAIR RESPICLICK 108 (90 Base) MCG/ACT inhaler     No current facility-administered medications for this visit.      Physical Exam:  Vital signs:      BP: 120/77 Pulse: 87           Height: 175.3 cm (5' 9\") Weight: 72.1 kg (159 lb)  Estimated body mass index is 23.48 kg/m  as calculated from the following:    Height as of this encounter: 1.753 m (5' 9\").    Weight as of this encounter: 72.1 kg (159 lb).  He has full strength in his bilateral lower extremities with the exception of right dorsiflexion which is 4+ out of 5.  He is unable to walk on his heels on the right side due to the weakness but is able to walk on his toes without difficulty.  Sensation is intact light touch throughout.  Patellar reflex on the right is 1+ and " patellar reflex on the left is 2+.  Results Reviewed:  I personally viewed the images of an MRI of the lumbar spine.  This shows L4-5 spondylosis with a foraminal disc herniation on the right side at L4-5 compressing the exiting L4 nerve root.  No other areas of significant central canal or foraminal stenosis present in the lumbar spine.  Assessment:  63-year-old male with a right L4 radiculopathy consistent with imaging findings.  He does have pain down the lateral aspect of the lower leg however with the depressed L4 reflex as well as the imaging findings and dorsiflexion weakness this does fit with an L4 radiculopathy.  Plan:  We discussed conservative and surgical management options going forward.  He has not tried any physical therapy or any injections at this point however he does have some weakness.  I would suggest earlier surgical intervention rather than later due to the weakness present.  I will have him start with physical therapy as soon as possible to see if this does improve his symptoms and we will try and get him in for an injection as soon as possible while waiting for surgery date.  We will try and schedule him soon as possible for surgery via a minimally invasive laminotomy and discectomy at L4-5 on the right.  We discussed the risks and benefits of this procedure in detail.  We discussed the recovery expectations as well.  He is interested in trying all options prior to surgery as well but would like to expedite the procedure date and not wait to finish these interventions before scheduling surgery which I agree with.    Konrad Huang MD

## 2023-11-09 NOTE — PROGRESS NOTES
"HPI:  Current Outpatient Medications   Medication    albuterol (PROAIR HFA/PROVENTIL HFA/VENTOLIN HFA) 108 (90 Base) MCG/ACT inhaler    aspirin 81 MG EC tablet    celecoxib (CELEBREX) 100 MG capsule    losartan (COZAAR) 100 MG tablet    MULTI-VITAMIN OR TABS    Pregabalin (LYRICA) 200 MG capsule    sertraline (ZOLOFT) 100 MG tablet    simvastatin (ZOCOR) 40 MG tablet    SPIRIVA HANDIHALER 18 MCG inhaled capsule    tiZANidine (ZANAFLEX) 4 MG tablet    HYDROcodone-acetaminophen (NORCO) 5-325 MG tablet    PROAIR RESPICLICK 108 (90 Base) MCG/ACT inhaler     No current facility-administered medications for this visit.      Physical Exam:  Vital signs:      BP: 120/77 Pulse: 87           Height: 175.3 cm (5' 9\") Weight: 72.1 kg (159 lb)  Estimated body mass index is 23.48 kg/m  as calculated from the following:    Height as of this encounter: 1.753 m (5' 9\").    Weight as of this encounter: 72.1 kg (159 lb).  Results Reviewed:  Assessment:  Plan:    "

## 2023-11-09 NOTE — NURSING NOTE
"Reviewed pre- and post-operative instructions as outlined in the After Visit Summary/Patient Instructions with patient.   Surgery folder was given to patient    Patient Education Topic:  pre-op teaching     Learner(s): Patient    Knowledge Level: Advanced     Readiness to Learn: Ready    Method:  Verbal explanation and Written material     Outcome: Able to verbalize instructions    Barriers to Learning: No barrier    Covid Testing: patient educated they will need to have a test for the novel Coronavirus, COVID-19.The rapid antigen test needs to be completed within 1-2 days prior to surgery. Patient instructed to take a photo of the negative test and bring to surgery to show to the nurse. If positive, patient instructed to refer to the \"Before Your Procedure or Hospital Admission\" printout.    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 Advised Patient to call clinic with any questions/concerns. Gave patient antibacterial soap for pre-surgery skin preparation.          Sally Price, RNCC  Neurology/Neurosurgery       "

## 2023-11-09 NOTE — LETTER
"    11/9/2023         RE: Alexey Reed  [address]  [place] [address]        Dear Colleague,    Thank you for referring your patient, Alexey Reed, to the [facility] [place]. Please see a copy of my visit note below.    HPI:  63-year-old male with 3 weeks of low back pain rating to the right lower extremity.  He noted a twinge of pain about 4 weeks ago when working on a car and then 3 weeks ago had acute onset of back pain rating to the right lower extremity when lifting something  The pain now starts in his low back radiates down the lateral aspect of his leg to the lateral aspect of the lower leg and stopping at the ankle.  He does notice some difficulty with raising his right foot.  He denies any left leg symptoms.  He notes that the pain and weakness he believes has gotten worse since its onset 3 to 4 weeks ago.  Only been taking over-the-counter medications without any other interventions at this point.  Current Outpatient Medications   Medication     albuterol (PROAIR HFA/PROVENTIL HFA/VENTOLIN HFA) 108 (90 Base) MCG/ACT inhaler     aspirin 81 MG EC tablet     celecoxib (CELEBREX) 100 MG capsule     losartan (COZAAR) 100 MG tablet     MULTI-VITAMIN OR TABS     Pregabalin (LYRICA) 200 MG capsule     sertraline (ZOLOFT) 100 MG tablet     simvastatin (ZOCOR) 40 MG tablet     SPIRIVA HANDIHALER 18 MCG inhaled capsule     tiZANidine (ZANAFLEX) 4 MG tablet     HYDROcodone-acetaminophen (NORCO) 5-325 MG tablet     PROAIR RESPICLICK 108 (90 Base) MCG/ACT inhaler     No current facility-administered medications for this visit.      Physical Exam:  Vital signs:      BP: 120/77 Pulse: 87           Height: 175.3 cm (5' 9\") Weight: 72.1 kg (159 lb)  Estimated body mass index is 23.48 kg/m  as calculated from the following:    Height as of this encounter: 1.753 m (5' 9\").    Weight as of this encounter: 72.1 kg (159 lb).  He has full strength in his bilateral lower extremities " with the exception of right dorsiflexion which is 4+ out of 5.  He is unable to walk on his heels on the right side due to the weakness but is able to walk on his toes without difficulty.  Sensation is intact light touch throughout.  Patellar reflex on the right is 1+ and patellar reflex on the left is 2+.  Results Reviewed:  I personally viewed the images of an MRI of the lumbar spine.  This shows L4-5 spondylosis with a foraminal disc herniation on the right side at L4-5 compressing the exiting L4 nerve root.  No other areas of significant central canal or foraminal stenosis present in the lumbar spine.  Assessment:  63-year-old male with a right L4 radiculopathy consistent with imaging findings.  He does have pain down the lateral aspect of the lower leg however with the depressed L4 reflex as well as the imaging findings and dorsiflexion weakness this does fit with an L4 radiculopathy.  Plan:  We discussed conservative and surgical management options going forward.  He has not tried any physical therapy or any injections at this point however he does have some weakness.  I would suggest earlier surgical intervention rather than later due to the weakness present.  I will have him start with physical therapy as soon as possible to see if this does improve his symptoms and we will try and get him in for an injection as soon as possible while waiting for surgery date.  We will try and schedule him soon as possible for surgery via a minimally invasive laminotomy and discectomy at L4-5 on the right.  We discussed the risks and benefits of this procedure in detail.  We discussed the recovery expectations as well.  He is interested in trying all options prior to surgery as well but would like to expedite the procedure date and not wait to finish these interventions before scheduling surgery which I agree with.    Konrad Huang MD      Again, thank you for allowing me to participate in the care of your patient.         Sincerely,        Konrad Huang MD

## 2023-11-13 NOTE — PROGRESS NOTES
[facility] - Procedure Note    Date of Service: 11/15/2023    Procedure performed: RIGHT L4-5 transforaminal epidural steroid injection with fluoroscopic guidance  Diagnosis: Lumbar spondylosis; Lumbar radiculitis/radiculopathy  : Sierra Celis MD  Anesthesia: none    Indications: Alexey Reed is a 63 year old male who is seen at the request of Dr. Huang for lumbar transforaminal epidural steroid injection. The patient describes right sided low back pain with radiation down the posterior thigh and calf on the right. The patient has been exhibiting symptoms consistent with lumbar intraspinal inflammation and radiculopathy. Symptoms have been persistent, disabling, and intermittently severe. The patient reports minimal improvement with conservative treatment, including PT and medications.    LUMBAR MRI was done on 11/3/2023 which showed:   FINDINGS:  There are 5 lumbar type vertebrae, used for the purposes of this  dictation. Conus tip at T12-L1. Normal lumbar vertebral alignment.  Multilevel disc dehydration. Mild loss of intervertebral disc height  at L4-5. Degenerative marrow signal changes including edema and a  Schmorl's node at L4-5.     On a level by level basis, the findings are as follows:     L1-2: No spinal canal or neural foraminal stenosis.     L2-3: Mild bilateral facet hypertrophy. No spinal canal or neural  foraminal stenosis.     L3-4: Posterior disc bulge and bilateral facet hypertrophy. No spinal  canal or neural foraminal stenosis.     L4-5: Asymmetric right circumferential disc osteophyte complex. Right  greater than left facet hypertrophy. Mild spinal canal narrowing.  Moderate to severe right neural foraminal stenosis with associated  right L4 nerve root impingement. No left neural foraminal stenosis.     L5-S1: Bilateral facet hypertrophy. No spinal canal or neural  foraminal stenosis.     The visualized paraspinous soft tissues are within normal  limits.                                                                     IMPRESSION:  Multilevel lumbar spondylosis, most pronounced at L4-5 where there is  moderate to severe right neural foraminal stenosis and associated  right L4 nerve root impingement    Allergies:      Allergies   Allergen Reactions    Ace Inhibitors Cough        Vitals:  /76   Pulse 77   SpO2 94%     Review of Systems: The patient denies recent fever, chills, illness, use of antibiotics or anticoagulants. All other 10-point review of systems negative.     Procedure: The procedure and risks were explained, and informed written consent was obtained from the patient. Risks include but are not limited to: infection, bleeding, increased pain, and damage to soft tissue, nerve, muscle, and vasculature structures. After getting informed consent, patient was brought into the procedure suite and was placed in a prone position on the procedure table. A Pause for the Cause was performed. Patient was prepped and draped in sterile fashion.     After identifying the right L4 neuroforamen, the C-arm was rotated to a right lateral oblique angle.  A total of 4.5 mL of Lidocaine 1% was used to anesthetize the skin and the needle track at a skin entry site coaxial with the fluoroscopy beam, and overriding the superior aspect of the neuroforamen.  A 22 gauge 5 inch spinal needle was advanced under intermittent fluoroscopy until it entered the foramen superiorly.    The position was then inspected from anteroposterior and lateral views, and the needle adjusted appropriately.  After negative aspiration, a total of 1 mL of Omnipaque-300 was injected using static and continuous fluoroscopy confirming appropriate position, with spread along the nerve root sheath and into the epidural space, with no intravascular or intrathecal uptake. 0 mL of Omnipaque-300 was wasted.    1mL of 1% lidocaine with 20 mg of dexamethasone was injected.  The needle was removed.  Hemostasis was achieved, the area was cleaned, and bandaids were placed when appropriate. Images were saved to PACS.    The patient tolerated the procedure well, and was taken to the recovery room, and there was no evidence of procedural complications. No new sensory or motor deficits were noted following the procedure. The patient was stable and able to ambulate on discharge home. Post-procedure instructions were provided.     Pre-procedure pain score: 7/10 in the back, 7/10 in the leg  Post-procedure pain score: 5/10 in the back, 5/10 in the leg    Assessment/Plan: Alexey Reed is a 63 year old male s/p right L4-5 transforaminal epidural steroid injection today for lumbar spondylosis, radiculitis/radiculopathy.     1. Following today's procedure, the patient was advised to contact the Pain Management Center for any of the following:   Fever, chills, or night sweats   New onset of pain, numbness, or weakness   Any questions/concerns regarding the procedure  If unable to contact the Pain Center, the patient was instructed to go to a local Emergency Room for any complications.   2. The patient should follow-up with the referring provider in 2 weeks for post-procedure evaluation.      DEE DEE CARLISLE MD   Pain Management

## 2023-11-15 ENCOUNTER — RADIOLOGY INJECTION OFFICE VISIT (OUTPATIENT)
Dept: PALLIATIVE MEDICINE | Facility: CLINIC | Age: 63
End: 2023-11-15
Payer: COMMERCIAL

## 2023-11-15 VITALS — DIASTOLIC BLOOD PRESSURE: 76 MMHG | OXYGEN SATURATION: 94 % | SYSTOLIC BLOOD PRESSURE: 126 MMHG | HEART RATE: 77 BPM

## 2023-11-15 DIAGNOSIS — M54.10 RADICULAR LEG PAIN: ICD-10-CM

## 2023-11-15 DIAGNOSIS — R29.898 RIGHT LEG WEAKNESS: ICD-10-CM

## 2023-11-15 DIAGNOSIS — M54.16 LUMBAR RADICULOPATHY: Primary | ICD-10-CM

## 2023-11-15 PROCEDURE — 64483 NJX AA&/STRD TFRM EPI L/S 1: CPT | Mod: RT | Performed by: ANESTHESIOLOGY

## 2023-11-15 RX ORDER — DEXAMETHASONE SODIUM PHOSPHATE 10 MG/ML
10 INJECTION, SOLUTION INTRAMUSCULAR; INTRAVENOUS ONCE
Status: COMPLETED | OUTPATIENT
Start: 2023-11-15 | End: 2023-11-15

## 2023-11-15 RX ADMIN — DEXAMETHASONE SODIUM PHOSPHATE 10 MG: 10 INJECTION, SOLUTION INTRAMUSCULAR; INTRAVENOUS at 10:38

## 2023-11-15 NOTE — NURSING NOTE
Discharge Information    IV Discontiued Time:  NA    Amount of Fluid Infused:  NA    Discharge Criteria = When patient returns to baseline or as per MD order    Consciousness:  Pt is fully awake    Circulation:  BP +/- 20% of pre-procedure level    Respiration:  Patient is able to breathe deeply    O2 Sat:  Patient is able to maintain O2 Sat >92% on room air    Activity:  Moves 4 extremities on command    Ambulation:  Patient is able to stand and walk or stand and pivot into wheelchair    Dressing:  Clean/dry or No Dressing    Notes:   Discharge instructions and AVS given to patient    Patient meets criteria for discharge?  YES    Admitted to PCU?  No    Responsible adult present to accompany patient home?  Yes    Signature/Title:    Lisa Carrera RN  RN Care Coordinator  [facility]

## 2023-11-15 NOTE — PATIENT INSTRUCTIONS
[facility] Procedure Discharge Instructions    Today you saw:   Dr. Sierra Celis      Your procedure:  Epidural steroid injection       Medications used:  Lidocaine (anesthetic)  Dexamethasone (steroid)       Omnipaque (contrast)   Saline    You were holding your blood thinning medication, please restart taking it: OK to restart anytime        Be cautious when walking as numbness and/or weakness in the legs may occur up to 6-8 hours after the procedure due to effect of the local anesthetic  Do not drive for 6 hours. The effect of the local anesthetic could slow your reflexes.   Avoid strenuous activity for the first 24 hours. You may resume your regular activities after that.   You may shower, however avoid swimming, tub baths or hot tubs for 24 hours following your procedure  You may have a mild to moderate increase in pain for several days following the injection.    You may use ice packs for 10-15 minutes, 3 to 4 times a day at the injection site for comfort  Do not use heat to painful areas for 6 to 8 hours. This will give the local anesthetic time to wear off and prevent you from accidentally burning your skin.  Unless you have been directed to avoid the use of anti-inflammatory medications (NSAIDS-ibuprofen, Aleve, Motrin), you may use these medications or Tylenol for pain control if needed.   Possible side effects of steroids that you may experience include flushing, elevated blood pressure, increased appetite, mild headaches and restlessness.  All of these symptoms will get better with time.  It may take up to 14 days for the steroid medication to start working although you may feel the effect as early as a few days after the procedure.   Follow up with your referring provider (Dr Huang) in 2-3 weeks    If you experience any of the following, call the pain center line during work hours at [phone] or on-call physician after hours at [phone]:  -Fever over 100 degree F  -Swelling,  bleeding, redness, drainage, warmth at the injection site  -Progressive weakness or numbness in your legs or arms  -Loss of bowel or bladder function  -Unusual headache that is not relieved by Tylenol or your regular headache medication  -Unusual new onset of pain that is not improving

## 2023-11-15 NOTE — NURSING NOTE
Pre-procedure Intake    If YES to any questions or NO to having a   Please complete laminated checklist and leave on the computer keyboard for Provider, verbally inform provider if able.    For SCS Trial, RFA's or any sedation procedure:  Have you been fasting? NA  If yes, for how long?        Are you taking any any blood thinners such as Coumadin, Warfarin, Jantoven, Pradaxa Xarelto, Eliquis, Edoxaban, Enoxaparin, Lovenox, Heparin, Arixtra, Fondaparinux, or Fragmin? OR Antiplatelet medication such as Plavix, Brilinta, or Effient?  NO   If yes, when did you take your last dose?       Do you take aspirin?   NO  If cervical procedure, have you held aspirin for 6 days?   NA    Do you have any allergies to contrast dye, iodine, steroid and/or numbing medications?  NO     Are you currently taking antibiotics or have an active infection?  NO     Have you had a fever/elevated temperature within the past week? NO     Are you currently taking oral steroids? NO     Do you have a ? Yes     Are you pregnant or breastfeeding? NA     Has the patient had a flu shot or any other vaccinations within the past 7 days?  NO     Notify provider and RNs if systolic BP >170, diastolic BP >100, P >100 or O2 sats < 90%

## 2023-11-21 ENCOUNTER — OFFICE VISIT (OUTPATIENT)
Dept: FAMILY MEDICINE | Facility: CLINIC | Age: 63
End: 2023-11-21
Payer: COMMERCIAL

## 2023-11-21 VITALS
DIASTOLIC BLOOD PRESSURE: 74 MMHG | HEART RATE: 85 BPM | HEIGHT: 69 IN | WEIGHT: 159.5 LBS | TEMPERATURE: 98.5 F | BODY MASS INDEX: 23.62 KG/M2 | SYSTOLIC BLOOD PRESSURE: 133 MMHG | OXYGEN SATURATION: 95 % | RESPIRATION RATE: 16 BRPM

## 2023-11-21 DIAGNOSIS — R29.898 RIGHT LEG WEAKNESS: ICD-10-CM

## 2023-11-21 DIAGNOSIS — M54.41 ACUTE RIGHT-SIDED LOW BACK PAIN WITH RIGHT-SIDED SCIATICA: ICD-10-CM

## 2023-11-21 DIAGNOSIS — M79.604 LOW BACK PAIN RADIATING TO RIGHT LEG: ICD-10-CM

## 2023-11-21 DIAGNOSIS — M54.50 LOW BACK PAIN RADIATING TO RIGHT LEG: ICD-10-CM

## 2023-11-21 DIAGNOSIS — M54.41 ACUTE BILATERAL LOW BACK PAIN WITH RIGHT-SIDED SCIATICA: ICD-10-CM

## 2023-11-21 DIAGNOSIS — Z01.818 PREOP GENERAL PHYSICAL EXAM: Primary | ICD-10-CM

## 2023-11-21 PROCEDURE — 99214 OFFICE O/P EST MOD 30 MIN: CPT | Performed by: FAMILY MEDICINE

## 2023-11-21 RX ORDER — CELECOXIB 100 MG/1
100 CAPSULE ORAL 2 TIMES DAILY
Qty: 60 CAPSULE | Refills: 0 | Status: ON HOLD | OUTPATIENT
Start: 2023-11-21 | End: 2023-12-18

## 2023-11-21 RX ORDER — HYDROCODONE BITARTRATE AND ACETAMINOPHEN 5; 325 MG/1; MG/1
1 TABLET ORAL EVERY 6 HOURS PRN
Qty: 60 TABLET | Refills: 0 | Status: ON HOLD | OUTPATIENT
Start: 2023-11-21 | End: 2023-12-18

## 2023-11-21 RX ORDER — HYDROCODONE BITARTRATE AND ACETAMINOPHEN 5; 325 MG/1; MG/1
1 TABLET ORAL EVERY 6 HOURS PRN
Qty: 60 TABLET | Refills: 0 | Status: SHIPPED | OUTPATIENT
Start: 2023-11-21 | End: 2023-11-21

## 2023-11-21 ASSESSMENT — PAIN SCALES - GENERAL: PAINLEVEL: EXTREME PAIN (8)

## 2023-11-21 NOTE — PATIENT INSTRUCTIONS
Hold celebrex for one week prior     Stay off aspirin    Hold losartan the morning of surgery    Other meds as is

## 2023-11-21 NOTE — PROGRESS NOTES
[facility]  [address] [place] [address]  [place] [address]  Phone: [phone]  Primary Provider: Johanna Jordan  Pre-op Performing Provider: JOHANNA JORDAN       PREOPERATIVE EVALUATION:  Today's date: 11/21/2023    Delgado is a 63 year old, presenting for the following:  Pre-Op Exam        11/21/2023     7:41 AM   Additional Questions   Roomed by Leilani Camargo       Surgical Information:  Surgery/Procedure: Back surgery  Surgery Location: [facility]  Surgeon: Sal  Surgery Date: 12/18/2023  Time of Surgery: 1:00pm  Where patient plans to recover: At home with family  Fax number for surgical facility: Note does not need to be faxed, will be available electronically in Epic.        Subjective       HPI related to upcoming procedure: 63 year old male with back and right leg pain for over a month.   To have low back laminotomy and diskectomy per surgeon.         11/21/2023     7:43 AM   Preop Questions   1. Have you ever had a heart attack or stroke? No   2. Have you ever had surgery on your heart or blood vessels, such as a stent placement, a coronary artery bypass, or surgery on an artery in your head, neck, heart, or legs? No   3. Do you have chest pain with activity? No   4. Do you have a history of  heart failure? No   5. Do you currently have a cold, bronchitis or symptoms of other infection? No   6. Do you have a cough, shortness of breath, or wheezing? No   7. Do you or anyone in your family have previous history of blood clots? No   8. Do you or does anyone in your family have a serious bleeding problem such as prolonged bleeding following surgeries or cuts? No   9. Have you ever had problems with anemia or been told to take iron pills? No   10. Have you had any abnormal blood loss such as black, tarry or bloody stools? No   11. Have you ever had a blood transfusion? No   12. Are you willing to have a blood transfusion if it is medically needed before, during, or after your surgery?  Yes   13. Have you or any of your relatives ever had problems with anesthesia? No   14. Do you have sleep apnea, excessive snoring or daytime drowsiness? No   15. Do you have any artifical heart valves or other implanted medical devices like a pacemaker, defibrillator, or continuous glucose monitor? No   16. Do you have artificial joints? No   17. Are you allergic to latex? No       Health Care Directive:  Patient does not have a Health Care Directive or Living Will    Preoperative Review of :  Patient on hydrocodone currently prn  {        Review of Systems  Constitutional, neuro, ENT, endocrine, pulmonary, cardiac, gastrointestinal, genitourinary, musculoskeletal, integument and psychiatric systems are negative, except as otherwise noted.    Taking a prescription pain pill about 4 times daily    Still  able to work ( desk job )    No recent illnesses    No cardiac history      No chest pain/ breathing problems    No bleeding/ clotting  disorders    No history of anesthesia problems    Patient quit smoking earlier this year     Patient Active Problem List    Diagnosis Date Noted    Pulmonary nodules 08/18/2020     Priority: Medium     6 mm pulmonary nodule seen on chest CT dated 8/2020 recommend repeat chest CT scanning in 6 to 12 months~02/2021      Hypertension goal BP (blood pressure) < 140/90 09/25/2017     Priority: Medium    FH: prostate cancer 08/16/2016     Priority: Medium    Chronic pain disorder 10/07/2014     Priority: Medium     Neuropathic post trauma.procedure left neck  Patient is followed by PRAVEENA TUCKER for ongoing prescription of narcotic pain medicine.  Med: Norco 5/325.   Maximum use per month: 120  Expected duration: lifelong  Narcotic agreement on file: YES  Clinic visit recommended: q 12 months          Anxiety 09/01/2014     Priority: Medium    Hyperlipidemia with target LDL less than 130 09/01/2014     Priority: Medium     Diagnosis updated by automated process. Provider to review  and confirm.      AC joint arthropathy 10/14/2013     Priority: Medium    Tenosynovitis of thumb 05/31/2012     Priority: Medium    HYPERLIPIDEMIA LDL GOAL <130 10/31/2010     Priority: Medium    Neuropathic pain syndrome (non-herpetic)      Priority: Medium     Residual after post traumatic left neck surgery at the [facility]   Controlled with Lyrica and Vicodin 3-4/day  Patient is followed by PRAVEENA TUCKER for ongoing prescription of narcotic pain medicine.  Med: Norco 5/325.   Maximum use per month: 120  Expected duration: lifelogn  Narcotic agreement on file: YES  Clinic visit recommended: q 12 months        Past Medical History:   Diagnosis Date    Hyperlipaemia     Neuropathic pain syndrome (non-herpetic)     Unspecified essential hypertension      Past Surgical History:   Procedure Laterality Date    COLONOSCOPY      COLONOSCOPY N/A 4/29/2022    Procedure: COLONOSCOPY, WITH POLYPECTOMY AND BIOPSY;  Surgeon: Betty Ray DO;  Location: [facility] OR    COLONOSCOPY WITH CO2 INSUFFLATION N/A 6/28/2019    Procedure: COLONOSCOPY, WITH CO2 INSUFFLATION;  Surgeon: Armin Carcamo DO;  Location: [facility] OR    COLONOSCOPY WITH CO2 INSUFFLATION N/A 4/29/2022    Procedure: COLONOSCOPY, WITH CO2 INSUFFLATION;  Surgeon: Betty Ray DO;  Location: [facility] OR    COMBINED ESOPHAGOSCOPY, GASTROSCOPY, DUODENOSCOPY (EGD) WITH CO2 INSUFFLATION N/A 4/29/2022    Procedure: ESOPHAGOGASTRODUODENOSCOPY, WITH CO2 INSUFFLATION;  Surgeon: Betty Ray DO;  Location: [facility] OR    ESOPHAGOSCOPY, GASTROSCOPY, DUODENOSCOPY (EGD), COMBINED N/A 4/29/2022    Procedure: ESOPHAGOGASTRODUODENOSCOPY, WITH BIOPSY;  Surgeon: Betty Ray DO;  Location: [facility] OR    HEAD & NECK SURGERY      IR FINE NEEDLE ASPIRATION W ULTRASOUND  11/11/2020    SINUS SURGERY      TONSILLECTOMY & ADENOIDECTOMY       Current Outpatient Medications   Medication Sig Dispense Refill    albuterol (PROAIR HFA/PROVENTIL HFA/VENTOLIN HFA) 108 (90 Base) MCG/ACT inhaler Inhale 2  "puffs into the lungs every 4 hours as needed for shortness of breath or wheezing 18 g 11    aspirin 81 MG EC tablet Take 81 mg by mouth daily      celecoxib (CELEBREX) 100 MG capsule Take 1 capsule (100 mg) by mouth 2 times daily 60 capsule 0    HYDROcodone-acetaminophen (NORCO) 5-325 MG tablet Take 1 tablet by mouth every 6 hours as needed for pain 25 tablet 0    losartan (COZAAR) 100 MG tablet Take 1 tablet (100 mg) by mouth daily 90 tablet 3    MULTI-VITAMIN OR TABS 1 TABLET DAILY      Pregabalin (LYRICA) 200 MG capsule Take 1 capsule (200 mg) by mouth 3 times daily 270 capsule 1    sertraline (ZOLOFT) 100 MG tablet Take 1 tablet (100 mg) by mouth daily 90 tablet 3    simvastatin (ZOCOR) 40 MG tablet Take 1 tablet (40 mg) by mouth at bedtime 90 tablet 3    SPIRIVA HANDIHALER 18 MCG inhaled capsule Inhale 1 capsule (18 mcg) into the lungs daily 3 month supply 90 capsule 3    tiZANidine (ZANAFLEX) 4 MG tablet Take 1 tablet (4 mg) by mouth 3 times daily as needed for muscle spasms 90 tablet 0    PROAIR RESPICLICK 108 (90 Base) MCG/ACT inhaler INHALE 2 PUFFS INTO THE LUNGS EVERY 6 HOURS AS NEEDED FOR SHORTNESS OF BREATH OR DIFFICULT BREATHING OR WHEEZING 1 each 5       Allergies   Allergen Reactions    Ace Inhibitors Cough        Social History     Tobacco Use    Smoking status: Former     Packs/day: 1.00     Years: 21.00     Additional pack years: 0.00     Total pack years: 21.00     Types: Cigarettes     Quit date: 2023     Years since quittin.6    Smokeless tobacco: Never   Substance Use Topics    Alcohol use: No        History   Drug Use No     Comment: occ gummies         Objective     /74 (BP Location: Left arm, Patient Position: Chair, Cuff Size: Adult Regular)   Pulse 85   Temp 98.5  F (36.9  C) (Temporal)   Resp 16   Ht 1.753 m (5' 9\")   Wt 72.3 kg (159 lb 8 oz)   SpO2 95%   BMI 23.55 kg/m      Physical Exam  GENERAL APPEARANCE: healthy, alert and no distress  HENT: ear canals and TM's " normal and nose and mouth without ulcers or lesions  RESP: lungs clear to auscultation - no rales, rhonchi or wheezes  CV: regular rate and rhythm, normal S1 S2, no S3 or S4 and no murmur, click or rub   ABDOMEN: soft, nontender, no HSM or masses and bowel sounds normal  NEURO: Normal strength and tone, sensory exam grossly normal, mentation intact and speech normal    Recent Labs   Lab Test 11/03/23  0731 04/12/22  0724 03/18/22  0833   HGB 14.4  --  16.4     --  276     --  139   POTASSIUM 4.9  --  4.3   CR 0.76  --  0.80   A1C 6.1* 6.1*  --         Diagnostics:    Had EKG done 4-19-23 at [facility], NSR, no ischemia    See labs  done a few weeks  ago early November 2023    Patient has chest xray and pulmonary  consult earlier this year.  See in chart.      ASSESSMENT / PLAN:  (Z01.818) Preop general physical exam  (primary encounter diagnosis)  Comment: patient to have lumbar spine procedure.    Plan: should be fine for procedure.       (M54.50,  M79.604) Low back pain radiating to right leg  Comment: as above   Plan: as above     (R29.898) Right leg weakness  Comment: as above   Plan: as above       Patient will hold the celebrex and aspirin for one wek prior to procedure ( patient already stopped aspirin )    He did need refill of the pain med     Hold losartan morning of procedure    Patient has copd but stable on inhalers. Quit smoking earlier this year.      I reviewed the patient's medications, allergies, medical history, family history, and social history.    David Morse MD          Revised Cardiac Risk Index (RCRI):  The patient has the following serious cardiovascular risks for perioperative complications:   - No serious cardiac risks = 0 points     RCRI Interpretation: 0 points: Class I (very low risk - 0.4% complication rate)         Signed Electronically by: David Morse MD  Copy of this evaluation report is provided to requesting physician.

## 2023-11-27 ENCOUNTER — THERAPY VISIT (OUTPATIENT)
Dept: PHYSICAL THERAPY | Facility: CLINIC | Age: 63
End: 2023-11-27
Attending: STUDENT IN AN ORGANIZED HEALTH CARE EDUCATION/TRAINING PROGRAM
Payer: COMMERCIAL

## 2023-11-27 DIAGNOSIS — R29.898 RIGHT LEG WEAKNESS: ICD-10-CM

## 2023-11-27 DIAGNOSIS — M54.10 RADICULAR LEG PAIN: ICD-10-CM

## 2023-11-27 PROCEDURE — 97110 THERAPEUTIC EXERCISES: CPT | Mod: GP | Performed by: PHYSICAL THERAPIST

## 2023-11-27 PROCEDURE — 97161 PT EVAL LOW COMPLEX 20 MIN: CPT | Mod: GP | Performed by: PHYSICAL THERAPIST

## 2023-11-27 PROCEDURE — 97112 NEUROMUSCULAR REEDUCATION: CPT | Mod: GP | Performed by: PHYSICAL THERAPIST

## 2023-11-27 NOTE — PROGRESS NOTES
"PHYSICAL THERAPY EVALUATION  Type of Visit: Evaluation    See electronic medical record for Abuse and Falls Screening details.    Subjective Patient reports having chronic low back pain for many years. He reports 2 months ago he's lifting a fuel tank in his truck and he twisted and \"tweaked\" his back. He reports lifting a bundle of wood soon after that that caused a significant increase in his back pain that progressed down his right leg. He reports the pain has not improved since the onset and now he is noticing some right foot weakness which is noticeable when he is walking and playing the drums.  He denies numbness and tingling in his leg.He reports an increase in pain when lying down, regardless if he is on his back or sides. He reports waking up numerous times most nights with shooting pains in his right leg.    He reports having a steroid injection in his lumbar spine 2 weeks ago which was helpful for 2 days.     He reports sitting hurts unless he is sitting with his vibrating heating pad. He reports bending forward reduces symptoms.  He also reports taking Lyrica, Vicodin and Celebrex daily which helps reduce his symptoms.    Patient is scheduled for lumbar spine surgery in December 2023.           Presenting condition or subjective complaint: Herniated disc causing back and right leg pain.  Date of onset:      Relevant medical history: COPD; High blood pressure; Pain at night or rest   Dates & types of surgery: None    Prior diagnostic imaging/testing results: MRI     Prior therapy history for the same diagnosis, illness or injury: No      Prior Level of Function  Transfers:   Ambulation:   ADL:   IADL:     Living Environment  Social support: With a significant other or spouse   Type of home: House; 2-story   Stairs to enter the home: No       Ramp: No   Stairs inside the home: Yes 14     Help at home: Other  Equipment owned:       Employment: Yes   Hobbies/Interests: Camping    Patient goals for " therapy: Walk without pain    Pain assessment: Location: Right side of lumbar spine/Ratin/10     Objective   LUMBAR SPINE EVALUATION  PAIN: Pain Level with Use: 5/10  INTEGUMENTARY (edema, incisions):   POSTURE: Standing Posture: Rounded shoulders, Forward head  Sitting Posture: Rounded shoulders, Forward head  GAIT:   Weightbearing Status:   Assistive Device(s):   Gait Deviations:   BALANCE/PROPRIOCEPTION:   WEIGHTBEARING ALIGNMENT:   NON-WEIGHTBEARING ALIGNMENT:    ROM:  lumbar flexion: finger tips to mid lower leg, lumbar extension: significant limitation and painful, lumbar sidebend to the right: finger tips to the mid thigh, sidebend left: finger tips to mid thigh and painful, rotation WNL and painfree bilaterally  PELVIC/SI SCREEN: WNL  STRENGTH:  grossly 5/5 throughout bilateral lower extremities except right ankle dorsiflexion 4+/5      DTR S:   CORD SIGNS:   DERMATOMES: WNL  NEURAL TENSION: Lumbar WNL       PELVIS/SI SPECIAL TESTS: WNL  FUNCTIONAL TESTS:   PALPATION:  no tenderness with palpation throughout the lumbar spine  SPINAL SEGMENTAL CONCLUSIONS:  hypomobile at L2/3/4/5      Assessment & Plan   CLINICAL IMPRESSIONS  Medical Diagnosis:      Treatment Diagnosis:     Impression/Assessment: Patient is a 63 year old male with low back complaints.  The following significant findings have been identified: Pain, Decreased ROM/flexibility, Decreased joint mobility, Decreased strength, Decreased activity tolerance, and Impaired posture. These impairments interfere with their ability to perform self care tasks, work tasks, recreational activities, household chores, driving , household mobility, and community mobility as compared to previous level of function.     Clinical Decision Making (Complexity):  Clinical Presentation: Stable/Uncomplicated  Clinical Presentation Rationale: based on medical and personal factors listed in PT evaluation  Clinical Decision Making (Complexity): Low complexity    PLAN OF  CARE  Treatment Interventions:  Interventions: Manual Therapy, Neuromuscular Re-education, Therapeutic Activity, Therapeutic Exercise    Long Term Goals            Frequency of Treatment:    Duration of Treatment:      Recommended Referrals to Other Professionals:   Education Assessment:        Risks and benefits of evaluation/treatment have been explained.   Patient/Family/caregiver agrees with Plan of Care.     Evaluation Time:             Signing Clinician: Garett Aleman, PT

## 2023-11-28 ENCOUNTER — TELEPHONE (OUTPATIENT)
Dept: GASTROENTEROLOGY | Facility: CLINIC | Age: 63
End: 2023-11-28
Payer: COMMERCIAL

## 2023-11-28 NOTE — TELEPHONE ENCOUNTER
Call placed to patient after reviewing chart.  Per chart review writer could not locate why the patient would be coming back after having a colonoscopy in April 2022.      Patient states he is having back surgery on December 18. Also he said his back problems started after the colonoscopy was scheduled.    States he is unsure why the colonoscopy was scheduled for December also.  States would like to cancel this for now and talk to his family practice doctor about when is a good time to reschedule after his back surgery.    Message sent to scheduling on patient's behalf.  Patient states that he will reschedule at a later date and call scheduling when he is ready.    Carola Garner RN  Endoscopy Procedure Pre Assessment RN  [phone] option 4

## 2023-11-28 NOTE — TELEPHONE ENCOUNTER
Reason for Reschedule/Cancellation (please be detailed, any staff messages or encounters to note?):     ----- Message from Carola Garner RN sent at 11/28/2023 10:37 AM CST -----  Regarding: Cancel  Hello,    Please cancel colonoscopy per patient request. He is having back surgery on Dec 18 and also needs to talk with his PCP to inquire about when he should reschedule - states will call to reschedule in the near future.    Thank you,    Carola      Prior to reschedule please review:  Ordering Provider:  David Morse MD  Sedation per order: Moderate  Does patient have any ASC Exclusions, please identify?: Will need clinical review if scheduling at ASC regarding breathing problems      Notes on Cancelled Procedure:  Procedure: Lower Endoscopy [Colonoscopy]   Date: 12/08  Location: [facility]; [address] [place] [address], [address] Floor, [place] [address]  Surgeon: Krishna      Rescheduled: No - Case in depot

## 2023-12-06 ENCOUNTER — TELEPHONE (OUTPATIENT)
Dept: FAMILY MEDICINE | Facility: CLINIC | Age: 63
End: 2023-12-06
Payer: COMMERCIAL

## 2023-12-06 DIAGNOSIS — J44.9 CHRONIC OBSTRUCTIVE PULMONARY DISEASE, UNSPECIFIED COPD TYPE (H): ICD-10-CM

## 2023-12-06 NOTE — TELEPHONE ENCOUNTER
"Received fax requesting  a generic alternative.  Spiriva 18mcg caps 30s handihaler  Message:  \"This prescription is available in a cost saving generic alternative.  We are requesting authorization to rewrite this prescription as the generic.  We welcome the opportunity to provide information that can lower the costs of prescriptions and health care for your patient. \"    Rakesh Altman MA on 12/6/2023 at 3:58 PM    "

## 2023-12-07 RX ORDER — TIOTROPIUM BROMIDE 18 UG/1
18 CAPSULE ORAL; RESPIRATORY (INHALATION) DAILY
Qty: 90 CAPSULE | Refills: 3 | Status: SHIPPED | OUTPATIENT
Start: 2023-12-07

## 2023-12-11 ENCOUNTER — THERAPY VISIT (OUTPATIENT)
Dept: PHYSICAL THERAPY | Facility: CLINIC | Age: 63
End: 2023-12-11
Payer: COMMERCIAL

## 2023-12-11 DIAGNOSIS — R29.898 RIGHT LEG WEAKNESS: Primary | ICD-10-CM

## 2023-12-11 DIAGNOSIS — M54.10 RADICULAR LEG PAIN: ICD-10-CM

## 2023-12-11 PROCEDURE — 97110 THERAPEUTIC EXERCISES: CPT | Mod: GP | Performed by: PHYSICAL THERAPIST

## 2023-12-12 NOTE — TELEPHONE ENCOUNTER
Pt is still not prepared to reschedule at this time and will call back at a later date after his surgery    Removed case from depot

## 2023-12-18 ENCOUNTER — HOSPITAL ENCOUNTER (OUTPATIENT)
Facility: CLINIC | Age: 63
Discharge: HOME | End: 2023-12-18
Attending: STUDENT IN AN ORGANIZED HEALTH CARE EDUCATION/TRAINING PROGRAM | Admitting: STUDENT IN AN ORGANIZED HEALTH CARE EDUCATION/TRAINING PROGRAM
Payer: COMMERCIAL

## 2023-12-18 ENCOUNTER — ANESTHESIA (OUTPATIENT)
Dept: SURGERY | Facility: CLINIC | Age: 63
End: 2023-12-18
Payer: COMMERCIAL

## 2023-12-18 ENCOUNTER — ANESTHESIA EVENT (OUTPATIENT)
Dept: SURGERY | Facility: CLINIC | Age: 63
End: 2023-12-18
Payer: COMMERCIAL

## 2023-12-18 ENCOUNTER — APPOINTMENT (OUTPATIENT)
Dept: GENERAL RADIOLOGY | Facility: CLINIC | Age: 63
End: 2023-12-18
Attending: STUDENT IN AN ORGANIZED HEALTH CARE EDUCATION/TRAINING PROGRAM
Payer: COMMERCIAL

## 2023-12-18 VITALS
WEIGHT: 156.4 LBS | RESPIRATION RATE: 16 BRPM | HEART RATE: 76 BPM | DIASTOLIC BLOOD PRESSURE: 83 MMHG | OXYGEN SATURATION: 93 % | BODY MASS INDEX: 23.1 KG/M2 | SYSTOLIC BLOOD PRESSURE: 117 MMHG | TEMPERATURE: 97.7 F

## 2023-12-18 DIAGNOSIS — Z98.890 HISTORY OF LUMBAR SURGERY: Primary | ICD-10-CM

## 2023-12-18 LAB
APTT PPP: 33 SECONDS (ref 22–38)
ERYTHROCYTE [DISTWIDTH] IN BLOOD BY AUTOMATED COUNT: 13.6 % (ref 10–15)
HCT VFR BLD AUTO: 41.8 % (ref 40–53)
HGB BLD-MCNC: 13.6 G/DL (ref 13.3–17.7)
INR PPP: 1.02 (ref 0.85–1.15)
MCH RBC QN AUTO: 30.8 PG (ref 26.5–33)
MCHC RBC AUTO-ENTMCNC: 32.5 G/DL (ref 31.5–36.5)
MCV RBC AUTO: 95 FL (ref 78–100)
PLATELET # BLD AUTO: 297 10E3/UL (ref 150–450)
RBC # BLD AUTO: 4.41 10E6/UL (ref 4.4–5.9)
WBC # BLD AUTO: 10.2 10E3/UL (ref 4–11)

## 2023-12-18 PROCEDURE — 85730 THROMBOPLASTIN TIME PARTIAL: CPT | Performed by: STUDENT IN AN ORGANIZED HEALTH CARE EDUCATION/TRAINING PROGRAM

## 2023-12-18 PROCEDURE — 250N000011 HC RX IP 250 OP 636: Performed by: STUDENT IN AN ORGANIZED HEALTH CARE EDUCATION/TRAINING PROGRAM

## 2023-12-18 PROCEDURE — 250N000005 HC OR RX SURGIFLO HEMOSTATIC MATRIX 10ML 199102S OPNP: Performed by: STUDENT IN AN ORGANIZED HEALTH CARE EDUCATION/TRAINING PROGRAM

## 2023-12-18 PROCEDURE — 85027 COMPLETE CBC AUTOMATED: CPT | Performed by: STUDENT IN AN ORGANIZED HEALTH CARE EDUCATION/TRAINING PROGRAM

## 2023-12-18 PROCEDURE — 258N000003 HC RX IP 258 OP 636: Performed by: NURSE ANESTHETIST, CERTIFIED REGISTERED

## 2023-12-18 PROCEDURE — 272N000001 HC OR GENERAL SUPPLY STERILE: Performed by: STUDENT IN AN ORGANIZED HEALTH CARE EDUCATION/TRAINING PROGRAM

## 2023-12-18 PROCEDURE — 250N000011 HC RX IP 250 OP 636: Mod: JZ | Performed by: NURSE ANESTHETIST, CERTIFIED REGISTERED

## 2023-12-18 PROCEDURE — 250N000011 HC RX IP 250 OP 636: Performed by: NURSE ANESTHETIST, CERTIFIED REGISTERED

## 2023-12-18 PROCEDURE — 999N000141 HC STATISTIC PRE-PROCEDURE NURSING ASSESSMENT: Performed by: STUDENT IN AN ORGANIZED HEALTH CARE EDUCATION/TRAINING PROGRAM

## 2023-12-18 PROCEDURE — 360N000084 HC SURGERY LEVEL 4 W/ FLUORO, PER MIN: Performed by: STUDENT IN AN ORGANIZED HEALTH CARE EDUCATION/TRAINING PROGRAM

## 2023-12-18 PROCEDURE — 258N000001 HC RX 258: Performed by: STUDENT IN AN ORGANIZED HEALTH CARE EDUCATION/TRAINING PROGRAM

## 2023-12-18 PROCEDURE — 250N000025 HC SEVOFLURANE, PER MIN: Performed by: STUDENT IN AN ORGANIZED HEALTH CARE EDUCATION/TRAINING PROGRAM

## 2023-12-18 PROCEDURE — 250N000009 HC RX 250: Performed by: STUDENT IN AN ORGANIZED HEALTH CARE EDUCATION/TRAINING PROGRAM

## 2023-12-18 PROCEDURE — 250N000013 HC RX MED GY IP 250 OP 250 PS 637: Performed by: ANESTHESIOLOGY

## 2023-12-18 PROCEDURE — 250N000009 HC RX 250: Performed by: NURSE ANESTHETIST, CERTIFIED REGISTERED

## 2023-12-18 PROCEDURE — 710N000009 HC RECOVERY PHASE 1, LEVEL 1, PER MIN: Performed by: STUDENT IN AN ORGANIZED HEALTH CARE EDUCATION/TRAINING PROGRAM

## 2023-12-18 PROCEDURE — 85610 PROTHROMBIN TIME: CPT | Performed by: STUDENT IN AN ORGANIZED HEALTH CARE EDUCATION/TRAINING PROGRAM

## 2023-12-18 PROCEDURE — 370N000017 HC ANESTHESIA TECHNICAL FEE, PER MIN: Performed by: STUDENT IN AN ORGANIZED HEALTH CARE EDUCATION/TRAINING PROGRAM

## 2023-12-18 PROCEDURE — 710N000012 HC RECOVERY PHASE 2, PER MINUTE: Performed by: STUDENT IN AN ORGANIZED HEALTH CARE EDUCATION/TRAINING PROGRAM

## 2023-12-18 PROCEDURE — 63030 LAMOT DCMPRN NRV RT 1 LMBR: CPT | Mod: RT | Performed by: STUDENT IN AN ORGANIZED HEALTH CARE EDUCATION/TRAINING PROGRAM

## 2023-12-18 PROCEDURE — 999N000179 XR SURGERY CARM FLUORO LESS THAN 5 MIN W STILLS: Mod: TC

## 2023-12-18 PROCEDURE — 36415 COLL VENOUS BLD VENIPUNCTURE: CPT | Performed by: STUDENT IN AN ORGANIZED HEALTH CARE EDUCATION/TRAINING PROGRAM

## 2023-12-18 RX ORDER — DEXAMETHASONE SODIUM PHOSPHATE 4 MG/ML
INJECTION, SOLUTION INTRA-ARTICULAR; INTRALESIONAL; INTRAMUSCULAR; INTRAVENOUS; SOFT TISSUE PRN
Status: DISCONTINUED | OUTPATIENT
Start: 2023-12-18 | End: 2023-12-18

## 2023-12-18 RX ORDER — LIDOCAINE HYDROCHLORIDE 20 MG/ML
INJECTION, SOLUTION INFILTRATION; PERINEURAL PRN
Status: DISCONTINUED | OUTPATIENT
Start: 2023-12-18 | End: 2023-12-18

## 2023-12-18 RX ORDER — BUPIVACAINE HYDROCHLORIDE AND EPINEPHRINE 2.5; 5 MG/ML; UG/ML
INJECTION, SOLUTION INFILTRATION; PERINEURAL PRN
Status: DISCONTINUED | OUTPATIENT
Start: 2023-12-18 | End: 2023-12-18 | Stop reason: HOSPADM

## 2023-12-18 RX ORDER — OXYCODONE HYDROCHLORIDE 5 MG/1
5 TABLET ORAL
Status: DISCONTINUED | OUTPATIENT
Start: 2023-12-18 | End: 2023-12-18 | Stop reason: HOSPADM

## 2023-12-18 RX ORDER — ACETAMINOPHEN 325 MG/1
975 TABLET ORAL ONCE
Status: COMPLETED | OUTPATIENT
Start: 2023-12-18 | End: 2023-12-18

## 2023-12-18 RX ORDER — OXYCODONE HYDROCHLORIDE 5 MG/1
5 TABLET ORAL EVERY 6 HOURS PRN
Qty: 30 TABLET | Refills: 0 | Status: SHIPPED | OUTPATIENT
Start: 2023-12-18 | End: 2024-03-04

## 2023-12-18 RX ORDER — METHADONE HYDROCHLORIDE 10 MG/ML
2 INJECTION, SOLUTION INTRAMUSCULAR; INTRAVENOUS; SUBCUTANEOUS 3 TIMES DAILY PRN
Status: DISCONTINUED | OUTPATIENT
Start: 2023-12-18 | End: 2023-12-18 | Stop reason: HOSPADM

## 2023-12-18 RX ORDER — ONDANSETRON 2 MG/ML
4 INJECTION INTRAMUSCULAR; INTRAVENOUS EVERY 30 MIN PRN
Status: DISCONTINUED | OUTPATIENT
Start: 2023-12-18 | End: 2023-12-18 | Stop reason: HOSPADM

## 2023-12-18 RX ORDER — ONDANSETRON 4 MG/1
4 TABLET, ORALLY DISINTEGRATING ORAL EVERY 30 MIN PRN
Status: DISCONTINUED | OUTPATIENT
Start: 2023-12-18 | End: 2023-12-18 | Stop reason: HOSPADM

## 2023-12-18 RX ORDER — EPHEDRINE SULFATE 50 MG/ML
INJECTION, SOLUTION INTRAMUSCULAR; INTRAVENOUS; SUBCUTANEOUS PRN
Status: DISCONTINUED | OUTPATIENT
Start: 2023-12-18 | End: 2023-12-18

## 2023-12-18 RX ORDER — CEFAZOLIN SODIUM/WATER 2 G/20 ML
2 SYRINGE (ML) INTRAVENOUS EVERY 8 HOURS
Status: DISCONTINUED | OUTPATIENT
Start: 2023-12-18 | End: 2023-12-18 | Stop reason: HOSPADM

## 2023-12-18 RX ORDER — SODIUM CHLORIDE, SODIUM LACTATE, POTASSIUM CHLORIDE, CALCIUM CHLORIDE 600; 310; 30; 20 MG/100ML; MG/100ML; MG/100ML; MG/100ML
INJECTION, SOLUTION INTRAVENOUS CONTINUOUS PRN
Status: DISCONTINUED | OUTPATIENT
Start: 2023-12-18 | End: 2023-12-18

## 2023-12-18 RX ORDER — KETOROLAC TROMETHAMINE 30 MG/ML
INJECTION, SOLUTION INTRAMUSCULAR; INTRAVENOUS PRN
Status: DISCONTINUED | OUTPATIENT
Start: 2023-12-18 | End: 2023-12-18

## 2023-12-18 RX ORDER — LIDOCAINE 40 MG/G
CREAM TOPICAL
Status: DISCONTINUED | OUTPATIENT
Start: 2023-12-18 | End: 2023-12-18 | Stop reason: HOSPADM

## 2023-12-18 RX ORDER — SODIUM CHLORIDE, SODIUM LACTATE, POTASSIUM CHLORIDE, CALCIUM CHLORIDE 600; 310; 30; 20 MG/100ML; MG/100ML; MG/100ML; MG/100ML
INJECTION, SOLUTION INTRAVENOUS CONTINUOUS
Status: DISCONTINUED | OUTPATIENT
Start: 2023-12-18 | End: 2023-12-18 | Stop reason: HOSPADM

## 2023-12-18 RX ORDER — MAGNESIUM SULFATE HEPTAHYDRATE 40 MG/ML
2 INJECTION, SOLUTION INTRAVENOUS
Status: DISCONTINUED | OUTPATIENT
Start: 2023-12-18 | End: 2023-12-18 | Stop reason: HOSPADM

## 2023-12-18 RX ORDER — ONDANSETRON 2 MG/ML
INJECTION INTRAMUSCULAR; INTRAVENOUS PRN
Status: DISCONTINUED | OUTPATIENT
Start: 2023-12-18 | End: 2023-12-18

## 2023-12-18 RX ORDER — HYDRALAZINE HYDROCHLORIDE 20 MG/ML
10 INJECTION INTRAMUSCULAR; INTRAVENOUS EVERY 10 MIN PRN
Status: DISCONTINUED | OUTPATIENT
Start: 2023-12-18 | End: 2023-12-18 | Stop reason: HOSPADM

## 2023-12-18 RX ORDER — DIPHENHYDRAMINE HCL 25 MG
25 CAPSULE ORAL EVERY 6 HOURS PRN
Status: DISCONTINUED | OUTPATIENT
Start: 2023-12-18 | End: 2023-12-18 | Stop reason: HOSPADM

## 2023-12-18 RX ORDER — METHADONE HYDROCHLORIDE 10 MG/ML
INJECTION, SOLUTION INTRAMUSCULAR; INTRAVENOUS; SUBCUTANEOUS PRN
Status: DISCONTINUED | OUTPATIENT
Start: 2023-12-18 | End: 2023-12-18

## 2023-12-18 RX ORDER — DIPHENHYDRAMINE HYDROCHLORIDE 50 MG/ML
25 INJECTION INTRAMUSCULAR; INTRAVENOUS EVERY 6 HOURS PRN
Status: DISCONTINUED | OUTPATIENT
Start: 2023-12-18 | End: 2023-12-18 | Stop reason: HOSPADM

## 2023-12-18 RX ORDER — PROPOFOL 10 MG/ML
INJECTION, EMULSION INTRAVENOUS PRN
Status: DISCONTINUED | OUTPATIENT
Start: 2023-12-18 | End: 2023-12-18

## 2023-12-18 RX ORDER — LABETALOL HYDROCHLORIDE 5 MG/ML
10 INJECTION, SOLUTION INTRAVENOUS
Status: DISCONTINUED | OUTPATIENT
Start: 2023-12-18 | End: 2023-12-18 | Stop reason: HOSPADM

## 2023-12-18 RX ORDER — IBUPROFEN 200 MG
400-600 TABLET ORAL EVERY 6 HOURS PRN
Status: ON HOLD | COMMUNITY
End: 2023-12-18

## 2023-12-18 RX ORDER — BUPIVACAINE HYDROCHLORIDE AND EPINEPHRINE 2.5; 5 MG/ML; UG/ML
30 INJECTION, SOLUTION EPIDURAL; INFILTRATION; INTRACAUDAL; PERINEURAL ONCE
Status: DISCONTINUED | OUTPATIENT
Start: 2023-12-18 | End: 2023-12-18 | Stop reason: HOSPADM

## 2023-12-18 RX ORDER — FENTANYL CITRATE 50 UG/ML
INJECTION, SOLUTION INTRAMUSCULAR; INTRAVENOUS PRN
Status: DISCONTINUED | OUTPATIENT
Start: 2023-12-18 | End: 2023-12-18

## 2023-12-18 RX ORDER — SENNA AND DOCUSATE SODIUM 50; 8.6 MG/1; MG/1
1 TABLET, FILM COATED ORAL AT BEDTIME
Qty: 28 TABLET | Refills: 0 | Status: SHIPPED | OUTPATIENT
Start: 2023-12-18 | End: 2024-03-04

## 2023-12-18 RX ORDER — METHOCARBAMOL 500 MG/1
500 TABLET, FILM COATED ORAL
Status: DISCONTINUED | OUTPATIENT
Start: 2023-12-18 | End: 2023-12-18 | Stop reason: HOSPADM

## 2023-12-18 RX ORDER — ALBUTEROL SULFATE 0.83 MG/ML
2.5 SOLUTION RESPIRATORY (INHALATION) EVERY 4 HOURS PRN
Status: DISCONTINUED | OUTPATIENT
Start: 2023-12-18 | End: 2023-12-18 | Stop reason: HOSPADM

## 2023-12-18 RX ORDER — ACETAMINOPHEN 325 MG/1
650 TABLET ORAL
Status: DISCONTINUED | OUTPATIENT
Start: 2023-12-18 | End: 2023-12-18 | Stop reason: HOSPADM

## 2023-12-18 RX ORDER — METHOCARBAMOL 500 MG/1
500 TABLET, FILM COATED ORAL 4 TIMES DAILY PRN
Qty: 30 TABLET | Refills: 0 | Status: SHIPPED | OUTPATIENT
Start: 2023-12-18 | End: 2024-03-04

## 2023-12-18 RX ORDER — KETOROLAC TROMETHAMINE 15 MG/ML
15 INJECTION, SOLUTION INTRAMUSCULAR; INTRAVENOUS
Status: DISCONTINUED | OUTPATIENT
Start: 2023-12-18 | End: 2023-12-18 | Stop reason: HOSPADM

## 2023-12-18 RX ADMIN — METHADONE HYDROCHLORIDE 10 MG: 10 INJECTION, SOLUTION INTRAMUSCULAR; INTRAVENOUS; SUBCUTANEOUS at 16:09

## 2023-12-18 RX ADMIN — PHENYLEPHRINE HYDROCHLORIDE 100 MCG: 10 INJECTION INTRAVENOUS at 17:12

## 2023-12-18 RX ADMIN — DEXAMETHASONE SODIUM PHOSPHATE 8 MG: 4 INJECTION, SOLUTION INTRA-ARTICULAR; INTRALESIONAL; INTRAMUSCULAR; INTRAVENOUS; SOFT TISSUE at 16:09

## 2023-12-18 RX ADMIN — ONDANSETRON 4 MG: 2 INJECTION INTRAMUSCULAR; INTRAVENOUS at 16:09

## 2023-12-18 RX ADMIN — PROPOFOL 160 MG: 10 INJECTION, EMULSION INTRAVENOUS at 16:09

## 2023-12-18 RX ADMIN — Medication 10 MG: at 16:30

## 2023-12-18 RX ADMIN — ROCURONIUM BROMIDE 10 MG: 50 INJECTION, SOLUTION INTRAVENOUS at 16:30

## 2023-12-18 RX ADMIN — ACETAMINOPHEN 975 MG: 325 TABLET, FILM COATED ORAL at 15:02

## 2023-12-18 RX ADMIN — SODIUM CHLORIDE, POTASSIUM CHLORIDE, SODIUM LACTATE AND CALCIUM CHLORIDE: 600; 310; 30; 20 INJECTION, SOLUTION INTRAVENOUS at 16:06

## 2023-12-18 RX ADMIN — ROCURONIUM BROMIDE 40 MG: 50 INJECTION, SOLUTION INTRAVENOUS at 16:09

## 2023-12-18 RX ADMIN — Medication 2 G: at 16:06

## 2023-12-18 RX ADMIN — SODIUM CHLORIDE, POTASSIUM CHLORIDE, SODIUM LACTATE AND CALCIUM CHLORIDE: 600; 310; 30; 20 INJECTION, SOLUTION INTRAVENOUS at 17:14

## 2023-12-18 RX ADMIN — Medication 5 MG: at 16:49

## 2023-12-18 RX ADMIN — SUGAMMADEX 200 MG: 100 INJECTION, SOLUTION INTRAVENOUS at 17:21

## 2023-12-18 RX ADMIN — KETOROLAC TROMETHAMINE 15 MG: 30 INJECTION, SOLUTION INTRAMUSCULAR at 16:09

## 2023-12-18 RX ADMIN — MIDAZOLAM 2 MG: 1 INJECTION INTRAMUSCULAR; INTRAVENOUS at 16:06

## 2023-12-18 RX ADMIN — LIDOCAINE HYDROCHLORIDE 50 MG: 20 INJECTION, SOLUTION INFILTRATION; PERINEURAL at 16:09

## 2023-12-18 RX ADMIN — DEXMEDETOMIDINE HYDROCHLORIDE 0.5 MCG/KG/HR: 100 INJECTION, SOLUTION INTRAVENOUS at 16:24

## 2023-12-18 ASSESSMENT — ACTIVITIES OF DAILY LIVING (ADL)
ADLS_ACUITY_SCORE: 35

## 2023-12-18 ASSESSMENT — LIFESTYLE VARIABLES: TOBACCO_USE: 1

## 2023-12-18 ASSESSMENT — COPD QUESTIONNAIRES: COPD: 1

## 2023-12-18 NOTE — OP NOTE
Location: Main OR  Attending Surgeon: Konrad Huang MD  Assistant Surgeon: NEENA Bustillo  Preprocedure diagnosis: lumbar radiculopathy  Postprocedure diagnosis: Same  Procedures:  1.  Right lumbar 4-5 minimally invasive laminotomy and discectomy  2.  Use of intraoperative fluoroscopy  3.  Use of intraoperative microscope  Indications: The patient is a 63-year-old male with right leg pain and weakness.  On imaging he has an L4-5 foraminal disc herniation compressing the exiting L4 nerve root.  Given the weakness he is indicated for decompression.  Procedure details:  The patient is brought to the main operating room, intubated and placed under general anesthesia.  He is placed on the operating table in the prone position with his arms above his head and all pressure points padded.  His lower lumbar spine was cleaned and prepped in sterile fashion.  A spinal needle was used with fluoroscopy to verify level.  We then marked out a right-sided paracentral incision.  Local anesthetic was injected following a timeout.  Using a 10 blade incision was made through the skin.  We then use sequential dilators down to the pars of L4 on the right side.  We then removed the soft tissue overlying the bone.  The operating microscope was then brought in.  We verified her level once again using fluoroscopy.  We then remove the bone at the superior edge of the facet joint and over the pars.  This was taken down into the neuroforamen until the nerve root was identified.  We then looked inferiorly and found the disc herniation.  We opened this using a down-biting curette and a piece of herniated disc material was removed.  Sweeping with a ball-tipped probe and a down-biting curette we found no other areas of stenosis and no further disc material.  I then irrigated with normal saline.  Any soft tissue bleeding was coagulated using bipolar cautery.  We then remove the sequential dilators and closed the muscle and fascial layers using  0 Vicryl sutures followed by 2-0 Vicryl sutures for the dermal layer and 4-0 Monocryl suture with a skin glue for the skin.  All counts were correct at the end the procedure.  The patient tolerated the procedure well.  I was present for the entire procedure except for final closure which I was immediately available for.  NEENA Bustillo was present for opening, closure and retraction during the procedure as no qualified resident was available.    Konrad Huang MD

## 2023-12-18 NOTE — ANESTHESIA PROCEDURE NOTES
Airway       Patient location during procedure: OR       Procedure Start/Stop Times: 12/18/2023 4:14 PM  Staff -        Anesthesiologist:  Nazia Duran APRN CRNA       Performed By: CRNA  Consent for Airway        Urgency: elective  Indications and Patient Condition       Indications for airway management: ernie-procedural       Induction type:intravenous       Mask difficulty assessment: 1 - vent by mask    Final Airway Details       Final airway type: endotracheal airway       Successful airway: ETT - single and Oral  Endotracheal Airway Details        Cuffed: yes       Successful intubation technique: direct laryngoscopy       DL Blade Type: Martínez 3       Grade View of Cords: 1       Adjucts: stylet       Position: Right       Bite block used: Soft    Post intubation assessment        Placement verified by: capnometry, equal breath sounds and chest rise        Number of attempts at approach: 1       Secured with: tape       Ease of procedure: easy       Dentition: Intact    Medication(s) Administered   Medication Administration Time: 12/18/2023 4:14 PM

## 2023-12-18 NOTE — ANESTHESIA CARE TRANSFER NOTE
Patient: Alexey Reed    Procedure: Procedure(s):  Right Lumbar 4-5 Laminotomy and Discectomy       Diagnosis: Right leg weakness [R29.898]  Radicular leg pain [M54.10]  Diagnosis Additional Information: No value filed.    Anesthesia Type:   General     Note:    Oropharynx: oral airway in place  Level of Consciousness: drowsy  Oxygen Supplementation: face mask  Level of Supplemental Oxygen (L/min / FiO2): 6    Dentition: dentition unchanged  Vital Signs Stable: post-procedure vital signs reviewed and stable  Report to RN Given: handoff report given  Patient transferred to: PACU    Handoff Report: Identifed the Patient, Identified the Reponsible Provider, Reviewed the pertinent medical history, Discussed the surgical course, Reviewed Intra-OP anesthesia mangement and issues during anesthesia, Set expectations for post-procedure period and Allowed opportunity for questions and acknowledgement of understanding      Vitals:  Vitals Value Taken Time   /75 12/18/23 1729   Temp 98.5  F (36.9  C) 12/18/23 1732   Pulse 85 12/18/23 1732   Resp 9 12/18/23 1732   SpO2 100 % 12/18/23 1732   Vitals shown include unfiled device data.    Electronically Signed By: DOUGLAS Cannon CRNA  December 18, 2023  5:33 PM

## 2023-12-18 NOTE — ANESTHESIA POSTPROCEDURE EVALUATION
Patient: Alexey Reed    Procedure: Procedure(s):  Right Lumbar 4-5 Laminotomy and Discectomy       Anesthesia Type:  General    Note:  Disposition: Outpatient   Postop Pain Control: Uneventful            Sign Out: Well controlled pain   PONV: No   Neuro/Psych: Uneventful            Sign Out: Acceptable/Baseline neuro status   Airway/Respiratory: Uneventful            Sign Out: Acceptable/Baseline resp. status   CV/Hemodynamics: Uneventful            Sign Out: Acceptable CV status   Other NRE: NONE   DID A NON-ROUTINE EVENT OCCUR? No           Last vitals:  Vitals Value Taken Time   /75 12/18/23 1729   Temp 98.5  F (36.9  C) 12/18/23 1732   Pulse 97 12/18/23 1737   Resp 10 12/18/23 1737   SpO2 99 % 12/18/23 1737   Vitals shown include unfiled device data.    Electronically Signed By: Cortez Mir MD  December 18, 2023  5:38 PM

## 2023-12-18 NOTE — BRIEF OP NOTE
[facility]    Brief Operative Note    Pre-operative diagnosis: Right leg weakness [R29.898]  Radicular leg pain [M54.10]  Post-operative diagnosis Same as pre-operative diagnosis    Procedure: Right Lumbar 4-5 Laminotomy and Discectomy, Right - Spine    Surgeon: Surgeon(s) and Role:     * Konrad Huang MD - Primary     * Santiago Fernandez PA-C - Assisting  Anesthesia: General   Estimated Blood Loss: Less than 10 ml    Drains: None  Specimens: * No specimens in log *  Findings:   None.  Complications: None.  Implants: * No implants in log *    Konrad Huang MD

## 2023-12-18 NOTE — ANESTHESIA PREPROCEDURE EVALUATION
Anesthesia Pre-Procedure Evaluation    Patient: Alexey Reed   MRN: [id number] : 1960        Procedure : Procedure(s):  Right Lumbar 4-5 Laminotomy and Discectomy          Past Medical History:   Diagnosis Date    COPD (chronic obstructive pulmonary disease) (H)     Hyperlipaemia     Neuropathic pain syndrome (non-herpetic)     Unspecified essential hypertension       Past Surgical History:   Procedure Laterality Date    COLONOSCOPY      COLONOSCOPY N/A 2022    Procedure: COLONOSCOPY, WITH POLYPECTOMY AND BIOPSY;  Surgeon: Betty Ray DO;  Location: [facility] OR    COLONOSCOPY WITH CO2 INSUFFLATION N/A 2019    Procedure: COLONOSCOPY, WITH CO2 INSUFFLATION;  Surgeon: Armin Carcamo DO;  Location: [facility] OR    COLONOSCOPY WITH CO2 INSUFFLATION N/A 2022    Procedure: COLONOSCOPY, WITH CO2 INSUFFLATION;  Surgeon: Betty Ray DO;  Location: [facility] OR    COMBINED ESOPHAGOSCOPY, GASTROSCOPY, DUODENOSCOPY (EGD) WITH CO2 INSUFFLATION N/A 2022    Procedure: ESOPHAGOGASTRODUODENOSCOPY, WITH CO2 INSUFFLATION;  Surgeon: Betty Ray DO;  Location: [facility] OR    ESOPHAGOSCOPY, GASTROSCOPY, DUODENOSCOPY (EGD), COMBINED N/A 2022    Procedure: ESOPHAGOGASTRODUODENOSCOPY, WITH BIOPSY;  Surgeon: Betty Ray DO;  Location: [facility] OR    HEAD & NECK SURGERY      IR FINE NEEDLE ASPIRATION W ULTRASOUND  2020    SINUS SURGERY      TONSILLECTOMY & ADENOIDECTOMY        No Known Allergies   Social History     Tobacco Use    Smoking status: Former     Packs/day: 1.00     Years: 21.00     Additional pack years: 0.00     Total pack years: 21.00     Types: Cigarettes     Quit date: 2023     Years since quittin.7    Smokeless tobacco: Never   Substance Use Topics    Alcohol use: No      Wt Readings from Last 1 Encounters:   23 70.9 kg (156 lb 6.4 oz)        Anesthesia Evaluation            ROS/MED HX  ENT/Pulmonary:     (+)                tobacco use, Past use,        COPD,             "  Neurologic:     (+)    peripheral neuropathy, - right leg.                           Cardiovascular:     (+) Dyslipidemia hypertension- -   -  - -                                      METS/Exercise Tolerance:     Hematologic:  - neg hematologic  ROS     Musculoskeletal:   (+)  arthritis,             GI/Hepatic:  - neg GI/hepatic ROS     Renal/Genitourinary:  - neg Renal ROS     Endo:  - neg endo ROS     Psychiatric/Substance Use:  - neg psychiatric ROS     Infectious Disease:  - neg infectious disease ROS     Malignancy:       Other:            Physical Exam    Airway        Mallampati: II   TM distance: > 3 FB   Neck ROM: full   Mouth opening: > 3 cm    Respiratory Devices and Support         Dental       (+) Modest Abnormalities - crowns, retainers, 1 or 2 missing teeth      Cardiovascular          Rhythm and rate: regular and normal     Pulmonary           breath sounds clear to auscultation           OUTSIDE LABS:  CBC:   Lab Results   Component Value Date    WBC 11.0 11/03/2023    WBC 11.9 (H) 03/18/2022    HGB 14.4 11/03/2023    HGB 16.4 03/18/2022    HCT 44.2 11/03/2023    HCT 49.2 03/18/2022     11/03/2023     03/18/2022     BMP:   Lab Results   Component Value Date     11/03/2023     03/18/2022    POTASSIUM 4.9 11/03/2023    POTASSIUM 4.3 03/18/2022    CHLORIDE 100 11/03/2023    CHLORIDE 106 03/18/2022    CO2 27 11/03/2023    CO2 28 03/18/2022    BUN 14.5 11/03/2023    BUN 11 03/18/2022    CR 0.76 11/03/2023    CR 0.80 03/18/2022     (H) 11/03/2023     (H) 04/12/2022     COAGS: No results found for: \"PTT\", \"INR\", \"FIBR\"  POC: No results found for: \"BGM\", \"HCG\", \"HCGS\"  HEPATIC:   Lab Results   Component Value Date    ALBUMIN 3.9 11/03/2023    PROTTOTAL 7.5 11/03/2023    ALT 24 11/03/2023    AST 27 11/03/2023    ALKPHOS 97 11/03/2023    BILITOTAL 0.3 11/03/2023     OTHER:   Lab Results   Component Value Date    A1C 6.1 (H) 11/03/2023    ADAM 9.7 11/03/2023    TSH 0.96 " 11/03/2023    SED 7 10/10/2011       Anesthesia Plan    ASA Status:  2    NPO Status:  NPO Appropriate    Anesthesia Type: General.     - Airway: ETT   Induction: Intravenous.   Maintenance: Balanced.   Techniques and Equipment:       - Drips/Meds: Dexmed. infusion     Consents    Anesthesia Plan(s) and associated risks, benefits, and realistic alternatives discussed. Questions answered and patient/representative(s) expressed understanding.     - Discussed:     - Discussed with:  Patient      - Extended Intubation/Ventilatory Support Discussed: No.      - Patient is DNR/DNI Status: No     Use of blood products discussed: No .     Postoperative Care    Pain management: IV analgesics, Oral pain medications, Multi-modal analgesia.     - Plan for long acting post-op opioid use   PONV prophylaxis: Dexamethasone or Solumedrol, Ondansetron (or other 5HT-3)     Comments:    Other Comments:   Methadone 10 mg  Decadron 8 mg  Zofran 4 mg  Toradol 15 mg  Precedex  Sugammadex if paralytic to be used            Cortez Mir MD    I have reviewed the pertinent notes and labs in the chart from the past 30 days and (re)examined the patient.  Any updates or changes from those notes are reflected in this note.             # Drug Induced Platelet Defect: home medication list includes an antiplatelet medication

## 2023-12-18 NOTE — DISCHARGE INSTRUCTIONS
GENERAL ANESTHESIA OR SEDATION ADULT DISCHARGE INSTRUCTIONS   SPECIAL PRECAUTIONS FOR 24 HOURS AFTER SURGERY    IT IS NOT UNUSUAL TO FEEL LIGHT-HEADED OR FAINT, UP TO 24 HOURS AFTER SURGERY OR WHILE TAKING PAIN MEDICATION.  IF YOU HAVE THESE SYMPTOMS; SIT FOR A FEW MINUTES BEFORE STANDING AND HAVE SOMEONE ASSIST YOU WHEN YOU GET UP TO WALK OR USE THE BATHROOM.    YOU SHOULD REST AND RELAX FOR THE NEXT 24 HOURS AND YOU MUST MAKE ARRANGEMENTS TO HAVE SOMEONE STAY WITH YOU FOR AT LEAST 24 HOURS AFTER YOUR DISCHARGE.  AVOID HAZARDOUS AND STRENUOUS ACTIVITIES.  DO NOT MAKE IMPORTANT DECISIONS FOR 24 HOURS.    DO NOT DRIVE ANY VEHICLE OR OPERATE MECHANICAL EQUIPMENT FOR 24 HOURS FOLLOWING THE END OF YOUR SURGERY.  EVEN THOUGH YOU MAY FEEL NORMAL, YOUR REACTIONS MAY BE AFFECTED BY THE MEDICATION YOU HAVE RECEIVED.    DO NOT DRINK ALCOHOLIC BEVERAGES FOR 24 HOURS FOLLOWING YOUR SURGERY.    DRINK CLEAR LIQUIDS (APPLE JUICE, GINGER ALE, 7-UP, BROTH, ETC.).  PROGRESS TO YOUR REGULAR DIET AS YOU FEEL ABLE.    YOU MAY HAVE A DRY MOUTH, A SORE THROAT, MUSCLES ACHES OR TROUBLE SLEEPING.  THESE SHOULD GO AWAY AFTER 24 HOURS.    CALL YOUR DOCTOR FOR ANY OF THE FOLLOWING:  SIGNS OF INFECTION (FEVER, GROWING TENDERNESS AT THE SURGERY SITE, A LARGE AMOUNT OF DRAINAGE OR BLEEDING, SEVERE PAIN, FOUL-SMELLING DRAINAGE, REDNESS OR SWELLING.    IT HAS BEEN OVER 8 TO 10 HOURS SINCE SURGERY AND YOU ARE STILL NOT ABLE TO URINATE (PASS WATER).     Maximum acetaminophen (Tylenol) dose from all sources should not exceed 4 grams (4000 mg) per day.  You last had 975 mg at 3pm; do not take Tylenol products again until after 9pm if needed.    You received Toradol, an IV form of Ibuprofen (Motrin) at 4:10pm.  Do not take any Ibuprofen products until after 10:10pm if needed.    DR. PARKER ALCANTARA M.D.                    CLINIC PHONE NUMBER:  [phone]  [facility] NEUROSURGERY

## 2023-12-21 ENCOUNTER — DOCUMENTATION ONLY (OUTPATIENT)
Dept: NEUROSURGERY | Facility: CLINIC | Age: 63
End: 2023-12-21
Payer: COMMERCIAL

## 2023-12-21 NOTE — PROGRESS NOTES
Forms Received: [facility]  Company:   Status: Signed and faxed back. Called and informed patient.   Keli Yang on 1/3/2024 at 1:07 PM

## 2024-01-03 ENCOUNTER — VIRTUAL VISIT (OUTPATIENT)
Dept: NEUROSURGERY | Facility: CLINIC | Age: 64
End: 2024-01-03
Payer: COMMERCIAL

## 2024-01-03 DIAGNOSIS — Z98.890 STATUS POST LUMBAR SURGERY: Primary | ICD-10-CM

## 2024-01-03 PROCEDURE — 99207 PR NO CHARGE NURSE ONLY: CPT | Mod: 93

## 2024-01-03 NOTE — PROGRESS NOTES
Post-op Nurse Visit:    Patient seen today per the order of  Konrad Huang MD.   DOS: 12/18/23  Procedure: Right lumbar 4-5 laminotomy and discectomy     Pain/Neuro Assessment  LBP radiating to RLE, some weakness  1/10 to low back    Numbness/tingling: no   Strength: Equal strength to bilateral lower extremities. Denies weakness.     Pain Relief Measures:  Oxycodone: denies  Ibuprofen: occasional  Robaxin: denies  Ice: no     Incision  Incision inspected. Edges well-approximated. No redness, swelling, drainage, or warmth noted.     Activity  Following restrictions   Falls:  none  Patient is walking frequently without difficulty.   Denies redness, swelling, or warmth to bilateral calves.   Wearing brace? No    GI/  Difficulty swallowing? No  Patient's appetite is normal  Bowel/bladder problems? No  Taking stool softeners? No     Refills/x-rays/return to work  Refills given at this appointment? No  Sent for x-rays after this appointment? No  Ordered future x-rays? No  Scheduling team notified?   Return to work discussed at this appointment? Yes 2 weeks of FMLA. Forms in [place]    All of patient's questions addressed today. Patient was instructed to call with any additional questions/concerns.

## 2024-01-03 NOTE — PATIENT INSTRUCTIONS
Instructions for Patient    Incision    Keep your incision clean and dry at all times.   It is okay to shower, just pat the incision dry   No submerging incision in water such as pools, hot tubs, or baths for at least 8 weeks and until the incision is healed  Do not apply lotions or ointments to incision    Activity  No lifting greater than 10 pounds. Limited bending, twisting, or overhead reaching.  Walking is the best way to start exercise after surgery. Take short frequent walks. You may gradually increase the distance as tolerated. If you feel pain, decrease your activity, but DO NOT stop walking. Walking will help you gain strength, prevent muscle soreness and spasms, and prevent blood clots.   Avoid bed rest and prolonged sitting for longer than 30 minutes (change positions frequently while awake)  No contact sports or high impact activities such as; running/jogging, snowmobile or 4 patel riding or any other recreational vehicles until after given clearance at one of your follow up visits    Medications   Refills of pain medication:   Please call the neurosurgery clinic to request 2-3 days before you run out. A nurse will call you back to obtain a pain assessment.   Weaning from narcotic pain medications  When it is time, start weaning by extending the time between doses.   For example, if you're taking 2 tabs every 4 hours, spread it out to 2 tabs over 4.5, 5, 6 hours. At that point you can certainly cut down to 1 tab, then wean to an as needed basis until completely done with them.  Don't take more than 3000mg of Acetaminophen in 24 hours  Ok to begin taking Aspirin and NSAIDs (ex: ibuprofen/Advil)  Encouraged icing for at least 3-4 times throughout the day for 20-30 minutes at a time. Avoid heat to the incision area.   Taking stool softeners regularly can reduce constipation commonly caused by narcotic pain medications.    Contact clinic or Emergency Room if you develop:   Infection (redness, swelling,  warmth, drainage, fever over 101 F)  New injury  Bladder or bowel changes or loss of control    Signs of blood clot:  Swelling and/or warmth in one or both legs  Pain or tenderness in your leg, ankle, foot, or arm   Red or discolored skin     Go to the Emergency Room   If sudden onset of severe headache, weakness, confusion, change in level of consciousness, pain, or loss of movement.  Chest pain  Trouble breathing     Post-operative appointments  6 week and 3 months post-op    [facility]  [place] [address] [place] [id number]  Telephone:  [phone]   Fax:  [phone]

## 2024-02-01 ENCOUNTER — OFFICE VISIT (OUTPATIENT)
Dept: NEUROSURGERY | Facility: CLINIC | Age: 64
End: 2024-02-01
Payer: COMMERCIAL

## 2024-02-01 VITALS
SYSTOLIC BLOOD PRESSURE: 143 MMHG | HEART RATE: 93 BPM | WEIGHT: 158 LBS | BODY MASS INDEX: 22.62 KG/M2 | HEIGHT: 70 IN | DIASTOLIC BLOOD PRESSURE: 84 MMHG

## 2024-02-01 DIAGNOSIS — Z98.890 S/P LUMBAR MICRODISCECTOMY: Primary | ICD-10-CM

## 2024-02-01 PROCEDURE — 99024 POSTOP FOLLOW-UP VISIT: CPT | Performed by: PHYSICIAN ASSISTANT

## 2024-02-01 ASSESSMENT — PAIN SCALES - GENERAL: PAINLEVEL: NO PAIN (1)

## 2024-02-01 NOTE — PROGRESS NOTES
Neurosurgery 6-week follow-up    Mr. Reed is a 63-year-old male who is 6 weeks status post right L4-5 minimally invasive laminotomy and discectomy performed by Dr. Huang.    Patient states his right leg pain has resolved, and his right leg strength has returned.  He is very pleased with his outcome denies any issues with incision.    Exam     Alert and oriented no acute distress  Bilateral lower extremity 5 5 strength including dorsiflexion of the right foot, able to stand and walk on his heels bilaterally.    Assessment    Status post right L4-5 minimally invasive laminotomy and discectomy      Plan    Gradually increase activity as tolerated.   Follow up as schedule

## 2024-02-01 NOTE — LETTER
2/1/2024         RE: Alexey Reed  [address]  [facility] [address]        Dear Colleague,    Thank you for referring your patient, Alexey Reed, to the [facility]UROS[facility]IC [place]. Please see a copy of my visit note below.    Neurosurgery 6-week follow-up    Mr. Reed is a 63-year-old male who is 6 weeks status post right L4-5 minimally invasive laminotomy and discectomy performed by Dr. Huang.    Patient states his right leg pain has resolved, and his right leg strength has returned.  He is very pleased with his outcome denies any issues with incision.    Exam     Alert and oriented no acute distress  Bilateral lower extremity 5 5 strength including dorsiflexion of the right foot, able to stand and walk on his heels bilaterally.    Assessment    Status post right L4-5 minimally invasive laminotomy and discectomy      Plan    Gradually increase activity as tolerated.   Follow up as schedule      Again, thank you for allowing me to participate in the care of your patient.        Sincerely,        Stephanie Shepard PA-C

## 2024-03-04 ENCOUNTER — OFFICE VISIT (OUTPATIENT)
Dept: URGENT CARE | Facility: URGENT CARE | Age: 64
End: 2024-03-04
Payer: COMMERCIAL

## 2024-03-04 ENCOUNTER — ANCILLARY PROCEDURE (OUTPATIENT)
Dept: GENERAL RADIOLOGY | Facility: CLINIC | Age: 64
End: 2024-03-04
Payer: COMMERCIAL

## 2024-03-04 VITALS
DIASTOLIC BLOOD PRESSURE: 87 MMHG | TEMPERATURE: 97.3 F | WEIGHT: 154 LBS | SYSTOLIC BLOOD PRESSURE: 144 MMHG | OXYGEN SATURATION: 92 % | BODY MASS INDEX: 22.1 KG/M2 | HEART RATE: 83 BPM | RESPIRATION RATE: 20 BRPM

## 2024-03-04 DIAGNOSIS — R05.1 ACUTE COUGH: ICD-10-CM

## 2024-03-04 DIAGNOSIS — J18.9 PNEUMONIA OF BOTH LOWER LOBES DUE TO INFECTIOUS ORGANISM: Primary | ICD-10-CM

## 2024-03-04 DIAGNOSIS — J44.1 COPD EXACERBATION (H): ICD-10-CM

## 2024-03-04 PROCEDURE — 71046 X-RAY EXAM CHEST 2 VIEWS: CPT | Mod: TC | Performed by: RADIOLOGY

## 2024-03-04 PROCEDURE — 94640 AIRWAY INHALATION TREATMENT: CPT

## 2024-03-04 PROCEDURE — 99214 OFFICE O/P EST MOD 30 MIN: CPT | Mod: 25

## 2024-03-04 RX ORDER — AZITHROMYCIN 250 MG/1
TABLET, FILM COATED ORAL
Qty: 6 TABLET | Refills: 0 | Status: SHIPPED | OUTPATIENT
Start: 2024-03-04 | End: 2024-03-09

## 2024-03-04 RX ORDER — AMOXICILLIN 500 MG/1
1000 CAPSULE ORAL 3 TIMES DAILY
Qty: 30 CAPSULE | Refills: 0 | Status: SHIPPED | OUTPATIENT
Start: 2024-03-04 | End: 2024-03-09

## 2024-03-04 RX ORDER — IPRATROPIUM BROMIDE AND ALBUTEROL SULFATE 2.5; .5 MG/3ML; MG/3ML
3 SOLUTION RESPIRATORY (INHALATION) ONCE
Status: COMPLETED | OUTPATIENT
Start: 2024-03-04 | End: 2024-03-04

## 2024-03-04 RX ADMIN — IPRATROPIUM BROMIDE AND ALBUTEROL SULFATE 3 ML: 2.5; .5 SOLUTION RESPIRATORY (INHALATION) at 10:52

## 2024-03-04 NOTE — PROGRESS NOTES
ASSESSMENT:  (J18.9) Pneumonia of both lower lobes due to infectious organism  (primary encounter diagnosis)  Plan: amoxicillin (AMOXIL) 500 MG capsule,         azithromycin (ZITHROMAX) 250 MG tablet    (R05.1) Acute cough  Plan: XR Chest 2 Views    (J44.1) COPD exacerbation (H)  Plan: ipratropium - albuterol 0.5 mg/2.5 mg/3 mL         (DUONEB) neb solution 3 mL    PLAN:  Reassessment of the patient after DuoNeb administration; oxygen saturation 92%, improved airflow through lungs as evidenced by clear auscultation of lung sounds versus decreased diminished and patient reported improvement of work of breathing.    Informed the patient that the chest x-ray shows the following per the radiologist report:   Stable left posterior and lateral basilar lentiform pleural thickening and blunting of bilateral costophrenic sulci, either due to chronic small pleural effusions or pleural thickening. Consider follow-up chest CT.   Slight increase in bilateral linear and interstitial opacities in the lung bases which may be due to mild superimposed pneumonia.    Pneumonia patient instructions discussed and provided.  Informed the patient to take the antibiotics as prescribed and finish the full course even if symptoms improve.  We discussed trying yogurt with active cultures or probiotic such as Culturelle daily to help prevent diarrhea while taking the antibiotics.  Instructed the patient to follow-up with his primary care provider in 7 to 10 days for recheck.  We discussed going to the emergency department if oxygen saturation is below 90% and/or if he has any new or worsening symptoms.  Patient acknowledged his understanding of the above plan.    The use of [email or web address] dictation services may have been used to construct the content in this note; any grammatical or spelling errors are non-intentional. Please contact the author of this note directly if you are in need of any clarification.      DOUGLAS Purcell  CNP      SUBJECTIVE:  Alexey Reed is a 63 year old male who presents to the clinic today with a chief complaint of cough  for 1.5 day(s).  His cough is described as productive clear.   The patient's symptoms are severe and worsening.  Associated symptoms include fever, shortness of breath, and wheezing. The patient's symptoms are exacerbated by lying down  Patient has been using albuterol and Spirva to improve symptoms.  Patient has a history of COPD.  He indicates his O2 sats at home are typically 92-95% which he states he monitors periodically.  He does not have home oxygen.      ROS  Negative except noted above.     OBJECTIVE:  BP (!) 144/87 (BP Location: Left arm, Patient Position: Sitting, Cuff Size: Adult Regular)   Pulse 83   Temp 97.3  F (36.3  C) (Tympanic)   Resp 20   Wt 69.9 kg (154 lb)   SpO2 (!) 88%   BMI 22.10 kg/m    GENERAL APPEARANCE: healthy, alert and no distress  EYES: EOMI,  PERRL, conjunctiva clear  HENT: nose and mouth without erythema, ulcers or lesions and oral mucous membranes moist, no erythema noted  NECK: supple, nontender, no lymphadenopathy  RESP: decreased breath sounds bilaterally  CV: regular rates and rhythm, normal S1 S2, no murmur noted  SKIN: no suspicious lesions or rashes    X-RAY: Chest x-ray shows the following per the radiologist report:   Stable left posterior and lateral basilar lentiform pleural thickening and blunting of bilateral costophrenic sulci, either due to chronic small pleural effusions or pleural thickening. Consider follow-up chest CT.   Slight increase in bilateral linear and interstitial opacities in the lung bases which may be due to mild superimposed pneumonia.

## 2024-03-04 NOTE — NURSING NOTE
Clinic Administered Medication Documentation    Patient was given IPRATROPIUM BROMIDE. Prior to medication administration, verified patient's identity using patient's name and date of birth.    Alicja Erickson MA

## 2024-03-04 NOTE — PATIENT INSTRUCTIONS
Chest x-ray shows the following per the radiologist report:   Stable left posterior and lateral basilar lentiform pleural thickening and blunting of bilateral costophrenic sulci, either due to chronic small pleural effusions or pleural thickening. Consider follow-up chest CT.   Slight increase in bilateral linear and interstitial opacities in the lung bases which may be due to mild superimposed pneumonia.    Take the antibiotics as prescribed and finish the full course even if symptoms improve.  Try yogurt with active cultures or probiotics such as Culturelle daily to help prevent diarrhea while using antibiotics.      Follow-up with your primary care provider in 7 to 10 days for recheck.  Go to the emergency department if oxygen saturation continues at below 90% and/or if you have any new or worsening symptoms.

## 2024-03-21 ENCOUNTER — OFFICE VISIT (OUTPATIENT)
Dept: NEUROSURGERY | Facility: CLINIC | Age: 64
End: 2024-03-21
Payer: COMMERCIAL

## 2024-03-21 VITALS
SYSTOLIC BLOOD PRESSURE: 127 MMHG | BODY MASS INDEX: 22.05 KG/M2 | HEIGHT: 70 IN | DIASTOLIC BLOOD PRESSURE: 75 MMHG | WEIGHT: 154 LBS | HEART RATE: 102 BPM

## 2024-03-21 DIAGNOSIS — Z98.890 S/P LUMBAR MICRODISCECTOMY: Primary | ICD-10-CM

## 2024-03-21 PROCEDURE — 99024 POSTOP FOLLOW-UP VISIT: CPT | Performed by: STUDENT IN AN ORGANIZED HEALTH CARE EDUCATION/TRAINING PROGRAM

## 2024-03-21 NOTE — LETTER
"    3/21/2024         RE: Alexey Reed  [address]  [place] [address]        Dear Colleague,    Thank you for referring your patient, Alexey Reed, to the [facility] [place]. Please see a copy of my visit note below.    HPI:  63 year old male s/p right L4-5 laminotomy and discectomy for lumbar radiculopathy with weakness.  He is doing very well since surgery without any significant pain or weakness.  He has not had any physical therapy but does not think it is tender to him at all.  Overall he is very happy with his results and doing well.  Current Outpatient Medications   Medication     albuterol (PROAIR HFA/PROVENTIL HFA/VENTOLIN HFA) 108 (90 Base) MCG/ACT inhaler     losartan (COZAAR) 100 MG tablet     MULTI-VITAMIN OR TABS     Pregabalin (LYRICA) 200 MG capsule     sertraline (ZOLOFT) 100 MG tablet     simvastatin (ZOCOR) 40 MG tablet     SPIRIVA HANDIHALER 18 MCG inhaled capsule     No current facility-administered medications for this visit.      Physical Exam:  Vital signs:      BP: 127/75 Pulse: 102           Height: 177.8 cm (5' 10\") Weight: 69.9 kg (154 lb)  Estimated body mass index is 22.1 kg/m  as calculated from the following:    Height as of this encounter: 1.778 m (5' 10\").    Weight as of this encounter: 69.9 kg (154 lb).  He has full strength in his bilateral lower extremities.  Sensation is intact light touch throughout.  Incision is well-healed.  Results Reviewed:  No new imaging to review today.  Assessment:  63-year-old male status post L4-5 laminotomy and discectomy for lumbar radiculopathy  Plan:  Okay to advance activity with activity as tolerated.  He can follow-up as necessary going forward.    Konrad Huang MD      Again, thank you for allowing me to participate in the care of your patient.        Sincerely,        Konrad Huang MD  "

## 2024-03-21 NOTE — PROGRESS NOTES
"HPI:  63 year old male s/p right L4-5 laminotomy and discectomy for lumbar radiculopathy with weakness.  He is doing very well since surgery without any significant pain or weakness.  He has not had any physical therapy but does not think it is tender to him at all.  Overall he is very happy with his results and doing well.  Current Outpatient Medications   Medication    albuterol (PROAIR HFA/PROVENTIL HFA/VENTOLIN HFA) 108 (90 Base) MCG/ACT inhaler    losartan (COZAAR) 100 MG tablet    MULTI-VITAMIN OR TABS    Pregabalin (LYRICA) 200 MG capsule    sertraline (ZOLOFT) 100 MG tablet    simvastatin (ZOCOR) 40 MG tablet    SPIRIVA HANDIHALER 18 MCG inhaled capsule     No current facility-administered medications for this visit.      Physical Exam:  Vital signs:      BP: 127/75 Pulse: 102           Height: 177.8 cm (5' 10\") Weight: 69.9 kg (154 lb)  Estimated body mass index is 22.1 kg/m  as calculated from the following:    Height as of this encounter: 1.778 m (5' 10\").    Weight as of this encounter: 69.9 kg (154 lb).  He has full strength in his bilateral lower extremities.  Sensation is intact light touch throughout.  Incision is well-healed.  Results Reviewed:  No new imaging to review today.  Assessment:  63-year-old male status post L4-5 laminotomy and discectomy for lumbar radiculopathy  Plan:  Okay to advance activity with activity as tolerated.  He can follow-up as necessary going forward.    Konrad Huang MD  "

## 2024-03-21 NOTE — NURSING NOTE
"Alexey Reed's goals for this visit include:   Chief Complaint   Patient presents with    RECHECK     Lumbar 12 week post op       He requests these members of his care team be copied on today's visit information: yes    PCP: David Morse    Referring Provider:  David Morse MD  [address] [place] [address]    /75   Pulse 102   Ht 1.778 m (5' 10\")   Wt 69.9 kg (154 lb)   BMI 22.10 kg/m      Do you need any medication refills at today's visit? No  LORIE Kennedy, CMA ([facility])      "

## 2024-03-22 ENCOUNTER — OFFICE VISIT (OUTPATIENT)
Dept: FAMILY MEDICINE | Facility: CLINIC | Age: 64
End: 2024-03-22
Payer: COMMERCIAL

## 2024-03-22 VITALS
OXYGEN SATURATION: 94 % | HEIGHT: 70 IN | TEMPERATURE: 97.9 F | WEIGHT: 157.13 LBS | SYSTOLIC BLOOD PRESSURE: 132 MMHG | BODY MASS INDEX: 22.49 KG/M2 | DIASTOLIC BLOOD PRESSURE: 85 MMHG | HEART RATE: 86 BPM | RESPIRATION RATE: 16 BRPM

## 2024-03-22 DIAGNOSIS — I10 HYPERTENSION GOAL BP (BLOOD PRESSURE) < 140/90: ICD-10-CM

## 2024-03-22 DIAGNOSIS — J43.9 PULMONARY EMPHYSEMA, UNSPECIFIED EMPHYSEMA TYPE (H): ICD-10-CM

## 2024-03-22 DIAGNOSIS — Z87.01 HISTORY OF PNEUMONIA: Primary | ICD-10-CM

## 2024-03-22 DIAGNOSIS — R91.1 LUNG NODULE: ICD-10-CM

## 2024-03-22 DIAGNOSIS — J44.9 CHRONIC OBSTRUCTIVE PULMONARY DISEASE, UNSPECIFIED COPD TYPE (H): ICD-10-CM

## 2024-03-22 DIAGNOSIS — M54.41 ACUTE RIGHT-SIDED LOW BACK PAIN WITH RIGHT-SIDED SCIATICA: ICD-10-CM

## 2024-03-22 PROCEDURE — 99214 OFFICE O/P EST MOD 30 MIN: CPT | Performed by: FAMILY MEDICINE

## 2024-03-22 RX ORDER — PREGABALIN 200 MG/1
200 CAPSULE ORAL 3 TIMES DAILY
Qty: 270 CAPSULE | Refills: 1 | Status: SHIPPED | OUTPATIENT
Start: 2024-03-22 | End: 2024-08-30

## 2024-03-22 ASSESSMENT — PAIN SCALES - GENERAL: PAINLEVEL: NO PAIN (0)

## 2024-03-22 NOTE — PROGRESS NOTES
"      Mel Natarajan is a 63 year old, presenting for the following health issues:  Hospital F/U        3/22/2024     7:03 AM   Additional Questions   Roomed by Leilani Camargo     [facility]         Hospital Follow-up Visit:    Hospital/Nursing Home/IP Rehab Facility:  [facility]  Date of Admission: 03/10/2024  Date of Discharge: 03/12/2024  Reason(s) for Admission: bacterial pneumonia    Was your hospitalization related to COVID-19? No   Problems taking medications regularly:  None  Medication changes since discharge: None  Problems adhering to non-medication therapy:  None    Summary of hospitalization:  CareEverywhere information obtained and reviewed  Diagnostic Tests/Treatments reviewed.  Follow up needed: none  Other Healthcare Providers Involved in Patient s Care:         None  Update since discharge: improved.         Plan of care communicated with patient           Only needs the oxygen if compresses oxygen    Has oximeter    Walk on treadmill and level mid 90s like 93, max 96    Planning to return to drumming this weekend    Quit smoking April 2023    No chest pain    Done with antibiotics     Done with steroid      Treadmill and drumming    Work in cubicle environment    Now working remote              Objective    /85 (BP Location: Right arm, Patient Position: Chair, Cuff Size: Adult Regular)   Pulse 86   Temp 97.9  F (36.6  C) (Temporal)   Resp 16   Ht 1.778 m (5' 10\")   Wt 71.3 kg (157 lb 2 oz)   SpO2 94%   BMI 22.55 kg/m    Body mass index is 22.55 kg/m .  Physical Exam  Constitutional:       Appearance: He is well-developed.   HENT:      Head: Normocephalic and atraumatic.   Eyes:      Conjunctiva/sclera: Conjunctivae normal.   Neck:      Vascular: No carotid bruit.   Cardiovascular:      Rate and Rhythm: Normal rate and regular rhythm.      Heart sounds: Normal heart sounds.   Pulmonary:      Effort: Pulmonary effort is normal. No respiratory distress.      Breath sounds: Normal breath " sounds.   Neurological:      Mental Status: He is alert and oriented to person, place, and time.      Cranial Nerves: No cranial nerve deficit.   Psychiatric:         Speech: Speech normal.         Behavior: Behavior normal.      No edema     Radials symmetric       ASSESSMENT / PLAN:  (Z87.01) History of pneumonia  (primary encounter diagnosis)  Comment: patient done with antibiotics.  He does have the oxygen available but not needing much.   Plan: encouraged more walking/ exericise.  Quit smoking a while ago.     (M54.41) Acute right-sided low back pain with right-sided sciatica  Comment: needs refill   Plan: Pregabalin (LYRICA) 200 MG capsule             (J44.9) Chronic obstructive pulmonary disease, unspecified COPD type (H)  Comment: as above, quit smoking.    Plan: encouraged exercise. Has the inhalers.     (J43.9) Pulmonary emphysema, unspecified emphysema type (H)  Comment: discussed this finding on CT  Plan: as above     (I10) Hypertension goal BP (blood pressure) < 140/90  Comment: at goal   Plan: no change    Lung nodule: persistent 6 mm nodule, seen on CT recently but no change from past.    Continue annual CT.      I reviewed the patient's medications, allergies, medical history, family history, and social history.    David Morse MD              Signed Electronically by: David Morse MD

## 2024-08-30 ENCOUNTER — OFFICE VISIT (OUTPATIENT)
Dept: FAMILY MEDICINE | Facility: CLINIC | Age: 64
End: 2024-08-30
Payer: COMMERCIAL

## 2024-08-30 VITALS
OXYGEN SATURATION: 94 % | HEART RATE: 99 BPM | SYSTOLIC BLOOD PRESSURE: 127 MMHG | WEIGHT: 157.13 LBS | TEMPERATURE: 98.6 F | RESPIRATION RATE: 18 BRPM | DIASTOLIC BLOOD PRESSURE: 81 MMHG | HEIGHT: 70 IN | BODY MASS INDEX: 22.49 KG/M2

## 2024-08-30 DIAGNOSIS — J44.9 CHRONIC OBSTRUCTIVE PULMONARY DISEASE, UNSPECIFIED COPD TYPE (H): ICD-10-CM

## 2024-08-30 DIAGNOSIS — Z87.891 PERSONAL HISTORY OF TOBACCO USE, PRESENTING HAZARDS TO HEALTH: ICD-10-CM

## 2024-08-30 DIAGNOSIS — E78.5 HYPERLIPIDEMIA LDL GOAL <130: ICD-10-CM

## 2024-08-30 DIAGNOSIS — M54.41 ACUTE RIGHT-SIDED LOW BACK PAIN WITH RIGHT-SIDED SCIATICA: ICD-10-CM

## 2024-08-30 DIAGNOSIS — M79.2 NEUROPATHIC PAIN SYNDROME (NON-HERPETIC): ICD-10-CM

## 2024-08-30 DIAGNOSIS — I10 HYPERTENSION GOAL BP (BLOOD PRESSURE) < 140/90: ICD-10-CM

## 2024-08-30 DIAGNOSIS — J43.9 PULMONARY EMPHYSEMA, UNSPECIFIED EMPHYSEMA TYPE (H): Primary | ICD-10-CM

## 2024-08-30 DIAGNOSIS — R06.09 DYSPNEA ON EXERTION: ICD-10-CM

## 2024-08-30 PROCEDURE — G2211 COMPLEX E/M VISIT ADD ON: HCPCS | Performed by: FAMILY MEDICINE

## 2024-08-30 PROCEDURE — 99214 OFFICE O/P EST MOD 30 MIN: CPT | Performed by: FAMILY MEDICINE

## 2024-08-30 RX ORDER — IPRATROPIUM BROMIDE AND ALBUTEROL SULFATE 2.5; .5 MG/3ML; MG/3ML
1 SOLUTION RESPIRATORY (INHALATION) EVERY 4 HOURS PRN
COMMUNITY
Start: 2024-08-12 | End: 2024-08-30

## 2024-08-30 RX ORDER — PREGABALIN 200 MG/1
200 CAPSULE ORAL 3 TIMES DAILY
Qty: 270 CAPSULE | Refills: 3 | Status: SHIPPED | OUTPATIENT
Start: 2024-08-30

## 2024-08-30 RX ORDER — ALBUTEROL SULFATE 90 UG/1
2 AEROSOL, METERED RESPIRATORY (INHALATION) EVERY 4 HOURS PRN
Qty: 18 G | Refills: 11 | Status: SHIPPED | OUTPATIENT
Start: 2024-08-30

## 2024-08-30 RX ORDER — SIMVASTATIN 40 MG
40 TABLET ORAL AT BEDTIME
Qty: 90 TABLET | Refills: 3 | Status: SHIPPED | OUTPATIENT
Start: 2024-08-30

## 2024-08-30 RX ORDER — LOSARTAN POTASSIUM 100 MG/1
100 TABLET ORAL DAILY
Qty: 90 TABLET | Refills: 3 | Status: SHIPPED | OUTPATIENT
Start: 2024-08-30

## 2024-08-30 RX ORDER — IPRATROPIUM BROMIDE AND ALBUTEROL SULFATE 2.5; .5 MG/3ML; MG/3ML
1 SOLUTION RESPIRATORY (INHALATION) EVERY 4 HOURS PRN
Qty: 90 ML | Refills: 1 | Status: SHIPPED | OUTPATIENT
Start: 2024-08-30 | End: 2024-09-17

## 2024-08-30 RX ORDER — SERTRALINE HYDROCHLORIDE 100 MG/1
100 TABLET, FILM COATED ORAL DAILY
Qty: 90 TABLET | Refills: 3 | Status: SHIPPED | OUTPATIENT
Start: 2024-08-30

## 2024-08-30 ASSESSMENT — PAIN SCALES - GENERAL: PAINLEVEL: NO PAIN (0)

## 2024-08-30 NOTE — PROGRESS NOTES
"      Mel Natarajan is a 63 year old, presenting for the following health issues:  Hospital F/U        8/30/2024    11:21 AM   Additional Questions   Roomed by Leilani Camargo     [facility]         Hospital Follow-up Visit:    Hospital/Nursing Home/IP Rehab Facility:  [facility]  Date of Admission: 08/11/2024  Date of Discharge: 08/13/2024  Reason(s) for Admission: COPD flare up  Was the patient in the ICU or did the patient experience delirium during hospitalization?  No  Do you have any other stressors you would like to discuss with your provider? No    Problems taking medications regularly:  None  Medication changes since discharge: Whitney added  Problems adhering to non-medication therapy:  None    Summary of hospitalization:  CareEverywhere information obtained and reviewed  Diagnostic Tests/Treatments reviewed.  Follow up needed: none  Other Healthcare Providers Involved in Patient s Care:         None  Update since discharge: improved.         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  Physical Exam  Constitutional:       Appearance: He is well-developed.   HENT:      Head: Normocephalic and atraumatic.   Eyes:      Conjunctiva/sclera: Conjunctivae normal.   Neck:      Vascular: No carotid bruit.   Cardiovascular:      Rate and Rhythm: Normal rate and regular rhythm.      Heart sounds: Normal heart " sounds.   Pulmonary:      Effort: Pulmonary effort is normal. No respiratory distress.      Breath sounds: Normal breath sounds.   Neurological:      Mental Status: He is alert and oriented to person, place, and time.      Cranial Nerves: No cranial nerve deficit.   Psychiatric:         Speech: Speech normal.         Behavior: Behavior normal.         No edema    Radials symmetric          ASSESSMENT / PLAN:  (J43.9) Pulmonary emphysema, unspecified emphysema type (H)  (primary encounter diagnosis)  Comment: needs refill but patient can slowly taper down usage of this ( he takes sprirva daily )  Plan: ipratropium - albuterol 0.5 mg/2.5 mg/3 mL         (DUONEB) 0.5-2.5 (3) MG/3ML neb solution             (J44.9) Chronic obstructive pulmonary disease, unspecified COPD type (H)  Comment: needs refill, uses prn.   Plan: albuterol (PROAIR HFA/PROVENTIL HFA/VENTOLIN         HFA) 108 (90 Base) MCG/ACT inhaler             (M54.41) Acute right-sided low back pain with right-sided sciatica  Comment: needs refill   Plan: Pregabalin (LYRICA) 200 MG capsule             (E78.5) Hyperlipidemia LDL goal <130  Comment: refill   Plan: simvastatin (ZOCOR) 40 MG tablet             (M79.2) Neuropathic pain syndrome (non-herpetic)  Comment: refill   Plan: sertraline (ZOLOFT) 100 MG tablet             (I10) Hypertension goal BP (blood pressure) < 140/90  Comment: blood pressure okay   Plan: losartan (COZAAR) 100 MG tablet        Refill     (R06.09) Dyspnea on exertion  Comment: prudent to make sure heart okay   Plan: Echocardiogram Exercise Stress        Patient to schedule     (Z87.987) Personal history of tobacco use, presenting hazards to health  Comment: patient now done with smoking  Plan: stay off smoking entirely     Follow up with pulmonary as planned       I reviewed the patient's medications, allergies, medical history, family history, and social history.    David Morse MD          Signed Electronically by: David Morse,  MD

## 2024-08-30 NOTE — PATIENT INSTRUCTIONS
Schedule heart stress test     Consider gradually decreasing use of the duoneb nebulizer    Continue other meds as is    See pulmonary specialist

## 2024-09-17 DIAGNOSIS — J43.9 PULMONARY EMPHYSEMA, UNSPECIFIED EMPHYSEMA TYPE (H): ICD-10-CM

## 2024-09-17 RX ORDER — IPRATROPIUM BROMIDE AND ALBUTEROL SULFATE 2.5; .5 MG/3ML; MG/3ML
1 SOLUTION RESPIRATORY (INHALATION) EVERY 4 HOURS PRN
Qty: 90 ML | Refills: 0 | Status: SHIPPED | OUTPATIENT
Start: 2024-09-17 | End: 2024-09-23

## 2024-09-23 ENCOUNTER — OFFICE VISIT (OUTPATIENT)
Dept: URGENT CARE | Facility: URGENT CARE | Age: 64
End: 2024-09-23
Payer: COMMERCIAL

## 2024-09-23 ENCOUNTER — ANCILLARY PROCEDURE (OUTPATIENT)
Dept: GENERAL RADIOLOGY | Facility: CLINIC | Age: 64
End: 2024-09-23
Attending: PHYSICIAN ASSISTANT
Payer: COMMERCIAL

## 2024-09-23 VITALS
WEIGHT: 149.6 LBS | OXYGEN SATURATION: 90 % | HEART RATE: 102 BPM | SYSTOLIC BLOOD PRESSURE: 144 MMHG | TEMPERATURE: 98.4 F | DIASTOLIC BLOOD PRESSURE: 88 MMHG | BODY MASS INDEX: 21.47 KG/M2

## 2024-09-23 DIAGNOSIS — J44.1 COPD EXACERBATION (H): ICD-10-CM

## 2024-09-23 DIAGNOSIS — J43.9 PULMONARY EMPHYSEMA, UNSPECIFIED EMPHYSEMA TYPE (H): ICD-10-CM

## 2024-09-23 DIAGNOSIS — J44.1 CHRONIC OBSTRUCTIVE PULMONARY DISEASE WITH ACUTE EXACERBATION (H): Primary | ICD-10-CM

## 2024-09-23 PROCEDURE — 99214 OFFICE O/P EST MOD 30 MIN: CPT | Performed by: PHYSICIAN ASSISTANT

## 2024-09-23 PROCEDURE — 71046 X-RAY EXAM CHEST 2 VIEWS: CPT | Mod: TC | Performed by: RADIOLOGY

## 2024-09-23 RX ORDER — IPRATROPIUM BROMIDE AND ALBUTEROL SULFATE 2.5; .5 MG/3ML; MG/3ML
1 SOLUTION RESPIRATORY (INHALATION) EVERY 4 HOURS PRN
Qty: 90 ML | Refills: 2 | Status: SHIPPED | OUTPATIENT
Start: 2024-09-23

## 2024-09-23 RX ORDER — PREDNISONE 20 MG/1
TABLET ORAL
Qty: 15 TABLET | Refills: 0 | Status: SHIPPED | OUTPATIENT
Start: 2024-09-23 | End: 2024-10-03

## 2024-09-23 NOTE — PROGRESS NOTES
"Assessment & Plan     Chronic obstructive pulmonary disease with acute exacerbation (H)  - XR Chest 2 Views; Future  - ipratropium-albuterol (COMBIVENT RESPIMAT)  MCG/ACT inhaler; Inhale 1 puff into the lungs 4 times daily.  - predniSONE (DELTASONE) 20 MG tablet; Take 2 tablets (40 mg) by mouth daily for 5 days, THEN 1 tablet (20 mg) daily for 5 days.  - amoxicillin-clavulanate (AUGMENTIN) 875-125 MG tablet; Take 1 tablet by mouth 2 times daily for 7 days.    Pulmonary emphysema, unspecified emphysema type (H)  - ipratropium - albuterol 0.5 mg/2.5 mg/3 mL (DUONEB) 0.5-2.5 (3) MG/3ML neb solution; Take 1 vial (3 mLs) by nebulization every 4 hours as needed for shortness of breath or wheezing.    CXR with bilateral blunting of costophrenic sulci, no acute infiltrate or consolidation consistent with pneumonia. Awaiting read by radiology. Schedule to see pulmonology next month for COPD management. Had intermittent afib on telemetry in August, opted to avoid azithromycin. Augmentin and prednisone, refilled duo neb and added combivent as needed. Monitor O2 and symptoms, go to the emergency room if symptoms worsening or O2 below 88.    Return in about 1 week (around 9/30/2024) for visit with primary care provider if not improving.     [facility]NDON Portillo [facility]    Subjective   Alexey Reed is a 63 year old who presents for the following health issues     Patient presents with:  Urgent Care  URI: Sx for about a week, \"wasn't get any better so came in\", \"cough, sore throat, congestion, raspy, SOB\", have COPD       HPI    Delgado presents clinic today for evaluation of a COPD exacerbation.  Symptoms first began little over a week ago.  Symptoms began with a sore throat that he developed cough, shortness of breath, congestion.  He had a low-grade fever, under 100F, last week that has since resolved.  His cough is productive of watery mucus. O2 at home has been mid 90s at rest and low 90s " with exertion.  He was hospitalized a little over a month ago with a COPD exacerbation.  He has been using his Spiriva inhaler and DuoNeb which has been helpful. He's scheduled to see a pulmonologist next month.     Review of Systems   ROS negative except as stated above.      Objective    BP (!) 144/88 (BP Location: Left arm, Patient Position: Sitting, Cuff Size: Adult Regular)   Pulse 102   Temp 98.4  F (36.9  C) (Tympanic)   Wt 67.9 kg (149 lb 9.6 oz)   SpO2 90%   BMI 21.47 kg/m    Physical Exam   GENERAL: alert and no distress  EYES: Eyes grossly normal to inspection, PERRL and conjunctivae and sclerae normal  HENT: ear canals and TM's normal, nose and mouth without ulcers or lesions  NECK: no adenopathy, no asymmetry, masses, or scars  RESP: lungs with crackles in bilateral bases to auscultation, some expiratory wheezing throughout lung field. No significant rhonchi. Slightly increased respiratory effort.  CV: regular rate and rhythm, normal S1 S2, no S3 or S4, no murmur, click or rub, no peripheral edema  NEURO: Normal strength and tone, mentation intact and speech normal    No results found for any visits on 09/23/24.

## 2024-11-03 ENCOUNTER — HEALTH MAINTENANCE LETTER (OUTPATIENT)
Age: 64
End: 2024-11-03

## 2024-12-20 DIAGNOSIS — J44.1 CHRONIC OBSTRUCTIVE PULMONARY DISEASE WITH ACUTE EXACERBATION (H): ICD-10-CM

## 2024-12-20 NOTE — TELEPHONE ENCOUNTER
Clinic RN: Please investigate patient's chart or contact patient if the information cannot be found because  please clarify pharmacy request for SIG change.

## 2024-12-24 NOTE — TELEPHONE ENCOUNTER
Request from Nadia Saez. Patient has this med initiated in UC back in Sep. Refill needed.     Thanks,  BILL Greenwood  [facility]

## (undated) DEVICE — PACK SMALL SPINE RIDGES

## (undated) DEVICE — DRAPE X-RAY TUBE 00-901169-01-OEC

## (undated) DEVICE — SOL WATER IRRIG 1000ML BOTTLE 07139-09

## (undated) DEVICE — SYR 30ML LL W/O NDL 302832

## (undated) DEVICE — GLOVE BIOGEL PI MICRO SZ 6.5 48565

## (undated) DEVICE — GOWN XXL 9575

## (undated) DEVICE — SU VICRYL 0 CT-2 CR 8X18" J727D

## (undated) DEVICE — DRAPE STERI TOWEL LG 1010

## (undated) DEVICE — GLOVE BIOGEL PI MICRO SZ 7.0 48570

## (undated) DEVICE — SOL NACL 0.9% IRRIG 1000ML BOTTLE 2F7124

## (undated) DEVICE — SPONGE COTTONOID 1/2X1/2" 80-1400

## (undated) DEVICE — PREP CHLORAPREP 26ML TINTED ORANGE  260815

## (undated) DEVICE — PREP CHLORAPREP 26ML TINTED HI-LITE ORANGE 930815

## (undated) DEVICE — PAD FOAM WILSON FRAME/JACKSON TABLE PT KIT 5878

## (undated) DEVICE — GLOVE BIOGEL PI MICRO INDICATOR UNDERGLOVE SZ 6.5 48965

## (undated) DEVICE — DRAPE MAYO STAND 23X54 8337

## (undated) DEVICE — PAD CHUX UNDERPAD 23X24" 7136

## (undated) DEVICE — CUSHION INSERT LG PRONE VIEW JACKSON TABLE

## (undated) DEVICE — GLOVE BIOGEL PI MICRO INDICATOR UNDERGLOVE SZ 8.0 48980

## (undated) DEVICE — SU MONOCRYL 3-0 PS-2 27" Y427H

## (undated) DEVICE — SUCTION MANIFOLD NEPTUNE 2 SYS 4 PORT 0702-020-000

## (undated) DEVICE — ESU ELEC BLADE 2.75" COATED/INSULATED E1455

## (undated) DEVICE — SU DERMABOND ADVANCED .7ML DNX12

## (undated) DEVICE — GLOVE BIOGEL PI MICRO INDICATOR UNDERGLOVE SZ 7.5 48975

## (undated) DEVICE — GLOVE BIOGEL PI MICRO SZ 8.0 48580

## (undated) DEVICE — SU VICRYL 2-0 CT-2 CR 8X18" J726D

## (undated) DEVICE — DRAPE MICROSCOPE LEICA 54X120" 09-MK653

## (undated) DEVICE — KIT ENDO FIRST STEP DISINFECTANT 200ML W/POUCH EP-4

## (undated) DEVICE — TOOL DISSECT MIDAS MR8 14CM TELESC BALL DMD MR8-T14BA40D

## (undated) DEVICE — ESU ELEC BLADE 6" COATED/INSULATED E1455-6

## (undated) DEVICE — ESU GROUND PAD ADULT W/CORD E7507

## (undated) DEVICE — LINEN ORTHO ACL PACK 5447

## (undated) DEVICE — RX SURGIFLO HEMOSTATIC MATRIX 8ML 2991

## (undated) RX ORDER — METHADONE HYDROCHLORIDE 10 MG/ML
INJECTION, SOLUTION INTRAMUSCULAR; INTRAVENOUS; SUBCUTANEOUS
Status: DISPENSED
Start: 2023-12-18

## (undated) RX ORDER — EPHEDRINE SULFATE 50 MG/ML
INJECTION, SOLUTION INTRAMUSCULAR; INTRAVENOUS; SUBCUTANEOUS
Status: DISPENSED
Start: 2023-12-18

## (undated) RX ORDER — FENTANYL CITRATE 50 UG/ML
INJECTION, SOLUTION INTRAMUSCULAR; INTRAVENOUS
Status: DISPENSED
Start: 2022-04-29

## (undated) RX ORDER — DEXMEDETOMIDINE HYDROCHLORIDE 4 UG/ML
INJECTION, SOLUTION INTRAVENOUS
Status: DISPENSED
Start: 2023-12-18

## (undated) RX ORDER — FENTANYL CITRATE 50 UG/ML
INJECTION, SOLUTION INTRAMUSCULAR; INTRAVENOUS
Status: DISPENSED
Start: 2019-06-28

## (undated) RX ORDER — CEFAZOLIN SODIUM/WATER 2 G/20 ML
SYRINGE (ML) INTRAVENOUS
Status: DISPENSED
Start: 2023-12-18

## (undated) RX ORDER — LIDOCAINE HYDROCHLORIDE 10 MG/ML
INJECTION, SOLUTION EPIDURAL; INFILTRATION; INTRACAUDAL; PERINEURAL
Status: DISPENSED
Start: 2020-11-11

## (undated) RX ORDER — FENTANYL CITRATE-0.9 % NACL/PF 10 MCG/ML
PLASTIC BAG, INJECTION (ML) INTRAVENOUS
Status: DISPENSED
Start: 2023-12-18